# Patient Record
Sex: MALE | Race: WHITE | NOT HISPANIC OR LATINO | Employment: OTHER | URBAN - METROPOLITAN AREA
[De-identification: names, ages, dates, MRNs, and addresses within clinical notes are randomized per-mention and may not be internally consistent; named-entity substitution may affect disease eponyms.]

---

## 2017-02-01 ENCOUNTER — OFFICE VISIT - HEALTHEAST (OUTPATIENT)
Dept: FAMILY MEDICINE | Facility: CLINIC | Age: 62
End: 2017-02-01

## 2017-02-01 DIAGNOSIS — I10 ESSENTIAL HYPERTENSION WITH GOAL BLOOD PRESSURE LESS THAN 130/80: ICD-10-CM

## 2017-02-01 DIAGNOSIS — R05.9 COUGH: ICD-10-CM

## 2017-02-01 DIAGNOSIS — J01.10 ACUTE NON-RECURRENT FRONTAL SINUSITIS: ICD-10-CM

## 2017-03-14 ENCOUNTER — OFFICE VISIT - HEALTHEAST (OUTPATIENT)
Dept: FAMILY MEDICINE | Facility: CLINIC | Age: 62
End: 2017-03-14

## 2017-03-14 DIAGNOSIS — I10 ESSENTIAL HYPERTENSION WITH GOAL BLOOD PRESSURE LESS THAN 130/80: ICD-10-CM

## 2017-03-14 DIAGNOSIS — L60.0 INGROWING NAIL, RIGHT GREAT TOE: ICD-10-CM

## 2017-03-14 DIAGNOSIS — R05.9 COUGH: ICD-10-CM

## 2017-03-14 DIAGNOSIS — B35.1 ONYCHOMYCOSIS: ICD-10-CM

## 2017-05-03 ENCOUNTER — COMMUNICATION - HEALTHEAST (OUTPATIENT)
Dept: FAMILY MEDICINE | Facility: CLINIC | Age: 62
End: 2017-05-03

## 2017-05-03 DIAGNOSIS — I10 ESSENTIAL HYPERTENSION: ICD-10-CM

## 2017-06-06 ENCOUNTER — OFFICE VISIT - HEALTHEAST (OUTPATIENT)
Dept: FAMILY MEDICINE | Facility: CLINIC | Age: 62
End: 2017-06-06

## 2017-06-06 DIAGNOSIS — I10 ESSENTIAL HYPERTENSION: ICD-10-CM

## 2017-06-06 DIAGNOSIS — Z00.00 PREVENTATIVE HEALTH CARE: ICD-10-CM

## 2017-06-06 DIAGNOSIS — K57.30 DIVERTICULOSIS OF LARGE INTESTINE WITHOUT HEMORRHAGE: ICD-10-CM

## 2017-06-06 DIAGNOSIS — E66.09 NON MORBID OBESITY DUE TO EXCESS CALORIES: ICD-10-CM

## 2017-06-06 LAB
CHOLEST SERPL-MCNC: 144 MG/DL
FASTING STATUS PATIENT QL REPORTED: NO
HBA1C MFR BLD: 5.7 % (ref 3.5–6)
HDLC SERPL-MCNC: 42 MG/DL

## 2018-05-23 ENCOUNTER — RECORDS - HEALTHEAST (OUTPATIENT)
Dept: GENERAL RADIOLOGY | Facility: CLINIC | Age: 63
End: 2018-05-23

## 2018-05-23 ENCOUNTER — OFFICE VISIT - HEALTHEAST (OUTPATIENT)
Dept: FAMILY MEDICINE | Facility: CLINIC | Age: 63
End: 2018-05-23

## 2018-05-23 DIAGNOSIS — M25.512 PAIN IN JOINT OF LEFT SHOULDER: ICD-10-CM

## 2018-05-23 DIAGNOSIS — M25.512 PAIN IN LEFT SHOULDER: ICD-10-CM

## 2018-05-23 DIAGNOSIS — I10 ESSENTIAL HYPERTENSION: ICD-10-CM

## 2018-05-23 ASSESSMENT — MIFFLIN-ST. JEOR: SCORE: 1747.41

## 2018-06-20 ENCOUNTER — COMMUNICATION - HEALTHEAST (OUTPATIENT)
Dept: FAMILY MEDICINE | Facility: CLINIC | Age: 63
End: 2018-06-20

## 2018-06-25 ENCOUNTER — OFFICE VISIT - HEALTHEAST (OUTPATIENT)
Dept: FAMILY MEDICINE | Facility: CLINIC | Age: 63
End: 2018-06-25

## 2018-06-25 DIAGNOSIS — J18.9 PNEUMONIA: ICD-10-CM

## 2018-06-25 ASSESSMENT — MIFFLIN-ST. JEOR: SCORE: 1740.15

## 2018-07-05 ENCOUNTER — OFFICE VISIT - HEALTHEAST (OUTPATIENT)
Dept: FAMILY MEDICINE | Facility: CLINIC | Age: 63
End: 2018-07-05

## 2018-07-05 DIAGNOSIS — J32.9 SINUSITIS: ICD-10-CM

## 2018-07-05 ASSESSMENT — MIFFLIN-ST. JEOR: SCORE: 1727.91

## 2019-05-17 ENCOUNTER — COMMUNICATION - HEALTHEAST (OUTPATIENT)
Dept: FAMILY MEDICINE | Facility: CLINIC | Age: 64
End: 2019-05-17

## 2019-05-17 DIAGNOSIS — I10 ESSENTIAL HYPERTENSION: ICD-10-CM

## 2019-07-25 ENCOUNTER — AMBULATORY - HEALTHEAST (OUTPATIENT)
Dept: FAMILY MEDICINE | Facility: CLINIC | Age: 64
End: 2019-07-25

## 2019-07-25 ENCOUNTER — AMBULATORY - HEALTHEAST (OUTPATIENT)
Dept: NURSING | Facility: CLINIC | Age: 64
End: 2019-07-25

## 2019-07-25 DIAGNOSIS — Z23 NEED FOR VACCINATION: ICD-10-CM

## 2019-08-08 ENCOUNTER — RECORDS - HEALTHEAST (OUTPATIENT)
Dept: ADMINISTRATIVE | Facility: OTHER | Age: 64
End: 2019-08-08

## 2019-08-16 ENCOUNTER — COMMUNICATION - HEALTHEAST (OUTPATIENT)
Dept: FAMILY MEDICINE | Facility: CLINIC | Age: 64
End: 2019-08-16

## 2019-08-16 DIAGNOSIS — I10 ESSENTIAL HYPERTENSION: ICD-10-CM

## 2019-08-20 ENCOUNTER — OFFICE VISIT - HEALTHEAST (OUTPATIENT)
Dept: FAMILY MEDICINE | Facility: CLINIC | Age: 64
End: 2019-08-20

## 2019-08-20 DIAGNOSIS — I11.9 HYPERTENSIVE HEART DISEASE WITHOUT HEART FAILURE: ICD-10-CM

## 2019-08-20 LAB
ANION GAP SERPL CALCULATED.3IONS-SCNC: 7 MMOL/L (ref 5–18)
BUN SERPL-MCNC: 21 MG/DL (ref 8–22)
CALCIUM SERPL-MCNC: 10.2 MG/DL (ref 8.5–10.5)
CHLORIDE BLD-SCNC: 107 MMOL/L (ref 98–107)
CO2 SERPL-SCNC: 25 MMOL/L (ref 22–31)
CREAT SERPL-MCNC: 0.97 MG/DL (ref 0.7–1.3)
GFR SERPL CREATININE-BSD FRML MDRD: >60 ML/MIN/1.73M2
GLUCOSE BLD-MCNC: 91 MG/DL (ref 70–125)
POTASSIUM BLD-SCNC: 4 MMOL/L (ref 3.5–5)
SODIUM SERPL-SCNC: 139 MMOL/L (ref 136–145)

## 2019-08-20 ASSESSMENT — MIFFLIN-ST. JEOR: SCORE: 1743.78

## 2019-11-05 ENCOUNTER — COMMUNICATION - HEALTHEAST (OUTPATIENT)
Dept: SCHEDULING | Facility: CLINIC | Age: 64
End: 2019-11-05

## 2019-11-05 DIAGNOSIS — I10 ESSENTIAL HYPERTENSION: ICD-10-CM

## 2020-02-13 ENCOUNTER — OFFICE VISIT - HEALTHEAST (OUTPATIENT)
Dept: FAMILY MEDICINE | Facility: CLINIC | Age: 65
End: 2020-02-13

## 2020-02-13 ENCOUNTER — COMMUNICATION - HEALTHEAST (OUTPATIENT)
Dept: SCHEDULING | Facility: CLINIC | Age: 65
End: 2020-02-13

## 2020-02-13 DIAGNOSIS — Z20.828 EXPOSURE TO INFLUENZA: ICD-10-CM

## 2020-02-13 DIAGNOSIS — R09.81 SINUS CONGESTION: ICD-10-CM

## 2020-02-13 DIAGNOSIS — R05.9 COUGH: ICD-10-CM

## 2020-02-13 LAB
FLUAV AG SPEC QL IA: NORMAL
FLUBV AG SPEC QL IA: NORMAL

## 2020-02-21 ENCOUNTER — COMMUNICATION - HEALTHEAST (OUTPATIENT)
Dept: SCHEDULING | Facility: CLINIC | Age: 65
End: 2020-02-21

## 2020-02-21 DIAGNOSIS — I10 ESSENTIAL HYPERTENSION: ICD-10-CM

## 2020-08-19 ENCOUNTER — COMMUNICATION - HEALTHEAST (OUTPATIENT)
Dept: SCHEDULING | Facility: CLINIC | Age: 65
End: 2020-08-19

## 2020-08-19 DIAGNOSIS — I10 ESSENTIAL HYPERTENSION: ICD-10-CM

## 2020-12-03 ENCOUNTER — COMMUNICATION - HEALTHEAST (OUTPATIENT)
Dept: FAMILY MEDICINE | Facility: CLINIC | Age: 65
End: 2020-12-03

## 2020-12-03 DIAGNOSIS — I10 ESSENTIAL HYPERTENSION: ICD-10-CM

## 2021-01-13 ENCOUNTER — OFFICE VISIT - HEALTHEAST (OUTPATIENT)
Dept: FAMILY MEDICINE | Facility: CLINIC | Age: 66
End: 2021-01-13

## 2021-01-13 DIAGNOSIS — Z00.01 ENCOUNTER FOR GENERAL ADULT MEDICAL EXAMINATION WITH ABNORMAL FINDINGS: ICD-10-CM

## 2021-01-13 DIAGNOSIS — E66.811 CLASS 1 OBESITY DUE TO EXCESS CALORIES WITH SERIOUS COMORBIDITY AND BODY MASS INDEX (BMI) OF 32.0 TO 32.9 IN ADULT: ICD-10-CM

## 2021-01-13 DIAGNOSIS — E66.09 CLASS 1 OBESITY DUE TO EXCESS CALORIES WITH SERIOUS COMORBIDITY AND BODY MASS INDEX (BMI) OF 32.0 TO 32.9 IN ADULT: ICD-10-CM

## 2021-01-13 DIAGNOSIS — M50.30 DEGENERATIVE DISC DISEASE, CERVICAL: ICD-10-CM

## 2021-01-13 DIAGNOSIS — I10 ESSENTIAL HYPERTENSION, BENIGN: ICD-10-CM

## 2021-01-13 DIAGNOSIS — K57.30 DIVERTICULOSIS OF LARGE INTESTINE WITHOUT HEMORRHAGE: ICD-10-CM

## 2021-01-13 DIAGNOSIS — R73.01 IMPAIRED FASTING GLUCOSE: ICD-10-CM

## 2021-01-13 DIAGNOSIS — Z23 ENCOUNTER FOR IMMUNIZATION: ICD-10-CM

## 2021-01-13 DIAGNOSIS — Z12.5 SCREENING FOR PROSTATE CANCER: ICD-10-CM

## 2021-01-13 DIAGNOSIS — D12.6 TUBULAR ADENOMA OF COLON: ICD-10-CM

## 2021-01-13 LAB
ANION GAP SERPL CALCULATED.3IONS-SCNC: 11 MMOL/L (ref 5–18)
BUN SERPL-MCNC: 18 MG/DL (ref 8–22)
CALCIUM SERPL-MCNC: 10.1 MG/DL (ref 8.5–10.5)
CHLORIDE BLD-SCNC: 105 MMOL/L (ref 98–107)
CHOLEST SERPL-MCNC: 159 MG/DL
CO2 SERPL-SCNC: 26 MMOL/L (ref 22–31)
CREAT SERPL-MCNC: 1.07 MG/DL (ref 0.7–1.3)
FASTING STATUS PATIENT QL REPORTED: NO
GFR SERPL CREATININE-BSD FRML MDRD: >60 ML/MIN/1.73M2
GLUCOSE BLD-MCNC: 87 MG/DL (ref 70–125)
HBA1C MFR BLD: 5.4 %
HDLC SERPL-MCNC: 43 MG/DL
LDLC SERPL CALC-MCNC: 98 MG/DL
POTASSIUM BLD-SCNC: 4.2 MMOL/L (ref 3.5–5)
PSA SERPL-MCNC: 0.7 NG/ML (ref 0–4.5)
SODIUM SERPL-SCNC: 142 MMOL/L (ref 136–145)
TRIGL SERPL-MCNC: 88 MG/DL

## 2021-01-13 RX ORDER — LISINOPRIL/HYDROCHLOROTHIAZIDE 10-12.5 MG
1 TABLET ORAL DAILY
Qty: 90 TABLET | Refills: 3 | Status: SHIPPED | OUTPATIENT
Start: 2021-01-13 | End: 2021-08-05

## 2021-01-13 ASSESSMENT — MIFFLIN-ST. JEOR: SCORE: 1762.72

## 2021-01-18 ENCOUNTER — COMMUNICATION - HEALTHEAST (OUTPATIENT)
Dept: PHYSICAL MEDICINE AND REHAB | Facility: CLINIC | Age: 66
End: 2021-01-18

## 2021-01-18 ENCOUNTER — COMMUNICATION - HEALTHEAST (OUTPATIENT)
Dept: LAB | Facility: CLINIC | Age: 66
End: 2021-01-18

## 2021-01-19 ENCOUNTER — HOSPITAL ENCOUNTER (OUTPATIENT)
Dept: PHYSICAL MEDICINE AND REHAB | Facility: CLINIC | Age: 66
Discharge: HOME | End: 2021-01-19
Attending: FAMILY MEDICINE

## 2021-01-19 DIAGNOSIS — R20.2 PARESTHESIA: ICD-10-CM

## 2021-01-19 DIAGNOSIS — M47.812 CERVICAL SPONDYLOSIS WITHOUT MYELOPATHY: ICD-10-CM

## 2021-01-19 DIAGNOSIS — M54.2 CERVICAL SPINE PAIN: ICD-10-CM

## 2021-01-19 ASSESSMENT — MIFFLIN-ST. JEOR: SCORE: 1762.72

## 2021-01-27 ENCOUNTER — OFFICE VISIT - HEALTHEAST (OUTPATIENT)
Dept: PHYSICAL THERAPY | Facility: REHABILITATION | Age: 66
End: 2021-01-27

## 2021-01-27 DIAGNOSIS — M62.81 GENERALIZED MUSCLE WEAKNESS: ICD-10-CM

## 2021-01-27 DIAGNOSIS — R29.3 POOR POSTURE: ICD-10-CM

## 2021-01-27 DIAGNOSIS — M54.2 CERVICALGIA: ICD-10-CM

## 2021-01-29 ENCOUNTER — HOSPITAL ENCOUNTER (OUTPATIENT)
Dept: MRI IMAGING | Facility: CLINIC | Age: 66
Discharge: HOME | End: 2021-01-29
Attending: PHYSICIAN ASSISTANT

## 2021-01-29 ENCOUNTER — COMMUNICATION - HEALTHEAST (OUTPATIENT)
Dept: PHYSICAL MEDICINE AND REHAB | Facility: CLINIC | Age: 66
End: 2021-01-29

## 2021-01-29 DIAGNOSIS — M48.02 SPINAL STENOSIS IN CERVICAL REGION: ICD-10-CM

## 2021-01-29 DIAGNOSIS — M54.2 CERVICAL SPINE PAIN: ICD-10-CM

## 2021-02-05 ENCOUNTER — AMBULATORY - HEALTHEAST (OUTPATIENT)
Dept: NEUROSURGERY | Facility: CLINIC | Age: 66
End: 2021-02-05

## 2021-02-05 ENCOUNTER — OFFICE VISIT - HEALTHEAST (OUTPATIENT)
Dept: PHYSICAL THERAPY | Facility: REHABILITATION | Age: 66
End: 2021-02-05

## 2021-02-05 DIAGNOSIS — M54.2 NECK PAIN: ICD-10-CM

## 2021-02-05 DIAGNOSIS — R29.3 POOR POSTURE: ICD-10-CM

## 2021-02-05 DIAGNOSIS — M54.2 CERVICALGIA: ICD-10-CM

## 2021-02-05 DIAGNOSIS — M62.81 GENERALIZED MUSCLE WEAKNESS: ICD-10-CM

## 2021-02-09 ENCOUNTER — HOSPITAL ENCOUNTER (OUTPATIENT)
Dept: RADIOLOGY | Facility: HOSPITAL | Age: 66
Discharge: HOME | End: 2021-02-09
Attending: NEUROLOGICAL SURGERY

## 2021-02-09 ENCOUNTER — OFFICE VISIT - HEALTHEAST (OUTPATIENT)
Dept: NEUROSURGERY | Facility: CLINIC | Age: 66
End: 2021-02-09

## 2021-02-09 DIAGNOSIS — M54.2 NECK PAIN: ICD-10-CM

## 2021-02-09 DIAGNOSIS — M43.12 SPONDYLOLISTHESIS OF CERVICAL REGION: ICD-10-CM

## 2021-02-09 DIAGNOSIS — M47.22 OSTEOARTHRITIS OF SPINE WITH RADICULOPATHY, CERVICAL REGION: ICD-10-CM

## 2021-02-09 DIAGNOSIS — M48.02 CERVICAL SPINAL STENOSIS: ICD-10-CM

## 2021-02-09 ASSESSMENT — MIFFLIN-ST. JEOR: SCORE: 1786.76

## 2021-02-12 ENCOUNTER — OFFICE VISIT - HEALTHEAST (OUTPATIENT)
Dept: PHYSICAL THERAPY | Facility: REHABILITATION | Age: 66
End: 2021-02-12

## 2021-02-12 DIAGNOSIS — M62.81 GENERALIZED MUSCLE WEAKNESS: ICD-10-CM

## 2021-02-12 DIAGNOSIS — R29.3 POOR POSTURE: ICD-10-CM

## 2021-02-12 DIAGNOSIS — M54.2 CERVICALGIA: ICD-10-CM

## 2021-02-23 ENCOUNTER — OFFICE VISIT - HEALTHEAST (OUTPATIENT)
Dept: PHYSICAL THERAPY | Facility: REHABILITATION | Age: 66
End: 2021-02-23

## 2021-02-23 DIAGNOSIS — M54.2 CERVICALGIA: ICD-10-CM

## 2021-02-23 DIAGNOSIS — M62.81 GENERALIZED MUSCLE WEAKNESS: ICD-10-CM

## 2021-02-23 DIAGNOSIS — R29.3 POOR POSTURE: ICD-10-CM

## 2021-02-26 ENCOUNTER — OFFICE VISIT - HEALTHEAST (OUTPATIENT)
Dept: NEUROSURGERY | Facility: CLINIC | Age: 66
End: 2021-02-26

## 2021-02-26 DIAGNOSIS — M43.12 SPONDYLOLISTHESIS OF CERVICAL REGION: ICD-10-CM

## 2021-02-26 DIAGNOSIS — G95.20 CERVICAL SPINAL CORD COMPRESSION (H): ICD-10-CM

## 2021-02-26 DIAGNOSIS — M47.22 OSTEOARTHRITIS OF SPINE WITH RADICULOPATHY, CERVICAL REGION: ICD-10-CM

## 2021-02-26 ASSESSMENT — MIFFLIN-ST. JEOR: SCORE: 1785.4

## 2021-03-02 ENCOUNTER — OFFICE VISIT - HEALTHEAST (OUTPATIENT)
Dept: PHYSICAL THERAPY | Facility: REHABILITATION | Age: 66
End: 2021-03-02

## 2021-03-02 DIAGNOSIS — M54.2 CERVICALGIA: ICD-10-CM

## 2021-03-02 DIAGNOSIS — R29.3 POOR POSTURE: ICD-10-CM

## 2021-03-02 DIAGNOSIS — M62.81 GENERALIZED MUSCLE WEAKNESS: ICD-10-CM

## 2021-03-09 ENCOUNTER — OFFICE VISIT - HEALTHEAST (OUTPATIENT)
Dept: PHYSICAL THERAPY | Facility: REHABILITATION | Age: 66
End: 2021-03-09

## 2021-03-09 DIAGNOSIS — M54.2 CERVICALGIA: ICD-10-CM

## 2021-03-09 DIAGNOSIS — M62.81 GENERALIZED MUSCLE WEAKNESS: ICD-10-CM

## 2021-03-09 DIAGNOSIS — R29.3 POOR POSTURE: ICD-10-CM

## 2021-03-11 ENCOUNTER — AMBULATORY - HEALTHEAST (OUTPATIENT)
Dept: NURSING | Facility: CLINIC | Age: 66
End: 2021-03-11

## 2021-03-23 ENCOUNTER — OFFICE VISIT - HEALTHEAST (OUTPATIENT)
Dept: PHYSICAL THERAPY | Facility: REHABILITATION | Age: 66
End: 2021-03-23

## 2021-03-23 DIAGNOSIS — R29.3 POOR POSTURE: ICD-10-CM

## 2021-03-23 DIAGNOSIS — M62.81 GENERALIZED MUSCLE WEAKNESS: ICD-10-CM

## 2021-03-23 DIAGNOSIS — M54.2 CERVICALGIA: ICD-10-CM

## 2021-04-01 ENCOUNTER — OFFICE VISIT - HEALTHEAST (OUTPATIENT)
Dept: PHYSICAL THERAPY | Facility: REHABILITATION | Age: 66
End: 2021-04-01

## 2021-04-01 ENCOUNTER — AMBULATORY - HEALTHEAST (OUTPATIENT)
Dept: NURSING | Facility: CLINIC | Age: 66
End: 2021-04-01

## 2021-04-01 DIAGNOSIS — R29.3 POOR POSTURE: ICD-10-CM

## 2021-04-01 DIAGNOSIS — M54.2 CERVICALGIA: ICD-10-CM

## 2021-04-01 DIAGNOSIS — M62.81 GENERALIZED MUSCLE WEAKNESS: ICD-10-CM

## 2021-05-27 ENCOUNTER — OFFICE VISIT - HEALTHEAST (OUTPATIENT)
Dept: FAMILY MEDICINE | Facility: CLINIC | Age: 66
End: 2021-05-27

## 2021-05-27 DIAGNOSIS — B02.9 HERPES ZOSTER WITHOUT COMPLICATION: ICD-10-CM

## 2021-05-27 DIAGNOSIS — I10 ESSENTIAL HYPERTENSION: ICD-10-CM

## 2021-05-28 NOTE — TELEPHONE ENCOUNTER
RN cannot approve Refill Request    RN can NOT refill this medication PCP messaged that patient is overdue for Labs. Last office visit: 6/6/2017 Teryr Posadas MD Last Physical: Visit date not found Last MTM visit: Visit date not found Last visit same specialty: 7/5/2018 Ngoc Millan CNP.  Next visit within 3 mo: Visit date not found  Next physical within 3 mo: Visit date not found      [facility] Triage/Med Refill 5/17/2019    Requested Prescriptions   Pending Prescriptions Disp Refills     lisinopril-hydrochlorothiazide (PRINZIDE,ZESTORETIC) 10-12.5 mg per tablet 90 tablet 3     Sig: TAKE 1 TABLET BY MOUTH ONCE DAILY       Diuretics/Combination Diuretics Refill Protocol  Failed - 5/17/2019  1:03 PM        Failed - Serum Potassium in past 12 months      No results found for: LN-POTASSIUM          Failed - Serum Sodium in past 12 months      No results found for: LN-SODIUM          Failed - Serum Creatinine in past 12 months      Creatinine   Date Value Ref Range Status   06/06/2017 1.00 0.70 - 1.30 mg/dL Final             Passed - Visit with PCP or prescribing provider visit in past 12 months     Last office visit with prescriber/PCP: 6/6/2017 Terry Posadas MD OR same dept: 7/5/2018 Nogc Millan CNP OR same specialty: 7/5/2018 Ngoc Millan CNP  Last physical: Visit date not found Last MTM visit: Visit date not found   Next visit within 3 mo: Visit date not found  Next physical within 3 mo: Visit date not found  Prescriber OR PCP: Terry Posadas MD  Last diagnosis associated with med order: 1. Essential hypertension  - lisinopril-hydrochlorothiazide (PRINZIDE,ZESTORETIC) 10-12.5 mg per tablet; TAKE 1 TABLET BY MOUTH ONCE DAILY  Dispense: 90 tablet; Refill: 3    If protocol passes may refill for 12 months if within 3 months of last provider visit (or a total of 15 months).             Passed - Blood pressure on file in past 12 months     BP Readings from Last 1 Encounters:    07/05/18 122/82

## 2021-05-30 VITALS — WEIGHT: 215 LBS | BODY MASS INDEX: 30.85 KG/M2

## 2021-05-30 VITALS — BODY MASS INDEX: 29.99 KG/M2 | WEIGHT: 209 LBS

## 2021-05-31 VITALS — BODY MASS INDEX: 30.28 KG/M2 | WEIGHT: 211 LBS

## 2021-05-31 NOTE — TELEPHONE ENCOUNTER
RN cannot approve Refill Request    RN can NOT refill this medication Protocol failed and NO refill given.       Prachi Stapleton, [facility] Triage/Med Refill 8/16/2019    Requested Prescriptions   Pending Prescriptions Disp Refills     lisinopril-hydrochlorothiazide (PRINZIDE,ZESTORETIC) 10-12.5 mg per tablet [Pharmacy Med Name: Lisinopril-hydroCHLOROthiazide Oral Tablet 10-12.5 MG] 90 tablet 0     Sig: TAKE 1 TABLET BY MOUTH ONCE DAILY       Diuretics/Combination Diuretics Refill Protocol  Failed - 8/16/2019  7:01 AM        Failed - Visit with PCP or prescribing provider visit in past 12 months     Last office visit with prescriber/PCP: 6/6/2017 Terry Posadas MD OR same dept: Visit date not found OR same specialty: 7/5/2018 Ngoc Millan, SANJANA  Last physical: Visit date not found Last MTM visit: Visit date not found   Next visit within 3 mo: Visit date not found  Next physical within 3 mo: Visit date not found  Prescriber OR PCP: Terry Posadas MD  Last diagnosis associated with med order: 1. Essential hypertension  - lisinopril-hydrochlorothiazide (PRINZIDE,ZESTORETIC) 10-12.5 mg per tablet [Pharmacy Med Name: Lisinopril-hydroCHLOROthiazide Oral Tablet 10-12.5 MG]; TAKE 1 TABLET BY MOUTH ONCE DAILY  Dispense: 90 tablet; Refill: 0    If protocol passes may refill for 12 months if within 3 months of last provider visit (or a total of 15 months).             Failed - Serum Potassium in past 12 months      No results found for: LN-POTASSIUM          Failed - Serum Sodium in past 12 months      No results found for: LN-SODIUM          Failed - Blood pressure on file in past 12 months     BP Readings from Last 1 Encounters:   07/05/18 122/82             Failed - Serum Creatinine in past 12 months      Creatinine   Date Value Ref Range Status   06/06/2017 1.00 0.70 - 1.30 mg/dL Final

## 2021-05-31 NOTE — TELEPHONE ENCOUNTER
Left message to call back for: Patient.  Information to relay to patient:  Patient needs an office visit prior to future refills. Please help him schedule with Dr. Posadas or Ngoc Millan CNP.

## 2021-05-31 NOTE — PROGRESS NOTES
Assessment/Plan:    1. Hypertensive heart disease without heart failure  Blood pressure remains well controlled on current regimen.  Continue with lisinopril-hydrochlorothiazide 10-12.5 mg daily.  Reviewed importance of lifestyle modifications regards to diet and exercise.  Will check kidney function today to ensure stability.  Return to clinic in 3 months for annual exam.   - Basic Metabolic Panel  -Lisinopril-hydrochlorothiazide 10-12.5 mg by mouth once daily.    2.  Unspecified viral upper respiratory tract infection  Symptoms improving over the last week.  Patient prefers to continue monitoring.  If he develops any chest pain, worsening cough or shortness of breath or any systemic symptoms, will return to clinic for further evaluation.    Subjective:    Otis Brumfield is a 64 year old male seen today for medication management.  He has hypertension, currently taking lisinopril-hydrochlorothiazide 10-12.5 mg once daily.  Tolerating medication well.  Denies any side effects.  Does not take blood pressure at home but blood pressure in clinic today is well controlled.  He denies any chest pain, heart palpitations, shortness of breath or lower extremity edema.  Reports feeling well overall.  States that he has his routine in regards to diet and exercise.  Work on activity and day-to-day.  Is contemplating retiring next year.  He feels well today otherwise.  He has been fighting a respiratory tract infection for the last several weeks.  He thought about coming in a couple of weeks ago to get a chest x-ray but then started to feel better.  He continues to have a lingering cough but he feels that this is improving.  He prefers not to have a chest x-ray or further work-up in this regard today.  No additional concerns. Review of systems is as stated in HPI, and the remainder of the 10 system review is otherwise unremarkable.    Past Medical History, Family History, and Social History reviewed.    Past Surgical History:  "  Procedure Laterality Date     HERNIA REPAIR Left      [place] REMOVAL OF SPERM DUCT(S)      Description: Surgery Of Male Genitalia Vasectomy;  Recorded: 05/24/2007;     [place] REMOVAL OF TONSILS,<11 Y/O      Description: Tonsillectomy;  Recorded: 05/24/2007;        Family History   Problem Relation Age of Onset     Colon cancer Father      Diabetes Mother      Diabetes Brother         Past Medical History:   Diagnosis Date     Hypertension         Social History     Tobacco Use     Smoking status: Current Some Day Smoker     Types: Cigars   Substance Use Topics     Alcohol use: Yes     Alcohol/week: 1.8 - 2.4 oz     Types: 3 - 4 Cans of beer per week     Drug use: No        Current Outpatient Medications   Medication Sig Dispense Refill     ibuprofen (ADVIL,MOTRIN) 200 MG tablet Take 200-400 mg by mouth every 8 (eight) hours as needed for pain.       lisinopril-hydrochlorothiazide (PRINZIDE,ZESTORETIC) 10-12.5 mg per tablet TAKE 1 TABLET BY MOUTH ONCE DAILY 30 tablet 3     No current facility-administered medications for this visit.           Objective:    Vitals:    08/20/19 1433   BP: 110/60   Patient Site: Left Arm   Patient Position: Sitting   Cuff Size: Adult Regular   Weight: 212 lb 3.2 oz (96.3 kg)   Height: 5' 10\" (1.778 m)      Body mass index is 30.45 kg/m .      General Appearance:  Alert, cooperative, no distress, appears stated age   HEENT:  Normal.  No acute findings.   Neck: Supple, symmetrical, no adenopathy.   Lungs:   Clear to auscultation bilaterally, respirations unlabored.  No expiratory wheeze or inspiratory crackles noted.   Heart:  Regular rate and rhythm, S1, S2 normal, no murmur, rub or gallop   Extremities: Extremities normal.  No cyanosis or edema   Skin: Warm, dry. No rashes or lesions         This note has been dictated using voice recognition software. Any grammatical or context distortions are unintentional and inherent to the use of this software.     "

## 2021-06-01 ENCOUNTER — COMMUNICATION - HEALTHEAST (OUTPATIENT)
Dept: FAMILY MEDICINE | Facility: CLINIC | Age: 66
End: 2021-06-01

## 2021-06-01 ENCOUNTER — AMBULATORY - HEALTHEAST (OUTPATIENT)
Dept: FAMILY MEDICINE | Facility: CLINIC | Age: 66
End: 2021-06-01

## 2021-06-01 VITALS — HEIGHT: 70 IN | BODY MASS INDEX: 30.26 KG/M2 | WEIGHT: 211.4 LBS

## 2021-06-01 VITALS — BODY MASS INDEX: 30.49 KG/M2 | WEIGHT: 213 LBS | HEIGHT: 70 IN

## 2021-06-01 VITALS — HEIGHT: 70 IN | BODY MASS INDEX: 29.88 KG/M2 | WEIGHT: 208.7 LBS

## 2021-06-01 DIAGNOSIS — B02.9 HERPES ZOSTER WITHOUT COMPLICATION: ICD-10-CM

## 2021-06-01 RX ORDER — TRIAMCINOLONE ACETONIDE 1 MG/G
CREAM TOPICAL
Qty: 45 G | Refills: 1 | Status: SHIPPED | OUTPATIENT
Start: 2021-06-01 | End: 2021-08-05

## 2021-06-01 RX ORDER — TRAMADOL HYDROCHLORIDE 50 MG/1
50 TABLET ORAL EVERY 6 HOURS PRN
Qty: 15 TABLET | Refills: 0 | Status: SHIPPED | OUTPATIENT
Start: 2021-06-01 | End: 2021-08-05

## 2021-06-02 ENCOUNTER — RECORDS - HEALTHEAST (OUTPATIENT)
Dept: ADMINISTRATIVE | Facility: CLINIC | Age: 66
End: 2021-06-02

## 2021-06-03 VITALS — BODY MASS INDEX: 30.38 KG/M2 | WEIGHT: 212.2 LBS | HEIGHT: 70 IN

## 2021-06-03 NOTE — TELEPHONE ENCOUNTER
Medication Request  Medication name:   lisinopril-hydrochlorothiazide (PRINZIDE,ZESTORETIC) 10-12.5 mg per tablet 30 tablet 3 8/16/2019     Sig: TAKE 1 TABLET BY MOUTH ONCE DAILY    Sent to pharmacy as: lisinopril-hydrochlorothiazide (PRINZIDE,ZESTORETIC) 10-12.5 mg per tablet    Notes to Pharmacy: Patient needs office visit before further refills.    E-Prescribing Status: Receipt confirmed by pharmacy (8/16/2019  7:46 AM CDT)        Pharmacy Name and Location: [facility] # [id number]  Reason for request: Patient would like a 90 day supply  When did you use medication last?:  Unknown.  Patient offered appointment:  patient declined and NA  Okay to leave a detailed message: yes

## 2021-06-04 VITALS
BODY MASS INDEX: 31.18 KG/M2 | WEIGHT: 217.3 LBS | SYSTOLIC BLOOD PRESSURE: 130 MMHG | DIASTOLIC BLOOD PRESSURE: 80 MMHG | TEMPERATURE: 98.9 F

## 2021-06-05 VITALS — BODY MASS INDEX: 32.44 KG/M2 | WEIGHT: 219 LBS | HEIGHT: 69 IN

## 2021-06-05 VITALS
BODY MASS INDEX: 33.22 KG/M2 | WEIGHT: 224.3 LBS | OXYGEN SATURATION: 98 % | HEART RATE: 70 BPM | DIASTOLIC BLOOD PRESSURE: 89 MMHG | SYSTOLIC BLOOD PRESSURE: 161 MMHG | HEIGHT: 69 IN

## 2021-06-05 VITALS
SYSTOLIC BLOOD PRESSURE: 130 MMHG | HEIGHT: 69 IN | DIASTOLIC BLOOD PRESSURE: 80 MMHG | WEIGHT: 219 LBS | TEMPERATURE: 98.4 F | BODY MASS INDEX: 32.44 KG/M2 | HEART RATE: 92 BPM | OXYGEN SATURATION: 98 %

## 2021-06-05 VITALS
OXYGEN SATURATION: 98 % | BODY MASS INDEX: 33.18 KG/M2 | SYSTOLIC BLOOD PRESSURE: 148 MMHG | HEART RATE: 62 BPM | RESPIRATION RATE: 16 BRPM | DIASTOLIC BLOOD PRESSURE: 92 MMHG | WEIGHT: 224 LBS | HEIGHT: 69 IN

## 2021-06-06 NOTE — TELEPHONE ENCOUNTER
Refill Approved    Rx renewed per Medication Renewal Policy. Medication was last renewed on 11/5/19.    Candace Collazo, [facility] Triage/Med Refill 2/24/2020     Requested Prescriptions   Pending Prescriptions Disp Refills     lisinopriL-hydrochlorothiazide (PRINZIDE,ZESTORETIC) 10-12.5 mg per tablet [Pharmacy Med Name: Lisinopril-hydroCHLOROthiazide Oral Tablet 10-12.5 MG] 90 tablet 0     Sig: TAKE 1 TABLET BY MOUTH ONCE DAILY       Diuretics/Combination Diuretics Refill Protocol  Passed - 2/21/2020  7:01 AM        Passed - Visit with PCP or prescribing provider visit in past 12 months     Last office visit with prescriber/PCP: 6/6/2017 Terry Posadas MD OR same dept: Visit date not found OR same specialty: Visit date not found  Last physical: Visit date not found Last MTM visit: Visit date not found   Next visit within 3 mo: Visit date not found  Next physical within 3 mo: Visit date not found  Prescriber OR PCP: Terry Posadas MD  Last diagnosis associated with med order: 1. Essential hypertension  - lisinopriL-hydrochlorothiazide (PRINZIDE,ZESTORETIC) 10-12.5 mg per tablet [Pharmacy Med Name: Lisinopril-hydroCHLOROthiazide Oral Tablet 10-12.5 MG]; TAKE 1 TABLET BY MOUTH ONCE DAILY  Dispense: 90 tablet; Refill: 0    If protocol passes may refill for 12 months if within 3 months of last provider visit (or a total of 15 months).             Passed - Serum Potassium in past 12 months      Lab Results   Component Value Date    Potassium 4.0 08/20/2019             Passed - Serum Sodium in past 12 months      Lab Results   Component Value Date    Sodium 139 08/20/2019             Passed - Blood pressure on file in past 12 months     BP Readings from Last 1 Encounters:   02/13/20 130/80             Passed - Serum Creatinine in past 12 months      Creatinine   Date Value Ref Range Status   08/20/2019 0.97 0.70 - 1.30 mg/dL Final

## 2021-06-06 NOTE — PROGRESS NOTES
Assessment and Plan:     1. Cough  Influenza A/B Rapid Test- Nasal Swab   2. Sinus congestion     3. Exposure to influenza  oseltamivir (TAMIFLU) 75 MG capsule     Due to exposure to influenza, will treat with Tamiflu.  Educated on indications and side effects.  Discussed symptomatic treatment with over-the-counter nasal saline sprays to assist with sinus congestion.  Discussed potential complications of influenza including sinusitis and pneumonia.  If symptoms persist or worsen, suggest follow-up with Dr. Posadas.  He is content with the plan.    Subjective:     Otis is a 64 y.o. male presenting to the clinic for concerns for influenza exposure.  Patient's daughter, son-in-law, and granddaughter were recently diagnosed with influenza.  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   He developed a cough yesterday.  He describes the cough as nonproductive.  He has had sinus congestion, facial pressure, headache, postnasal drainage.  He denies sore throat, fever, shortness of breath, chest tightness, wheezing, nausea, vomiting, stomachache.  He has been taking over-the-counter Mucinex and DayQuil.  Patient's wife called into the clinic today and received prophylactic Tamiflu for herself.    Review of Systems: A complete 14 point review of systems was obtained and is negative or as stated in the history of present illness.    Social History     Socioeconomic History     Marital status:      Spouse name: Not on file     Number of children: Not on file     Years of education: Not on file     Highest education level: Not on file   Occupational History     Not on file   Social Needs     Financial resource strain: Not on file     Food insecurity:     Worry: Not on file     Inability: Not on file     Transportation needs:     Medical: Not on file     Non-medical: Not on file   Tobacco Use     Smoking status: Current Some Day Smoker     Types: Cigars      Smokeless tobacco: Never Used   Substance and Sexual Activity     Alcohol use: Yes     Alcohol/week: 3.0 - 4.0 standard drinks     Types: 3 - 4 Cans of beer per week     Drug use: No     Sexual activity: Not on file   Lifestyle     Physical activity:     Days per week: Not on file     Minutes per session: Not on file     Stress: Not on file   Relationships     Social connections:     Talks on phone: Not on file     Gets together: Not on file     Attends Judaism service: Not on file     Active member of club or organization: Not on file     Attends meetings of clubs or organizations: Not on file     Relationship status: Not on file     Intimate partner violence:     Fear of current or ex partner: Not on file     Emotionally abused: Not on file     Physically abused: Not on file     Forced sexual activity: Not on file   Other Topics Concern     Not on file   Social History Narrative     Not on file       Active Ambulatory Problems     Diagnosis Date Noted     Benign Polyps Of The Large Intestine      Diverticulosis      Hypertension      Acrochordon      Acute Meniscal Tear      Joint Pain, Localized In The Right Shoulder      Pneumonia 06/25/2018     Hypertensive heart disease without heart failure 08/20/2019     Resolved Ambulatory Problems     Diagnosis Date Noted     Abdominal Pain In The Left Lower Belly (LLQ)      Normochromic, Normocytic Anemia      Cerumen Impaction      Hematuria      Otitis Externa      Nonsuppurative Otitis Media      Eustachian Tube Dysfunction Of The Right Ear      Plantar Fasciitis      Joint Pain, Localized In The Hip      Acute Suppurative Otitis Media Of The Right Ear      Joint Pain, Localized In The Knee      Past Medical History:   Diagnosis Date     Hypertension        Family History   Problem Relation Age of Onset     Colon cancer Father      Diabetes Mother      Diabetes Brother        Objective:     /80 (Patient Site: Right Arm, Cuff Size: Adult Large)   Temp 98.9  F  (37.2  C) (Tympanic)   Wt 217 lb 4.8 oz (98.6 kg)   BMI 31.18 kg/m      Patient is alert, in no obvious distress.   Skin: Warm, dry.  No lesions or rashes.  Skin turgor rapid return.   HEENT:  Head normocephalic, atraumatic.  Eyes normal.  Ears normal.  Nose patent, mucosa red.  Oropharynx slightly erythematous.  No lesions or tonsillar enlargement.   Neck: Supple, no lymphadenopathy.   Lungs:  Clear to auscultation. Respirations even and unlabored.  No wheezing or rales noted.   Heart:  Regular rate and rhythm.  No murmurs.       LABORATORY: Influenza ordered and is negative.

## 2021-06-06 NOTE — TELEPHONE ENCOUNTER
Pt reports definite exposure to influenza.  Daughter, son-in-law, and their baby all diagnosed with influenza.    Pt himself reports symptoms x 24 hours:  - light cough (tickly type cough)  No other symptoms whatsoever.  Feels generally well.  Still working daily.    Due to family's influenza status, pt would like Tamiflu if appropriate.  Agrees to clinical eval.  Warm transferred to a  for this purpose now.    Yamileth Mejias RN  [facility] Triage     Reason for Disposition    Patient wants to be seen    Protocols used: INFLUENZA - SEASONAL-A-OH

## 2021-06-08 ENCOUNTER — COMMUNICATION - HEALTHEAST (OUTPATIENT)
Dept: SCHEDULING | Facility: CLINIC | Age: 66
End: 2021-06-08

## 2021-06-08 ENCOUNTER — AMBULATORY - HEALTHEAST (OUTPATIENT)
Dept: FAMILY MEDICINE | Facility: CLINIC | Age: 66
End: 2021-06-08

## 2021-06-08 DIAGNOSIS — B02.9 HERPES ZOSTER WITHOUT COMPLICATION: ICD-10-CM

## 2021-06-08 RX ORDER — GABAPENTIN 100 MG/1
100 CAPSULE ORAL 3 TIMES DAILY
Qty: 90 CAPSULE | Refills: 0 | Status: SHIPPED | OUTPATIENT
Start: 2021-06-08 | End: 2021-08-05

## 2021-06-08 NOTE — PROGRESS NOTES
Subjective:    Otis Brumfield is seen today for ongoing illness.  Onset over past week.  Sore throat.  Head congestion.  Postnasal drainage.  Mild headache.  Cough began yesterday with postnasal drainage.  Sore throat worse in the morning.  Using NyQuil OTC.  Will be traveling to [place] in the next week or so and wants to be proactive.  No shortness of breath.  No fevers or chills.  Appetite somewhat decreased.  Did not receive flu shot this season.  Uncertain of exposure to strep, flu, etc.     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   1 older bro - ? DM   Surgeries: inguinal hernia repair ? left side; right rotator cuff surgery 2014 (Dr. Gabriele Abernathy)  Hospitalizations: none    Past Surgical History   Procedure Laterality Date     Pr removal of sperm duct(s)       Description: Surgery Of Male Genitalia Vasectomy;  Recorded: 05/24/2007;     Pr removal of tonsils,<11 y/o       Description: Tonsillectomy;  Recorded: 05/24/2007;     Hernia repair Left         Family History   Problem Relation Age of Onset     Colon cancer Father      Diabetes Mother      Diabetes Brother         Past Medical History   Diagnosis Date     Hypertension         Social History   Substance Use Topics     Smoking status: Current Some Day Smoker     Types: Cigars     Smokeless tobacco: None     Alcohol use 1.8 - 2.4 oz/week     3 - 4 Cans of beer per week        Current Outpatient Prescriptions   Medication Sig Dispense Refill     lisinopril-hydrochlorothiazide (PRINZIDE,ZESTORETIC) 10-12.5 mg per tablet TAKE 1 TABLET BY MOUTH DAILY 90 tablet 3     azithromycin (ZITHROMAX) 250 MG tablet Take 2 tabs on day one, and then 1 tab on days 2-5. 6 tablet 0     ibuprofen (ADVIL,MOTRIN) 200 MG tablet  Take 200-400 mg by mouth every 8 (eight) hours as needed for pain.       No current facility-administered medications for this visit.           Objective:    Vitals:    02/01/17 1456 02/01/17 1526   BP: 130/90 130/84   Pulse: 61    Temp: 97.8  F (36.6  C)    SpO2: 98%    Weight: 209 lb (94.8 kg)       Body mass index is 29.99 kg/(m^2).    Alert.  No apparent distress with blood pressure 130/84 on recheck.  Harsh cough frequently however.  HEENT exam TMs normal.  Nasopharynx with increased congestion with maxillary sinus tenderness.  Oropharynx with scant postnasal drainage with erythema involving uvula and soft palate without tonsillar exudate.  Neck supple without significant cervical lymphadenopathy.  Chest clear to auscultation.  Cardiac exam regular.    Lab Results   Component Value Date    WBC 9.3 02/01/2017    HGB 16.1 02/01/2017    HCT 48.1 02/01/2017    MCV 93 02/01/2017     02/01/2017        Assessment:    1. Cough  HM2(CBC w/o Differential)    Influenza A/B Rapid Test    XR Chest PA and Lateral   2. Acute non-recurrent frontal sinusitis  azithromycin (ZITHROMAX) 250 MG tablet   3. Essential hypertension with goal blood pressure less than 130/80           Plan:    Chest x-ray appears negative.  Rapid influenza negative.  CBC obtained today was normal without leukocytosis.  Discussed early sinusitis concerns with upcoming travel to [place] noted.  Patient likes Z-Speedy as directed and will notify with worsening nonimprovement otherwise OTC cough and cold medication were discussed.  Blood pressure will be monitored and avoid OTC decongestants.  We'll provide tetanus booster at follow-up.

## 2021-06-09 NOTE — PROGRESS NOTES
Subjective:    Otis Brumfield is seen today for concerns with right great toe pain.  Possible trauma however does not recall specific event.  Has noticed nail changes with more curving appearance.  Did have pedicure recently prior to going on vacation to [place].  Certain pair shoes from [facility] seem to help and do not provide much discomfort with ambulation however a second pair that are black in color causes more pressure on the forefoot.  Denies drainage.  Tenderness more lateral aspect of the great toe nail margin.  Underlying hypertension.  Continues lisinopril hydrochlorothiazide 10/12.51 tablet daily.  Tolerating well.  Cough is resolved without concern for ACE inhibitor etiology.  Z-Speedy had been prescribed prior to [place] trip with complete resolution of symptoms within 48 hours including sinus drainage, cough, etc.  Comprehensive review of systems as above otherwise all negative.     - Rhoda   1 daughter - Jo Ann (graduated from 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   1 older bro - ? DM   Surgeries: inguinal hernia repair ? left side; right rotator cuff surgery 2014 (Dr. Gabriele Abernathy)  Hospitalizations: none    Past Surgical History:   Procedure Laterality Date     HERNIA REPAIR Left      [place] REMOVAL OF SPERM DUCT(S)      Description: Surgery Of Male Genitalia Vasectomy;  Recorded: 05/24/2007;     [place] REMOVAL OF TONSILS,<13 Y/O      Description: Tonsillectomy;  Recorded: 05/24/2007;        Family History   Problem Relation Age of Onset     Colon cancer Father      Diabetes Mother      Diabetes Brother         Past Medical History:   Diagnosis Date     Hypertension         Social History   Substance Use Topics     Smoking status: Current Some Day Smoker     Types: Cigars     Smokeless tobacco: None      Alcohol use 1.8 - 2.4 oz/week     3 - 4 Cans of beer per week        Current Outpatient Prescriptions   Medication Sig Dispense Refill     lisinopril-hydrochlorothiazide (PRINZIDE,ZESTORETIC) 10-12.5 mg per tablet TAKE 1 TABLET BY MOUTH DAILY 90 tablet 3     azithromycin (ZITHROMAX) 250 MG tablet Take 2 tabs on day one, and then 1 tab on days 2-5. 6 tablet 0     ibuprofen (ADVIL,MOTRIN) 200 MG tablet Take 200-400 mg by mouth every 8 (eight) hours as needed for pain.       No current facility-administered medications for this visit.           Objective:    Vitals:    03/14/17 1104   BP: 100/60   Pulse: 76   Weight: 215 lb (97.5 kg)      Body mass index is 30.85 kg/(m^2).    Alert.  No apparent distress.  Right foot evaluation showing lateral great toe nail margin erythema and swelling without drainage however.  Tenderness to direct pressure.  Some splaying between second and third toes right greater than left foot however this may be normal anatomy for patient.  Right great toe nail dystrophic changes with curling noted and thickening.  No purulent drainage.      Assessment:    1. Ingrowing nail, right great toe     2. Essential hypertension with goal blood pressure less than 130/80     3. Onychomycosis     4. Cough           Plan:    25 minutes total time with patient, > 50% with counseling and coordination of cares.  Discussed right foot great toe ingrown nail and ways to manage conservatively without need for partial nail avulsion.  Also do not recommend terbinafine treatment at this time due to potential side effects and risk for recurrence.  Patient will continue foot soaks minimum 10 minutes twice daily over next 2 weeks and as needed.  Discussed using protective pad between great toe and second toe to avoid direct pressure of second toe on area of great toe involvement.  Will schedule for partial nail avulsion procedure if persistent or worsening symptoms identified.  Continue current antihypertensive  medication.  Notify if recurrent cough.

## 2021-06-10 NOTE — TELEPHONE ENCOUNTER
Due for labs    Rx renewed per Medication Renewal Policy. Medication was last renewed on 2/24/20.    Prachi Stapleton, [facility] Triage/Med Refill 8/20/2020     Requested Prescriptions   Pending Prescriptions Disp Refills     lisinopriL-hydrochlorothiazide (PRINZIDE,ZESTORETIC) 10-12.5 mg per tablet [Pharmacy Med Name: Lisinopril-hydroCHLOROthiazide Oral Tablet 10-12.5 MG] 90 tablet 0     Sig: TAKE 1 TABLET BY MOUTH ONCE DAILY       Diuretics/Combination Diuretics Refill Protocol  Passed - 8/19/2020  2:00 AM        Passed - Visit with PCP or prescribing provider visit in past 12 months     Last office visit with prescriber/PCP: 6/6/2017 Terry Posadas MD OR same dept: Visit date not found OR same specialty: Visit date not found  Last physical: Visit date not found Last MTM visit: Visit date not found   Next visit within 3 mo: Visit date not found  Next physical within 3 mo: Visit date not found  Prescriber OR PCP: Terry Posadas MD  Last diagnosis associated with med order: 1. Essential hypertension  - lisinopriL-hydrochlorothiazide (PRINZIDE,ZESTORETIC) 10-12.5 mg per tablet [Pharmacy Med Name: Lisinopril-hydroCHLOROthiazide Oral Tablet 10-12.5 MG]; TAKE 1 TABLET BY MOUTH ONCE DAILY  Dispense: 90 tablet; Refill: 0    If protocol passes may refill for 12 months if within 3 months of last provider visit (or a total of 15 months).             Passed - Serum Potassium in past 12 months      No results found for: LN-POTASSIUM          Passed - Serum Sodium in past 12 months      No results found for: LN-SODIUM          Passed - Blood pressure on file in past 12 months     BP Readings from Last 1 Encounters:   02/13/20 130/80             Passed - Serum Creatinine in past 12 months      Creatinine   Date Value Ref Range Status   08/20/2019 0.97 0.70 - 1.30 mg/dL Final

## 2021-06-11 NOTE — PROGRESS NOTES
Assessment:    1. Essential hypertension  lisinopril-hydrochlorothiazide (PRINZIDE,ZESTORETIC) 10-12.5 mg per tablet    Basic Metabolic Panel   2. Diverticulosis of large intestine without hemorrhage     3. Non morbid obesity due to excess calories  Glycosylated Hemoglobin A1c    HDL Cholesterol    Cholesterol, Total   4. Preventative health care  Td, Adult, Adsorbed (blue label)         Plan:    Hypertension currently at goal with blood pressure 112/68 and recheck.  Refill lisinopril hydrochlorothiazide 10/12.51 tablet daily with anticipated fasting physical exam in next 6-12 months.  Will check nonfasting labs today including basic metabolic panel, A1c as well as total and HDL cholesterol levels.  Discussed obesity findings and did suggest weight goal less than 200 pounds initially, less than 190 pounds ideally.  Prior colonoscopy July 25, 2014 with diverticulosis noted told her repeat 5 year interval.  Tetanus booster given today otherwise immunizations up-to-date.  Offered Zostavax at physical exam.        Subjective:    Otis Brumfield is seen today for follow-up evaluation.  Known history of hypertension.  Continues lisinopril hydrochlorothiazide 10/12.5 using 1 tablet daily.  Tolerating well.  No cough.  No dizziness.  BMI just over 30 with obesity discussed.  Attempts at dietary and exercise modifications.  Diverticulosis historically with prior colonoscopy July 25, 2014 told her repeat 5 year interval.  Last tetanus shot over 10 years ago.  No chest pain or shortness of breath.  No palpitations.  No recent illness.  Comprehensive review of systems as above otherwise all negative.     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   1 older bro - ? DM   Surgeries:  inguinal hernia repair ? left side; right rotator cuff surgery 2014 (Dr. Gabriele Abernathy)  Hospitalizations: none    Past Surgical History:   Procedure Laterality Date     HERNIA REPAIR Left      WI REMOVAL OF SPERM DUCT(S)      Description: Surgery Of Male Genitalia Vasectomy;  Recorded: 05/24/2007;     WI REMOVAL OF TONSILS,<13 Y/O      Description: Tonsillectomy;  Recorded: 05/24/2007;        Family History   Problem Relation Age of Onset     Colon cancer Father      Diabetes Mother      Diabetes Brother         Past Medical History:   Diagnosis Date     Hypertension         Social History   Substance Use Topics     Smoking status: Current Some Day Smoker     Types: Cigars     Smokeless tobacco: None     Alcohol use 1.8 - 2.4 oz/week     3 - 4 Cans of beer per week        Current Outpatient Prescriptions   Medication Sig Dispense Refill     lisinopril-hydrochlorothiazide (PRINZIDE,ZESTORETIC) 10-12.5 mg per tablet TAKE 1 TABLET BY MOUTH ONCE DAILY 90 tablet 3     azithromycin (ZITHROMAX) 250 MG tablet Take 2 tabs on day one, and then 1 tab on days 2-5. 6 tablet 0     ibuprofen (ADVIL,MOTRIN) 200 MG tablet Take 200-400 mg by mouth every 8 (eight) hours as needed for pain.       No current facility-administered medications for this visit.           Objective:    Vitals:    06/06/17 1420   BP: 110/70   Pulse: 88   Weight: 211 lb (95.7 kg)      Body mass index is 30.28 kg/(m^2).    Alert.  No apparent distress.  Chest clear.  Cardiac exam regular with blood pressure 112/68.  Extremities warm and dry.

## 2021-06-13 NOTE — TELEPHONE ENCOUNTER
RN cannot approve Refill Request    RN can NOT refill this medication Protocol failed and NO refill given. Last office visit: 6/6/2017 Terry Posadas MD Last Physical: Visit date not found Last MTM visit: Visit date not found Last visit same specialty: 2/13/2020 Annette Everett CNP.  Next visit within 3 mo: Visit date not found  Next physical within 3 mo: Visit date not found      Prcahi Stapleton, [facility] Connection Triage/Med Refill 12/4/2020    Requested Prescriptions   Pending Prescriptions Disp Refills     lisinopriL-hydrochlorothiazide (PRINZIDE,ZESTORETIC) 10-12.5 mg per tablet [Pharmacy Med Name: Lisinopril-hydroCHLOROthiazide Oral Tablet 10-12.5 MG] 90 tablet 0     Sig: TAKE ONE TABLET BY MOUTH ONE TIME DAILY       Diuretics/Combination Diuretics Refill Protocol  Failed - 12/3/2020  6:13 AM        Failed - Serum Potassium in past 12 months      No results found for: LN-POTASSIUM          Failed - Serum Sodium in past 12 months      No results found for: LN-SODIUM          Failed - Serum Creatinine in past 12 months      Creatinine   Date Value Ref Range Status   08/20/2019 0.97 0.70 - 1.30 mg/dL Final             Passed - Visit with PCP or prescribing provider visit in past 12 months     Last office visit with prescriber/PCP: 6/6/2017 Terry Posadas MD OR same dept: 2/13/2020 Annette Everett CNP OR same specialty: 2/13/2020 Annette Everett CNP  Last physical: Visit date not found Last MTM visit: Visit date not found   Next visit within 3 mo: Visit date not found  Next physical within 3 mo: Visit date not found  Prescriber OR PCP: Terry Posadas MD  Last diagnosis associated with med order: 1. Essential hypertension  - lisinopriL-hydrochlorothiazide (PRINZIDE,ZESTORETIC) 10-12.5 mg per tablet [Pharmacy Med Name: Lisinopril-hydroCHLOROthiazide Oral Tablet 10-12.5 MG]; TAKE ONE TABLET BY MOUTH ONE TIME DAILY   Dispense: 90 tablet; Refill: 0    If protocol passes may refill for 12 months if within 3 months of  last provider visit (or a total of 15 months).             Passed - Blood pressure on file in past 12 months     BP Readings from Last 1 Encounters:   02/13/20 130/80

## 2021-06-14 NOTE — PROGRESS NOTES
Assessment and Plan:     Patient has been advised of split billing requirements and indicates understanding: No     1. Encounter for general adult medical examination with abnormal findings  Welcome to Medicare physical completed.  Risks associated with suboptimal diet.  Continue practice of consistent exercise.  Annual wellness visits to continue.    2. Class 1 obesity due to excess calories with serious comorbidity and body mass index (BMI) of 32.0 to 32.9 in adult  Dietary and exercise modification for weight goal less than 210 pounds initially, less than 200 pounds ideally.    3. Essential hypertension, benign  Hypertension at goal continue lisinopril hydrochlorothiazide 10/12.5 daily with refills provided.  Med monitoring completed.  - Lipid Cascade; Future  - Basic Metabolic Panel; Future  - lisinopriL-hydrochlorothiazide (PRINZIDE,ZESTORETIC) 10-12.5 mg per tablet; Take 1 tablet by mouth daily.  Dispense: 90 tablet; Refill: 3    4. Tubular adenoma of colon  Tubular adenoma of colon historically with most recent colonoscopy August 8, 2019 we will repeat at 5-year interval.    5. Diverticulosis of large intestine without hemorrhage  History of diverticulosis without known history of diverticulitis.    6. Encounter for immunization  Pneumovax immunization provided.  Patient declines flu shot.  - Pneumococcal polysaccharide vaccine 23-valent 1 yo or older, subq/IM    7. Degenerative disc disease, cervical  Chronic cervical disc issues with MRI reviewed from April 15, 2011 showing low-grade degenerative retrolisthesis and straightening of normal cervical lordosis.  Small central disc herniation abutting C4 nerve roots bilaterally at C3-C4.  C5-C6 with left C6 nerve root involvement.  C6-C7 with abutment of bilateral C7 nerve roots.  Referred to spine care for further treatment options and consideration for repeat cervical MRI etc.  - Ambulatory referral to [facility]    8. Impaired fasting glucose  Fasting  glucose and A1c obtained.  - Glycosylated Hemoglobin A1c; Future  - Basic Metabolic Panel; Future    10. Screening for prostate cancer  PSA screen completed based on age criteria.  - PSA (Prostatic-Specific Antigen), Annual Screen; Future          The patient's current medical problems were reviewed.    I have had an Advance Directives discussion with the patient.  The following high BMI interventions were performed this visit: encouragement to exercise, weight monitoring, weight loss from baseline weight and lifestyle education regarding diet.  Ensure ongoing efforts to achieve weight goal < 210 pounds initially, < 200 pounds ideally.     The following health maintenance schedule was reviewed with the patient and provided in printed form in the after visit summary:   Health Maintenance   Topic Date Due     HEPATITIS C SCREENING  1955     ZOSTER VACCINES (1 of 2) 04/10/2004     LIPID  04/18/2017     INFLUENZA VACCINE RULE BASED (1) 08/01/2020     MEDICARE ANNUAL WELLNESS VISIT  01/13/2022     FALL RISK ASSESSMENT  01/13/2022     ADVANCE CARE PLANNING  01/13/2026     TD 18+ HE  06/06/2027     COLORECTAL CANCER SCREENING  08/08/2029     Pneumococcal Vaccine: 65+ Years  Completed     Pneumococcal Vaccine: Pediatrics (0 to 5 Years) and At-Risk Patients (6 to 64 Years)  Aged Out     HEPATITIS B VACCINES  Aged Out        Subjective:     Chief Complaint: Otis Brumfield is an 65 y.o. male here for a Welcome to Medicare visit.     HPI: In general doing well.  Recently retired May 2020 and has had some weight gain.  Not as active.  Continues lisinopril hydrochlorothiazide 10/12.51 tablet daily for hypertension.  Diverticulosis.  Prior tubular adenoma on prior colonoscopy August 8, 2019 told to repeat at 5-year interval.  Walks between 3 and 5 miles per day at times however now described plantar fasciitis involving primarily the right first MTP joint plantar aspect.  Neck pain also.  Crackling sensation at times.  Once  "in a while it will \"pop\" providing symptom relief.  No chest pain or shortness of breath.  No nausea.  No blood in stool.     Review of Systems:  Please see above.  The rest of the review of systems are negative for all systems.     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   1 older bro - DM   Surgeries: inguinal hernia repair ? left side; right rotator cuff surgery  (Dr. Gabriele Abernathy)  Hospitalizations: none      Patient Care Team:  Terry Posadas MD as PCP - Annette Melgar CNP as Assigned PCP     Patient Active Problem List   Diagnosis     Benign Polyps Of The Large Intestine     Diverticulosis     Hypertension     Acrochordon     Acute Meniscal Tear     Joint Pain, Localized In The Right Shoulder     Pneumonia     Hypertensive heart disease without heart failure     Past Medical History:   Diagnosis Date     Hypertension       Past Surgical History:   Procedure Laterality Date     HERNIA REPAIR Left      MS REMOVAL OF SPERM DUCT(S)      Description: Surgery Of Male Genitalia Vasectomy;  Recorded: 2007;     MS REMOVAL OF TONSILS,<13 Y/O      Description: Tonsillectomy;  Recorded: 2007;      Family History   Problem Relation Age of Onset     Colon cancer Father      Diabetes Mother      Diabetes Brother       Social History     Socioeconomic History     Marital status:      Spouse name: Not on file     Number of children: Not on file     Years of education: Not on file     Highest education level: Not on file   Occupational History     Not on file   Social Needs     Financial resource strain: Not on file     Food insecurity     Worry: Not on file     Inability: Not " "on file     Transportation needs     Medical: Not on file     Non-medical: Not on file   Tobacco Use     Smoking status: Current Some Day Smoker     Types: Cigars     Smokeless tobacco: Never Used   Substance and Sexual Activity     Alcohol use: Yes     Alcohol/week: 3.0 - 4.0 standard drinks     Types: 3 - 4 Cans of beer per week     Drug use: No     Sexual activity: Not on file   Lifestyle     Physical activity     Days per week: Not on file     Minutes per session: Not on file     Stress: Not on file   Relationships     Social connections     Talks on phone: Not on file     Gets together: Not on file     Attends Latter-day service: Not on file     Active member of club or organization: Not on file     Attends meetings of clubs or organizations: Not on file     Relationship status: Not on file     Intimate partner violence     Fear of current or ex partner: Not on file     Emotionally abused: Not on file     Physically abused: Not on file     Forced sexual activity: Not on file   Other Topics Concern     Not on file   Social History Narrative     Not on file       Current Outpatient Medications   Medication Sig Dispense Refill     lisinopriL-hydrochlorothiazide (PRINZIDE,ZESTORETIC) 10-12.5 mg per tablet Take 1 tablet by mouth daily. 90 tablet 3     No current facility-administered medications for this visit.       Objective:   Vital Signs:   Visit Vitals  /80   Pulse 92   Temp 98.4  F (36.9  C)   Ht 5' 9.25\" (1.759 m)   Wt 219 lb (99.3 kg)   SpO2 98%   BMI 32.11 kg/m         EKG (3/25/16):  Normal sinus rhythm  Normal ECG  When compared with ECG of 24-MAR-2016 20:45,  No significant change was found  Confirmed by VINAYAK RESTREPO MD LOC:[facility] ([id number]) on 3/25/2016 1:09:20 PM    VisionScreening:   Hearing Screening    125Hz 250Hz 500Hz 1000Hz 2000Hz 3000Hz 4000Hz 6000Hz 8000Hz   Right ear:            Left ear:               Visual Acuity Screening    Right eye Left eye Both eyes   Without correction:      With " correction: 20/32 20/40         PHYSICAL EXAM    General Appearance:    Alert, cooperative, no distress, appears stated age.  BMI - 32.11.   Head:    Normocephalic, without obvious abnormality, atraumatic   Eyes:    PERRL, conjunctiva/corneas clear, EOM's intact, fundi     benign, both eyes        Ears:    Normal TM's and external ear canals, both ears   Nose:   Nares normal, septum midline, mucosa normal, no drainage    or sinus tenderness   Throat:   Lips, mucosa, and tongue normal; teeth and gums normal   Neck:   Supple, symmetrical, trachea midline, no adenopathy;        thyroid:  No enlargement/tenderness/nodules; no carotid    bruit or JVD   Back:     Symmetric, no curvature, ROM normal, no CVA tenderness   Lungs:     Clear to auscultation bilaterally, respirations unlabored   Chest wall:    No tenderness or deformity   Heart:    Regular rate and rhythm, S1 and S2 normal, no murmur, rub   or gallop   Abdomen:     Soft, non-tender, bowel sounds active all four quadrants,     no masses, no organomegaly.     Genitalia:    Normal male without lesion, discharge or tenderness.  No inguinal hernia noted.     Rectal:    Normal tone.  Prostate normal/symmetric, no masses or tenderness.   Extremities:   Extremities normal, atraumatic, no cyanosis or edema   Pulses:   2+ and symmetric all extremities   Skin:   Skin color, texture, turgor normal, no rashes or lesions   Lymph nodes:   Cervical, supraclavicular, and axillary nodes normal   Neurologic:   CNII-XII intact. Normal strength, sensation and reflexes       throughout        Assessment Results 1/13/2021   Activities of Daily Living No help needed   Instrumental Activities of Daily Living No help needed   Get Up and Go Score Less than 12 seconds   Mini Cog Total Score 5   Some recent data might be hidden     A Mini Cog score of 0-2 suggests the possibility of dementia, score of 3-5 suggests no dementia    Identified Health Risks:     The patient was counseled and  encouraged to consider modifying their diet and eating habits. He was provided with information on recommended healthy diet options.  Patient's advanced directive was discussed and I am comfortable with the patient's wishes.

## 2021-06-14 NOTE — TELEPHONE ENCOUNTER
Pt returned call. Results and recommendations given. Pt stated understanding. Pt ok with neurosurgery referral. Referral prepped. He is aware he will be called to schedule.     Pt also inquiring if he is able to continue with physical therapy. He has had 1 PT session and has another scheduled for next Friday. Confirmed with Keri that pt ok to proceed with PT. Pt stated understanding.

## 2021-06-14 NOTE — TELEPHONE ENCOUNTER
Dr. Posadas, your patient Otis has a lab appointment for 1/19 for fasting labs. Please place orders in the chart. Thank you.

## 2021-06-14 NOTE — TELEPHONE ENCOUNTER
Reason contacted:  Lab only  Information relayed:  Below message  Additional questions:  No  Further follow-up needed:  No  Okay to leave a detailed message:  No

## 2021-06-14 NOTE — TELEPHONE ENCOUNTER
----- Message from Keri Anne PA-C sent at 1/29/2021  3:16 PM CST -----  I left a message for the patient to call the nurse line back for results.  Please let him know that I reviewed his MRI cervical spine.  Unfortunately, this shows severe narrowing around his spinal cord at C3-4 related to a disc/bone spur causing compression of the spinal cord.  In my opinion he should be evaluated by neurosurgery due to the severity of the stenosis.  Please enter the referral to neurosurgery with diagnosis cervical spinal stenosis.  I am happy to see the patient back to review the imaging as well.

## 2021-06-14 NOTE — PATIENT INSTRUCTIONS - HE
[facility] Scheduling    Please call [phone] to schedule your image(s) (select option #1). There are 3 different locations, see below. You can do walk-in visits for xray only images if you want.     [facility]  [address] [place] [id number]    [facility]   [address] [address]    [place] [id number] [facility] [address]    An order for physical therapy has been provided today.  Someone will call you to schedule physical therapy or you can call [phone] to schedule physical therapy.  It will be very important for you to do your physical therapy exercises on a regular basis to decrease your pain and prevent future flares of pain.

## 2021-06-14 NOTE — PROGRESS NOTES
ASSESSMENT: Otis Brumfield is a 65 y.o. male with past medical history significant for hypertension who presents today for new patient evaluation of chronic midline neck pain with some radiation into the left upper trapezius muscle and intermittent paresthesias left upper extremity..  Patient has not had any recent advanced imaging of the cervical spine.  An MRI cervical spine from 2011 showed multilevel degenerative change.  I suspect degenerative changes have worsened.  Patient's pain is most consistent with cervical facet syndrome and discogenic pain.  I would like to evaluate for possible left-sided neural compromise.  Patient was neurologically intact on exam.    NDI: 22  Who 5:18    PLAN:  A shared decision making model was used.  The patient's values and choices were respected.  The following represents what was discussed and decided upon by the physician assistant and the patient.      1.  DIAGNOSTIC TESTS: I reviewed the MRI cervical spine from 2011.  Also reviewed the x-ray left shoulder.  I ordered an updated MRI cervical spine for further evaluation.    2.  PHYSICAL THERAPY:  Discussed the importance of core strengthening, ROM, stretching exercises with the patient and how each of these entities is important in decreasing pain.  Explained to the patient that the purpose of physical therapy is to teach the patient a home exercise program.  These exercises need to be performed every day in order to decrease pain and prevent future occurrences of pain.   I entered a referral for physical therapy.  I specifically asked that they trial cervical traction.  Patient has a sensation that he would get relief of his neck could be stretched.    3.  MEDICATIONS: No changes are made to the patient's medications.  He can continue using ibuprofen and naproxen as needed.    4.  INTERVENTIONS: No interventions were ordered.  Patient may benefit from interventional pain management if he fails to improve with conservative  treatment, depending on the results of an updated MRI cervical spine.    5.  PATIENT EDUCATION: Patient is in agreement the above plan.  All questions were answered.    6.  FOLLOW-UP:   A nurse will call the patient with the results of his MRI cervical spine.  At that time will either have him trial physical therapy for 4 weeks and then follow-up with me versus have him follow-up to review the imaging studies.  If he has questions or concerns, he should not hesitate to call.        SUBJECTIVE:  Otis Brumfield  Is a 65 y.o. male who presents today in consultation at request of Dr. Posadas for new patient evaluation of chronic neck pain.  Patient reports that he has had neck pain for at least 10 years.  He states that he retired in May 2020.  He states that he is not as active and is sitting a lot more.  He has noticed since May that his neck pain has been worsening.  He discussed this with his primary care provider at his physical and he was referred to our clinic for further evaluation and treatment.    Patient complains of midline neck pain.  Pain is most severe in the upper cervical region.  Patient states that recently he has had a tight muscle type discomfort through the left upper trapezius muscle, but he is not certain that it is related to his chronic neck pain.  He denies any pain radiating further down the arm.  He does note that when he lies on his left side he experiences numbness and tingling through the left arm and he gets a cramp or trigger finger that affects his left fourth finger.  He states that this does not happen when he lies on his right side.  He denies weakness in the arms.  His neck pain tends to get worse as the day goes on.  He states that by late afternoon or early evening he has to lie down rather than sit up to watch TV.  He states it feels like there is too much weight on his neck.  He has a sensation that if someone could just pull his neck up he would get relief.  He used to be  able to crack his neck himself that would give him some relief.  He has not been able to do that recently.  Pain is also aggravated with cervical extension.  He has noticed his range of motion is worsening in his neck, although he has been trying to stretch it.  He notices this most when he is driving.  Pain is alleviated with lying down and applying cold.  Patient denies any difficulty with fine motor activities.  Denies difficulty with balance.  Denies loss of bowel or bladder control.  Denies any recent fevers, chills, or sweats.    Patient has been doing some home exercises for his neck, but he has not had formal physical therapy.  He does not go to a chiropractor.  He is never had any spine surgeries or spine injections.  He uses ibuprofen and naproxen as needed.  He is not sure if this is helpful because he typically takes it before bed.    Current Outpatient Medications on File Prior to Visit   Medication Sig Dispense Refill     lisinopriL-hydrochlorothiazide (PRINZIDE,ZESTORETIC) 10-12.5 mg per tablet Take 1 tablet by mouth daily. 90 tablet 3         No Known Allergies    Past Medical History:   Diagnosis Date     Hypertension         Patient Active Problem List   Diagnosis     Benign Polyps Of The Large Intestine     Diverticulosis     Hypertension     Acrochordon     Acute Meniscal Tear     Joint Pain, Localized In The Right Shoulder     Pneumonia     Hypertensive heart disease without heart failure       Past Surgical History:   Procedure Laterality Date     HERNIA REPAIR Left      [place] REMOVAL OF SPERM DUCT(S)      Description: Surgery Of Male Genitalia Vasectomy;  Recorded: 05/24/2007;     [place] REMOVAL OF TONSILS,<11 Y/O      Description: Tonsillectomy;  Recorded: 05/24/2007;       Family History   Problem Relation Age of Onset     Colon cancer Father      Diabetes Mother      Diabetes Brother        Social history: Patient is .  He is retired.  He had worked for [employer] in [employer].  He drinks  0-1 alcoholic beverages per day.  He denies tobacco use.  Denies illicit drug use.    ROS: Positive for weight gain, muscle pain.  Specifically negative for dysphagia, imbalance, fine motor skill difficulties, bowel/bladder dysfunction, fevers,chills, appetite changes, unexplained weight loss.   Otherwise 13 systems reviewed are negative.  Please see the patient's intake questionnaire from today for details.      OBJECTIVE:  PHYSICAL EXAMINATION:  CONSTITUTIONAL:  Vital signs as above.  No acute distress.  The patient is well nourished and well groomed.  PSYCHIATRIC:  The patient is awake, alert, oriented to person, place, time and answering questions appropriately with clear speech.    HEENT:  Normocephalic, atraumatic.  Sclera clear.    SKIN:  Skin over the face, bilateral upper extremities, and neck is clean, dry, intact without rashes.  LYMPH NODES:  No palpable or tender anterior/posterior cervical, submandibular, or supraclavicular lymph nodes.    MUSCLE STRENGTH:  5/5 strength for the bilateral shoulder abductors, elbow flexors/extensors, wrist extensors, finger flexors/abductors.  NEURO:  CN III-XII are grossly intact.  2/4 symmetric biceps, brachioradialis, triceps, patellar reflexes bilaterally.  Sensation to light touch intact bilateral upper extremities 0.  Negative Tipton's bilaterally.    VASCULAR:  2/4 radial pulses bilaterally.  Warm upper limbs bilaterally.  Capillary refill in the upper extremities is less than 1 second.  MUSCULOSKELETAL: No significant tenderness to palpation cervical spinous processes.  No significant tenderness palpation cervical paraspinous muscles or upper trapezius muscles.  Patient does have hypertonicity left upper trapezius muscle.  Cervical flexion intact.  Cervical extension severely restricted with reproduction of pain.  Lateral rotation right minimally restricted.  Lateral rotation left moderately restricted.    RESULTS:    X-ray left shoulder from [facility]  dated May 23, 2018 showed mild degenerative and hypertrophic bony changes.    I reviewed an MRI cervical spine from April 15, 2011.  At C3-4 there is a small central disc herniation which abuts the ventral aspect of the cord.  At C5-6 there is a left paracentral disc osteophyte complex with left C6 nerve root impingement.  At C6-7 there is a disc osteophyte complex with mild bilateral foraminal stenosis.

## 2021-06-15 NOTE — PROGRESS NOTES
Neurosurgery consultation was requested by: YASMINE Bearden  Pain: Minor neck pain   Radicular Pain is present: left shoulder and left upper arm   Lhermitte sign: no   Motor complaints: Minor weakness in left upper arm on and off  Sensory complaints: Numbness in left shoulder and down left arm to elbow   Gait and balance issues: Denies   Bowel or bladder issues: Denies   Duration of SX is: few months   The symptoms are worse with: end of day   The symptoms are better with: laying down   Injury: down   Severity is:mild - moderate  Patient has tried the following conservative measures: He has done 2 sessions of physical therapy   Neck Disability Index Questionnaire  1. PAIN INTENSITY  1- The pain is mild at the moment  2. PERSONAL CARE  1- I can look after myself normally but it causes extra pain  3. LIFTING  1- I can lift heavy weights, but it gives me extra pain  4. READING  3- I cannot read as much as I want because of moderate pain in my neck.  5. HEADACHES  1- I have slight headaches, which come infrequently.  6. CONCENTRATION  1- I can concentrate fully when I want to with slight difficulty.  7. WORK  2-I can do most of my usual work, but no more  8. DRIVING  1- I can drive my car as long as I want with slight pain in my neck.  9. SLEEPING  2- My sleep is mildly disturbed (1-2 hrs sleepless).  10. RECREATION  1- I am able to engage in all recreational activities with some pain in my neck.  SCORE:__14__ x2=__28%____  YUSUF Leon

## 2021-06-15 NOTE — PROGRESS NOTES
[facility] Rehabilitation Daily Progress Note    Patient Name: Otis Brumfield  Date: 2/5/2021  Visit #: 2  PTA visit #:    Referral Diagnosis: Cervical spine pain  Referring provider: Keri Anne PA-C  Visit Diagnosis:     ICD-10-CM    1. Cervicalgia  M54.2    2. Poor posture  R29.3    3. Generalized muscle weakness  M62.81        Assessment from Initial Evaluation:  Otis Brumfield is a 65 y.o. male who presents to therapy today with chief complaints of chronic neck pain. Onset date of sx was more than 10 years ago and has progressively increased this week.  Pt denies injury to the neck.  Pain symptoms are aching, stiffness with occasional numbness in the L arm.  Functional impairments include decrease cervical rotation and flexion/extnesion for ADLs, reading, home tasks.  Patient will benefit from skilled therapy to increase ROM, increase strength and improved overall functional mobility, ADLs and self cares. .   Impairments in  pain, posture, ROM, joint mobility, strength  The POC is dynamic and will be modified on an ongoing basis.  Barriers to achieving goals as noted in the assessment section may affect outcome.    Precautions / Restrictions : none      Assessment:   Doing well with the stretching, will meet with the neurosurgeons, changes to HEP as needed to manage pain,    HEP/POC compliance is  good .  Patient demonstrates understanding/independence with home program.    Goal Status:  Pt. will be independent with home exercise program in : 6 weeks  Pt. will have improved quality of sleep: with less pain;waking less times/night;in 6 weeks  Pt. will improve posture : in sitting;in standing;in 6 weeks  Patient Turn [place]: for driving;for watching traffic;for computer;with less pain;with less difficulty;in 6 weeks  Patient will look up / down: for drinking;for reading;for computer work;with less pain;with less difficulty;in 6 weeks    No data recorded    Plan / Patient Education:     Continue  "with initial plan of care.  Progress with home program as tolerated.  Plan for next visit:  manual distraction vs trial for cervical traction, progress cervical and scapular strengthening   Subjective:     Pain Ratin-3  The chin tuck are painful, the other exercises are ok. Patient felt good after the stretching last session. Had MRI, demonstrates severe central stenosis around the spinal cord, he has a referral for neurosurgery.     Objective:         Exercise #1: self towel cervical extension x 10 reps  Comment #1: self towel cervical rotation SNAGS x 15 reps  Exercise #2: scap set rows x 10 reps  Comment #2: seated chin tucks - hold due to pain with this  Exercise #3: cervical isometrics 3\" hold x 5 reps flexion, extension and side bending bilaterally - added to HEP today  Comment #3: B shoulder abduction      Appt time: 7:00AM  - 7:30 AM     Treatment Today   2021  TREATMENT MINUTES COMMENTS   Evaluation     Self-care/ Home management     Manual therapy 20 Supine cervical distraction   Supine STM and suboccipital release   Supine PAs to T1-2. C6-7 (C/T junction   Neuromuscular Re-education     Therapeutic Activity     Therapeutic Exercises 10 Exercises as above    Gait training     Modality__________________                Total 30     Blank areas are intentional and mean the treatment did not include these items.       Evelyn Fabian, PT, DPT, CLT-TAMMY  2021      "

## 2021-06-15 NOTE — PATIENT INSTRUCTIONS - HE
Dr. Hylton offered you a Anterior Cervical Diskectomy and fusion at C3-C6 vs Cervical Laminoplasty C3-C6 possibly 7 . Continue with physical therapy. If you would like to proceed with surgery you can call or would like to come back with spouse to further discuss you can do that as well.

## 2021-06-15 NOTE — PROGRESS NOTES
[facility] Rehabilitation Daily Progress Note    Patient Name: Otis Brumfield  Date: 3/2/2021  Visit #: 5  PTA visit #:    Referral Diagnosis: Cervical spine pain  Referring provider: Keri Anne PA-C  Visit Diagnosis:     ICD-10-CM    1. Cervicalgia  M54.2    2. Poor posture  R29.3    3. Generalized muscle weakness  M62.81        Assessment from Initial Evaluation:  Otis Brumfield is a 65 y.o. male who presents to therapy today with chief complaints of chronic neck pain. Onset date of sx was more than 10 years ago and has progressively increased this week.  Pt denies injury to the neck.  Pain symptoms are aching, stiffness with occasional numbness in the L arm.  Functional impairments include decrease cervical rotation and flexion/extnesion for ADLs, reading, home tasks.  Patient will benefit from skilled therapy to increase ROM, increase strength and improved overall functional mobility, ADLs and self cares. .   Impairments in  pain, posture, ROM, joint mobility, strength  The POC is dynamic and will be modified on an ongoing basis.  Barriers to achieving goals as noted in the assessment section may affect outcome.    Precautions / Restrictions : none      Assessment:   Doing well with the stretching, will meet with the neurosurgeons, changes to HEP as needed to manage pain,    HEP/POC compliance is  good .  Patient demonstrates understanding/independence with home program.    Goal Status:  Pt. will be independent with home exercise program in : 6 weeks  Pt. will have improved quality of sleep: with less pain;waking less times/night;in 6 weeks  Pt. will improve posture : in sitting;in standing;in 6 weeks  Patient Turn [place]: for driving;for watching traffic;for computer;with less pain;with less difficulty;in 6 weeks  Patient will look up / down: for drinking;for reading;for computer work;with less pain;with less difficulty;in 6 weeks    Plan / Patient Education:     Continue with initial plan of  "care.  Progress with home program as tolerated.  Plan for next visit: continue with manual distraction vs trial for cervical traction, progress cervical and scapular strengthening if indicated   Subjective:     Pain Ratin-3   Met with Dr Hylton last week. Due to improvements in pain and motion they plan to hold off surgery until fall if able.   Pain is improved. Making adjustements during day to decrease pain, change posture, stretching or exercises help the pain when starts. Headaches are much less.     Objective:     Exercise #1: self towel cervical extension x 10 reps  Comment #1: self towel cervical rotation SNAGS x 15 reps  Exercise #2: scap set rows x 10 reps  Comment #2: seated chin tucks - hold due to pain with this  Exercise #3: cervical isometrics 3\" hold x 5 reps flexion, extension and side bending bilaterally - added to HEP today  Comment #3: B shoulder abduction      Appt time: 7:32 AM  - 8:00AM     Treatment Today   3/2/2021  TREATMENT MINUTES COMMENTS   Evaluation     Self-care/ Home management     Manual therapy 23 Supine cervical distraction   Supine STM and suboccipital release   Supine PAs to T1-2. C6-7 (C/T junction   Neuromuscular Re-education     Therapeutic Activity     Therapeutic Exercises 5 UBE F/B WL 4 x 5 min     Reviewed HEP as above    Gait training     Modality__________________                Total 28    Blank areas are intentional and mean the treatment did not include these items.       Evelyn Fabian, PT, DPT, CLT-TAMMY  3/2/2021  "

## 2021-06-15 NOTE — PROGRESS NOTES
[facility] Rehabilitation Daily Progress Note    Patient Name: Otis Brumfield  Date: 2/12/2021  Visit #: 3  PTA visit #:    Referral Diagnosis: Cervical spine pain  Referring provider: Keri Anne PA-C  Visit Diagnosis:     ICD-10-CM    1. Cervicalgia  M54.2    2. Poor posture  R29.3    3. Generalized muscle weakness  M62.81        Assessment from Initial Evaluation:  Otis Brumfield is a 65 y.o. male who presents to therapy today with chief complaints of chronic neck pain. Onset date of sx was more than 10 years ago and has progressively increased this week.  Pt denies injury to the neck.  Pain symptoms are aching, stiffness with occasional numbness in the L arm.  Functional impairments include decrease cervical rotation and flexion/extnesion for ADLs, reading, home tasks.  Patient will benefit from skilled therapy to increase ROM, increase strength and improved overall functional mobility, ADLs and self cares. .   Impairments in  pain, posture, ROM, joint mobility, strength  The POC is dynamic and will be modified on an ongoing basis.  Barriers to achieving goals as noted in the assessment section may affect outcome.    Precautions / Restrictions : none      Assessment:   Doing well with the stretching, will meet with the neurosurgeons, changes to HEP as needed to manage pain,    HEP/POC compliance is  good .  Patient demonstrates understanding/independence with home program.    Goal Status:  Pt. will be independent with home exercise program in : 6 weeks  Pt. will have improved quality of sleep: with less pain;waking less times/night;in 6 weeks  Pt. will improve posture : in sitting;in standing;in 6 weeks  Patient Turn [place]: for driving;for watching traffic;for computer;with less pain;with less difficulty;in 6 weeks  Patient will look up / down: for drinking;for reading;for computer work;with less pain;with less difficulty;in 6 weeks    Plan / Patient Education:     Continue with initial plan of  "care.  Progress with home program as tolerated.  Plan for next visit:  manual distraction vs trial for cervical traction, progress cervical and scapular strengthening   Subjective:     Pain Ratin-3   Had meeting with Dr Hylton, she recommends a fusion, patient is going to discuss it further with wife and surgeon before making decision. In the mean time will continue therapy. Patient reports he does feel better with being aware of posutre and stretching at home.   Feels like the ROM is improved.     Objective:     Exercise #1: self towel cervical extension x 10 reps  Comment #1: self towel cervical rotation SNAGS x 15 reps  Exercise #2: scap set rows x 10 reps  Comment #2: seated chin tucks - hold due to pain with this  Exercise #3: cervical isometrics 3\" hold x 5 reps flexion, extension and side bending bilaterally - added to HEP today  Comment #3: B shoulder abduction      Appt time: 9:02 AM  - 9:30 AM     Treatment Today   2021  TREATMENT MINUTES COMMENTS   Evaluation     Self-care/ Home management     Manual therapy 23 Supine cervical distraction   Supine STM and suboccipital release   Supine PAs to T1-2. C6-7 (C/T junction   Neuromuscular Re-education     Therapeutic Activity     Therapeutic Exercises 5 UBE F/B x 3 min   Supine chin tucks with extension x 10 reps - more stiff with this.    Gait training     Modality__________________                Total 28     Blank areas are intentional and mean the treatment did not include these items.       Evelyn Fabian, PT, DPT, CLT-TAMMY  2021    "

## 2021-06-15 NOTE — PROGRESS NOTES
[facility] Rehabilitation Daily Progress Note    Patient Name: Otis Brumfield  Date: 2/23/2021  Visit #: 4  PTA visit #:    Referral Diagnosis: Cervical spine pain  Referring provider: Keri Anne PA-C  Visit Diagnosis:     ICD-10-CM    1. Cervicalgia  M54.2    2. Poor posture  R29.3    3. Generalized muscle weakness  M62.81        Assessment from Initial Evaluation:  Otis Brumfield is a 65 y.o. male who presents to therapy today with chief complaints of chronic neck pain. Onset date of sx was more than 10 years ago and has progressively increased this week.  Pt denies injury to the neck.  Pain symptoms are aching, stiffness with occasional numbness in the L arm.  Functional impairments include decrease cervical rotation and flexion/extnesion for ADLs, reading, home tasks.  Patient will benefit from skilled therapy to increase ROM, increase strength and improved overall functional mobility, ADLs and self cares. .   Impairments in  pain, posture, ROM, joint mobility, strength  The POC is dynamic and will be modified on an ongoing basis.  Barriers to achieving goals as noted in the assessment section may affect outcome.    Precautions / Restrictions : none      Assessment:   Doing well with the stretching, will meet with the neurosurgeons, changes to HEP as needed to manage pain,    HEP/POC compliance is  good .  Patient demonstrates understanding/independence with home program.    Goal Status:  Pt. will be independent with home exercise program in : 6 weeks  Pt. will have improved quality of sleep: with less pain;waking less times/night;in 6 weeks  Pt. will improve posture : in sitting;in standing;in 6 weeks  Patient Turn [place]: for driving;for watching traffic;for computer;with less pain;with less difficulty;in 6 weeks  Patient will look up / down: for drinking;for reading;for computer work;with less pain;with less difficulty;in 6 weeks    Plan / Patient Education:     Continue with initial plan of  "care.  Progress with home program as tolerated.  Plan for next visit: patient to follow up with the MD, then continue with manual distraction vs trial for cervical traction, progress cervical and scapular strengthening if indicated   Subjective:     Pain Ratin-3     Will ee Dr Hylton again on Friday to discuss options further.   Feels like his overall pain is improved he is more aware of his posture in general and has increase rotation in cervical spine. Extension is still harder to do     Objective:     Exercise #1: self towel cervical extension x 10 reps  Comment #1: self towel cervical rotation SNAGS x 15 reps  Exercise #2: scap set rows x 10 reps  Comment #2: seated chin tucks - hold due to pain with this  Exercise #3: cervical isometrics 3\" hold x 5 reps flexion, extension and side bending bilaterally - added to HEP today  Comment #3: B shoulder abduction      Appt time: 8:32 AM  - 9:00AM     Treatment Today   2021  TREATMENT MINUTES COMMENTS   Evaluation     Self-care/ Home management     Manual therapy 23 Supine cervical distraction   Supine STM and suboccipital release   Supine PAs to T1-2. C6-7 (C/T junction   Neuromuscular Re-education     Therapeutic Activity     Therapeutic Exercises 5 Reviewed HEP as above    Gait training     Modality__________________                Total 23    Blank areas are intentional and mean the treatment did not include these items.       Evelyn Fabian, PT, DPT, CLT-TAMMY  2021  "

## 2021-06-15 NOTE — PROGRESS NOTES
NEUROSURGERY CONSULTATION NOTE    2/9/2021       CHIEF COMPLAINT: Neck and left arm pain    HPI:    Otis Brumfield is a 65 y.o. male who is sent to us in consultation by Terry Posadas for evaluation of cervical radiculopathy.       Onset: 10+ years of neck pain which has been more appreciable over the last ~6 mo  Character: Left sided neck pain which has been at least more appreciable vs worsening; he feels like he has some pain that goes over to the left arm and will radiate down the medial arm to the level of the bicep- most noticeable in the evenings. Denies any pain radiating past the elbow. When it gets aggravated, the head just feels like it weighs 50 lbs. Neck pain in the morning is less noticeable   Average pain:   Max pain: daily, at the end of the day- 7-8/10    Numbness/tingling: Into the medial arm along the bicep down to the level of the elbow  Weakness: Notes he retired in May 2020 and he has been less active so he perceives some general deconditioning    Aggravating factors: Forward flexion activities, puzzles, reading can aggravate  Relieving factors: Staying busy helps as a distraction, keeps his mind busy. Stretching can be helpful. Sleeping is more helpful (although there are some nights where he is tossing and turning) as he will often awaken in the morning with minimal discomfortable    Pain management: Physical therapy- has had two sessions; ibuprofen PRN without significant benefit    Denies issues with imbalance or incoordination. No issues buttoning buttons, zipping zippers.  strength feels normal.    Past Medical History:   Diagnosis Date     Acrochordon     Created by Conversion  Replacement Utility updated for latest IMO load     Benign Polyps Of The Large Intestine     Created by Conversion      Diverticulosis     Created by Conversion  Replacement Utility updated for latest IMO load     Hypertension      Past Surgical History:   Procedure Laterality Date     HERNIA REPAIR Left       SC REMOVAL OF SPERM DUCT(S)      Description: Surgery Of Male Genitalia Vasectomy;  Recorded: 05/24/2007;     SC REMOVAL OF TONSILS,<11 Y/O      Description: Tonsillectomy;  Recorded: 05/24/2007;       REVIEW OF SYSTEMS:  Pt denies changes in bowel or bladder habits. No incontinence. A full 14 point review of systems was otherwise completed and is negative aside from that mentioned above in the HPI    MEDICATIONS:  Current Outpatient Medications   Medication Sig Dispense Refill     lisinopriL-hydrochlorothiazide (PRINZIDE,ZESTORETIC) 10-12.5 mg per tablet Take 1 tablet by mouth daily. 90 tablet 3     No current facility-administered medications for this visit.          ALLERGIES/SENSITIVITIES:     No Known Allergies    PERTINENT SOCIAL HISTORY:   Social History     Socioeconomic History     Marital status:      Spouse name: None     Number of children: None     Years of education: None     Highest education level: None   Occupational History     None   Social Needs     Financial resource strain: None     Food insecurity     Worry: None     Inability: None     Transportation needs     Medical: None     Non-medical: None   Tobacco Use     Smoking status: Current Some Day Smoker     Types: Cigars     Smokeless tobacco: Never Used   Substance and Sexual Activity     Alcohol use: Yes     Alcohol/week: 3.0 - 4.0 standard drinks     Types: 3 - 4 Cans of beer per week     Drug use: No     Sexual activity: None   Lifestyle     Physical activity     Days per week: None     Minutes per session: None     Stress: None   Relationships     Social connections     Talks on phone: None     Gets together: None     Attends Alevism service: None     Active member of club or organization: None     Attends meetings of clubs or organizations: None     Relationship status: None     Intimate partner violence     Fear of current or ex partner: None     Emotionally abused: None     Physically abused: None     Forced sexual  "activity: None   Other Topics Concern     None   Social History Narrative     None         FAMILY HISTORY:  Family History   Problem Relation Age of Onset     Colon cancer Father      Diabetes Mother      Diabetes Brother         PHYSICAL EXAM:     Constitution: /89   Pulse 70   Ht 5' 9.25\" (1.759 m)   Wt (!) 224 lb 4.8 oz (101.7 kg)   SpO2 98%   BMI 32.88 kg/m  .   Awake, alert and in NAD  Eyes: Conjugate gaze. Conjunctiva benign without icterus or injection  Heart: RRR  Lungs: Non-labored respiration without accessory muscle use  Skin: No obvious rash or lesion  Psych: Appropriate mood and affect, alert and oriented x 3  Mental Status:  Speech is fluent.  Recent and remote memory are intact.  Attention span and concentration are normal.     Cranial Nerves:  CN2: No funduscopic exam performed. CN3,4 & 6: Pupillary light response, lateral and vertical gaze normal.  No nystagmus. CN7: No facial weakness, smile, facial symmetry intact. CN8: Intact to spoken voice.      Motor: Normal bulk and tone all muscle groups of upper and lower extremities.     Right Left  Right Left   Deltoid 5 5 Hip flexion 5 5   Biceps 5 5 Hip extension 5 5   Triceps 5 5 Knee flexion 5 5   Wrist ex 5 5 Knee ex 5 5   Wrist flex 5 5 Dorsiflex 5 5   Finger ex 5 5 Plantar flex 5 5   Hand intrinsic 5 5 EHL 5 5    Full Full         Sensory: Sensation intact bilaterally to light touch and temperature throughout. Sensation is intact to pinprick in the bilateral LE. Vibratory sense is intact in the bl great toe.     Coordination:  Gait is WNL. Pt is able to heel and toe walk without difficulty. Tandem gait is characterized by mild incoordination. MITCHEL in the UE is WNL     Reflexes; 2+ supinator, biceps, triceps. 2+ patellar and achilles. No bañuelos. 1 beat of clonus. Toes are down-going bilaterally    Musculoskeletal: Negative straight leg raise bilaterally.Cervical range of motion may be mildly limited in extension but otherwise appears " WNL    IMAGING: I personally reviewed all radiographic images    MRI cervical spine 1/29/2021: Patient has evidence of multilevel cervical spondylosis with multilevel cervical degenerative disc disease.  At C3-4, there is a broad-based disc osteophyte complex which abuts the ventral cord and causes deformation with severe central canal stenosis.  The spinal canal diameter measures approximately 7.5 mm in greatest dimension.  At C4-5, there is mild uncovertebral joint hypertrophy, left facet arthropathy with some moderate foraminal stenosis.  At C5-6, there is uncovertebral joint hypertrophy bilaterally, flattening of the ventral spinal cord there is moderate left, mild to moderate right foraminal stenosis.  At C6-7, there is a broad-based disc osteophyte complex, uncovertebral joint hypertrophy bilaterally with moderate bilateral foraminal stenosis    Cervical spine flexion-extension x-rays 2/9/2021: Patient has advanced disc degeneration throughout the cervical spine with severe disc height loss at C3-4, C5-6, C6-7.  At C4-5 there is an anterior listhesis which measures approximately 3.5 mm in forward flexion and reduces to approximately 2 mm in extension    CONSULTATION ASSESSMENT AND PLAN:    Otis Brumfield is a 65 y.o. male who has evidence of multilevel cervical spondylosis, cervical degenerative disc disease, cervical spondylolisthesis, severe central canal stenosis with cord compression who has signs and symptoms of left cervical radiculopathy (most consistent with C5)    I reviewed Mr. Brumfield's imaging with him today in clinic. He has no clinical signs or symptoms of myelopathy but he does have severe spinal stenosis on MRI at C3-4 and we did discuss the natural history of cervical myelopathy as well as the risk of acute spinal cord injury. For his radiculopathy, this appears most consistent with a C5 radiculopathy and on imaging he has evidence of spondylolithesis at C4-5 which demonstrates borderline  movement (2-3mm). I noted that to treat this he would likely be best served with fusion. Given his cord compression above and below this, I would recommend a C3-6 ACDF. We discussed the risks, benefits and alternatives to surgery. He asked about motion preserving surgery and I noted that he could be a candidate for laminoplasty, but given his anterolisthesis at C4-5 and that he would require foraminotomy at that level for his radiculopathy, I think there is a moderate risk he would require an additional procedure with C4-5 ACDF post-operatively as his symptoms may not resolve or they may return. He would like to consider his options and discuss things further with his wife. He will be in contact if he wishes to pursue surgery. He will continue to do physical therapy for now.    I spent more than 60 minutes in this apt, examining the pt, reviewing the scans, reviewing notes from chart, discussing treatment options with risks and benefits and coordinating care. >50 % clinic time was spent in face to face counseling and coordinating care    Roestte Hylton MD      Cc:   Terry Posadas MD

## 2021-06-15 NOTE — PROGRESS NOTES
Neurosurgery Progress Note  02/26/21    A/P: Otis Brumfield is a 65 y.o. male who has evidence of multilevel cervical spondylosis, cervical degenerative disc disease, cervical spondylolisthesis, severe central canal stenosis with cord compression who has signs and symptoms of left cervical radiculopathy (most consistent with C5)     Dov returned to clinic today to again review his imaging and discuss surgical options. We had a long conversation where I noted that he appears most symptomatic from his spondylolisthesis and foraminal stenosis at C4-5. However, he also has severe spinal stenosis at C3-4 as well as cord flattening at C5-6. We discussed options for surgical intervention and I recommended a C3-6 ACDF. He is going to continue to think about his options and he will be in contact with us when he feels ready to proceed with surgery.    S: Continues with chronic neck pain, left side continues to be worse than the right. Pain is radiating down the medial arm through the bicep to the elbow.   Average pain: 1-2/10, max pain can still get high: 7+/10    O:  Vitals:    02/26/21 1016   BP: (!) 148/92   Pulse: 62   Resp: 16   SpO2: 98%     Body mass index is 32.84 kg/m .    General: Awake, alert, NAD  HEENT: AT/NC, EOMI, face symmetric, oral mucosa moist and pink  Neuro: Awake, alert, speech is clear and content is appropriate. MAEW x 4. Sensation is intact to temperature and LT.   Musculoskeletal: Negative straight leg raise bl  Psych: Appropriate mood and affect, pleasant, cooperative, alert and oriented x 3    I personally reviewed and visualized the following radiographic images:    MRI cervical spine 1/29/2021: Patient has evidence of multilevel cervical spondylosis with degenerative disc disease.  At C3-4, there is a broad-based disc osteophyte complex which is causing severe central canal stenosis with cord compression.  Central canal is reduced to a diameter of 7 mm.  No evidence of cord signal abnormality.   At C4-5, there is a left eccentric disc osteophyte complex with uncovertebral joint hypertrophy which is resulting in moderate foraminal stenosis and some subtle flattening of the ventral cord on the left.  C5-6, there is a broad-based disc osteophyte complex, bilateral uncovertebral joint hypertrophy which is causing ventral cord flattening with some central canal stenosis and there is moderate to severe left foraminal stenosis.  At C6-7, there is a broad-based disc osteophyte complex which narrows the ventral thecal sac and is causing bilateral moderate to severe foraminal stenosis without obvious cord compression.    Cervical spine flexion-extension x-rays 2/9/2021: At C3-4 there is mild retrolisthesis which appears unchanged between flexion and extension imaging.  There is anterior listhesis of C4 and C5 which measures about 2 mm in neutral, 3.5 mm in flexion and 2 mm in extension.  Advanced disc degeneration noted at C5-6 and C6-7.    I spent greater than 45 minutes in this encounter for which >50% of the time was spent in face to face discussion obtaining the relevant history, obtaining the exam, reviewing relevant images and discussing options for treatment/management.    Rosette Hylton MD

## 2021-06-15 NOTE — PROGRESS NOTES
[facility] Rehabilitation Daily Progress Note    Patient Name: Otis Brumfield  Date: 3/9/2021  Visit #: 6  PTA visit #:    Referral Diagnosis: Cervical spine pain  Referring provider: Terry Posadas MD  Visit Diagnosis:     ICD-10-CM    1. Cervicalgia  M54.2    2. Poor posture  R29.3    3. Generalized muscle weakness  M62.81        Assessment from Initial Evaluation:  Otis Brumfield is a 65 y.o. male who presents to therapy today with chief complaints of chronic neck pain. Onset date of sx was more than 10 years ago and has progressively increased this week.  Pt denies injury to the neck.  Pain symptoms are aching, stiffness with occasional numbness in the L arm.  Functional impairments include decrease cervical rotation and flexion/extnesion for ADLs, reading, home tasks.  Patient will benefit from skilled therapy to increase ROM, increase strength and improved overall functional mobility, ADLs and self cares. .   Impairments in  pain, posture, ROM, joint mobility, strength  The POC is dynamic and will be modified on an ongoing basis.  Barriers to achieving goals as noted in the assessment section may affect outcome.    Precautions / Restrictions : none      Assessment:   Doing well with the stretching, will meet with the neurosurgeons, changes to HEP as needed to manage pain,    HEP/POC compliance is  good .  Patient demonstrates understanding/independence with home program.    Goal Status:  Pt. will be independent with home exercise program in : 6 weeks  Pt. will have improved quality of sleep: with less pain;waking less times/night;in 6 weeks  Pt. will improve posture : in sitting;in standing;in 6 weeks  Patient Turn Head: for driving;for watching traffic;for computer;with less pain;with less difficulty;in 6 weeks  Patient will look up / down: for drinking;for reading;for computer work;with less pain;with less difficulty;in 6 weeks    Plan / Patient Education:     Continue with initial plan of  "care.  Progress with home program as tolerated.  Plan for next visit: continue with manual distraction vs trial for cervical traction, progress cervical and scapular strengthening if indicated   Subjective:     Pain Ratin-3   Each week feels he is getting better, less pain more movement. No headaches    Objective:     Exercise #1: self towel cervical extension x 10 reps  Comment #1: self towel cervical rotation SNAGS x 15 reps  Exercise #2: scap set rows x 10 reps  Comment #2: seated chin tucks - hold due to pain with this  Exercise #3: cervical isometrics 3\" hold x 5 reps flexion, extension and side bending bilaterally - added to HEP today  Comment #3: B shoulder abduction    Appt time: 12:02 PM  - 12:25 PM     Treatment Today   3/9/2021  TREATMENT MINUTES COMMENTS   Evaluation     Self-care/ Home management     Manual therapy 23 Supine cervical distraction   Supine STM and suboccipital release   Supine PAs to T1-2. C6-7 (C/T junction   Neuromuscular Re-education     Therapeutic Activity     Therapeutic Exercises  UBE F/B WL 4 x 5 min     Reviewed HEP as above    Gait training     Modality__________________                Total 23    Blank areas are intentional and mean the treatment did not include these items.       Evelyn Fabian, PT, DPT, CLT-TAMMY  3/9/2021  "

## 2021-06-16 PROBLEM — J18.9 PNEUMONIA: Status: ACTIVE | Noted: 2018-06-25

## 2021-06-16 PROBLEM — I11.9 HYPERTENSIVE HEART DISEASE WITHOUT HEART FAILURE: Status: ACTIVE | Noted: 2019-08-20

## 2021-06-16 NOTE — PROGRESS NOTES
[facility] Rehabilitation Daily Progress Note    Patient Name: Otis Brumfield  Date: 4/1/2021  Visit #: 7  PTA visit #:    Referral Diagnosis: Cervical spine pain  Referring provider: Terry Posadas MD  Visit Diagnosis:     ICD-10-CM    1. Cervicalgia  M54.2    2. Poor posture  R29.3    3. Generalized muscle weakness  M62.81        Assessment from Initial Evaluation:  Otis Brumfield is a 65 y.o. male who presents to therapy today with chief complaints of chronic neck pain. Onset date of sx was more than 10 years ago and has progressively increased this week.  Pt denies injury to the neck.  Pain symptoms are aching, stiffness with occasional numbness in the L arm.  Functional impairments include decrease cervical rotation and flexion/extnesion for ADLs, reading, home tasks.  Patient will benefit from skilled therapy to increase ROM, increase strength and improved overall functional mobility, ADLs and self cares. .   Impairments in  pain, posture, ROM, joint mobility, strength  The POC is dynamic and will be modified on an ongoing basis.  Barriers to achieving goals as noted in the assessment section may affect outcome.    Precautions / Restrictions : none      Assessment:    Doing well with the stretching, walking and HEP. Pain is better managed right now and benefits from manual therapy to decrease tension and pain and improve joint mobility. Will plan to hold chart x 30 days for trial at home, If he does not return to therapy after that time then will be discharged from therapy.    HEP/POC compliance is  good .  Patient demonstrates understanding/independence with home program.    Goal Status:  Pt. will be independent with home exercise program in : 6 weeks MET  Pt. will have improved quality of sleep: with less pain;waking less times/night;in 6 weeks MET   Pt. will improve posture : in sitting;in standing;in 6 weeks MET   Patient Turn Head: for driving;for watching traffic;for computer;with less  "pain;with less difficulty;in 6 weeks MET   Patient will look up / down: for drinking;for reading;for computer work;with less pain;with less difficulty;in 6 weeks  MET     Plan / Patient Education:     Continue with initial plan of care.  Progress with home program as tolerated.  Plan for next visit:  Hold chart x 30 days, plan to discharge if he does not return to therapy     Subjective:     Pain Ratin-3   Doing well overall, feels ready to try a longer time between sessions. Has been walking 2-4 miles per day, doing the silver sneaDpivisions at home and feels good. When his pain starts the stretching and therapy exercises seem to help.      Objective:     Exercise #1: self towel cervical extension x 10 reps  Comment #1: self towel cervical rotation SNAGS x 15 reps  Exercise #2: scap set rows x 10 reps  Comment #2: seated chin tucks - hold due to pain with this  Exercise #3: cervical isometrics 3\" hold x 5 reps flexion, extension and side bending bilaterally - added to HEP today  Comment #3: B shoulder abduction    Appt time: 7:02 AM  - 7:30 AM     Treatment Today   2021  TREATMENT MINUTES COMMENTS   Evaluation     Self-care/ Home management     Manual therapy 23 Supine cervical distraction   Supine STM and suboccipital release   Supine PAs to T1-2. C6-7 (C/T junction   Neuromuscular Re-education     Therapeutic Activity     Therapeutic Exercises 5 UBE F/B WL 4 x 5 min     Reviewed HEP as above    Gait training     Modality__________________                Total 28     Blank areas are intentional and mean the treatment did not include these items.       Evelyn Fabian, PT, DPT, CLT-TAMMY  2021  "

## 2021-06-16 NOTE — PROGRESS NOTES
[facility] Rehabilitation Daily Progress Note    Patient Name: Otis Brumfield  Date: 3/23/2021  Visit #: 6  PTA visit #:    Referral Diagnosis: Cervical spine pain  Referring provider: Terry Posadas MD  Visit Diagnosis:     ICD-10-CM    1. Cervicalgia  M54.2    2. Poor posture  R29.3    3. Generalized muscle weakness  M62.81        Assessment from Initial Evaluation:  Otis Brumfield is a 65 y.o. male who presents to therapy today with chief complaints of chronic neck pain. Onset date of sx was more than 10 years ago and has progressively increased this week.  Pt denies injury to the neck.  Pain symptoms are aching, stiffness with occasional numbness in the L arm.  Functional impairments include decrease cervical rotation and flexion/extnesion for ADLs, reading, home tasks.  Patient will benefit from skilled therapy to increase ROM, increase strength and improved overall functional mobility, ADLs and self cares. .   Impairments in  pain, posture, ROM, joint mobility, strength  The POC is dynamic and will be modified on an ongoing basis.  Barriers to achieving goals as noted in the assessment section may affect outcome.    Precautions / Restrictions : none      Assessment:   Doing well with the stretching, walking and HEP. Pain is better managed right now and benefits from manual therapy to decrease tension and pain and improve joint mobility. Will plan to continue 1 time per week to every other week for progression of strengthening as well as continuing with manual therapy to maximize recovery and function.      HEP/POC compliance is  good .  Patient demonstrates understanding/independence with home program.    Goal Status:  Pt. will be independent with home exercise program in : 6 weeks  Pt. will have improved quality of sleep: with less pain;waking less times/night;in 6 weeks  Pt. will improve posture : in sitting;in standing;in 6 weeks  Patient Turn Head: for driving;for watching traffic;for  "computer;with less pain;with less difficulty;in 6 weeks  Patient will look up / down: for drinking;for reading;for computer work;with less pain;with less difficulty;in 6 weeks    Plan / Patient Education:     Continue with initial plan of care.  Progress with home program as tolerated.  Plan for next visit:  continue with manual distraction vs trial for cervical traction, progress cervical and scapular strengthening if indicated   Subjective:     Pain Ratin-3   Yesterday the pain was a constant aching pain, had started the day with sitting and doing 6-8 cross word puzzles.   The shoulder pain he has last session improved and has not come back.   No headaches.   Has been walking 2-3 miles per day and doing online silver snickers 1-2 times per week.    Objective:     Exercise #1: self towel cervical extension x 10 reps  Comment #1: self towel cervical rotation SNAGS x 15 reps  Exercise #2: scap set rows x 10 reps  Comment #2: seated chin tucks - hold due to pain with this  Exercise #3: cervical isometrics 3\" hold x 5 reps flexion, extension and side bending bilaterally - added to HEP today  Comment #3: B shoulder abduction    Appt time: 7:02 AM  - 7:32 AM     Treatment Today   3/23/2021  TREATMENT MINUTES COMMENTS   Evaluation     Self-care/ Home management     Manual therapy 25 Supine cervical distraction   Supine STM and suboccipital release   Supine PAs to T1-2. C6-7 (C/T junction   Neuromuscular Re-education     Therapeutic Activity     Therapeutic Exercises 5 UBE F/B WL 4 x 5 min     Reviewed HEP as above    Gait training     Modality__________________                Total 30     Blank areas are intentional and mean the treatment did not include these items.       Evelyn Fabian, PT, DPT, CLT-TAMMY  3/23/2021  "

## 2021-06-18 NOTE — PROGRESS NOTES
Assessment/Plan:    1. Pain in joint of left shoulder  Left shoulder x-ray is negative for any dislocation or fractures. Evidence of mild degenerative and hypertrophic bony changes.  Discussed with patient that pain is likely related to osteoarthritis of the left shoulder joint.  I recommended physical therapy initially.  Referral for physical therapy placed today.  Patient may also take Tylenol for management of pain.  He is advised to notify clinic with any new or worsening symptoms.  He understands and is comfortable with this plan of care.  - XR Shoulder Left 2 or More VWS; Future  - Ambulatory referral to PT/OT    2. Essential hypertension  Hypertension remains stable on current regimen.  Will provide refill for lisinopril-hydrochlorothiazide 10-12 0.5 mg.  Patient should continue to take this medication as directed.  Notify clinic with any blood pressure concerns.  Will continue to monitor at future visits.  He is advised to schedule annual physical exam for lab work.  - lisinopril-hydrochlorothiazide (PRINZIDE,ZESTORETIC) 10-12.5 mg per tablet; TAKE 1 TABLET BY MOUTH ONCE DAILY  Dispense: 90 tablet; Refill: 3      Subjective:    Otis Brumfield is a 63 year old male seen today for evaluation of left shoulder pain.  Patient reports that he has been having more pain in his left shoulder on and off over the last 6 months or so.  No recent injury.  No history of previous injuries to the left shoulder.  Pain comes and goes. It is manageable, just more of a bother at this point in time. Usually finds that it is worse when raising his arm up or reaching for something but also has some discomfort at rest.  Other times has no pain at all.  Most of  the time the pain feels like a deep ache.  He denies any paresthesias.  Has full sensation, movement and strength in his left shoulder and arm.  No issues with his right shoulder since having rotator cuff surgery on it 2 years ago.  Right shoulder has been great ever  since.      Patient would like to have his lisinopril-hydrochlorothiazide refilled today.  Blood pressures have been stable on this regimen. He does not routinely check pressures outside of the office. No symptoms of hyper/hypotension. He does not have any additional concerns today. Review of systems is as stated in HPI, and the remainder of the 10 system review is otherwise unremarkable.    Past Medical History, Family History, and Social History reviewed.   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   1 older bro - ? DM   Surgeries: inguinal hernia repair ? left side; right rotator cuff surgery 2014 (Dr. Gabriele Abernathy)  Hospitalizations: none    Past Surgical History:   Procedure Laterality Date     HERNIA REPAIR Left      [place] REMOVAL OF SPERM DUCT(S)      Description: Surgery Of Male Genitalia Vasectomy;  Recorded: 05/24/2007;     [place] REMOVAL OF TONSILS,<11 Y/O      Description: Tonsillectomy;  Recorded: 05/24/2007;        Family History   Problem Relation Age of Onset     Colon cancer Father      Diabetes Mother      Diabetes Brother         Past Medical History:   Diagnosis Date     Hypertension         Social History   Substance Use Topics     Smoking status: Current Some Day Smoker     Types: Cigars     Smokeless tobacco: Not on file     Alcohol use 1.8 - 2.4 oz/week     3 - 4 Cans of beer per week        Current Outpatient Prescriptions   Medication Sig Dispense Refill     lisinopril-hydrochlorothiazide (PRINZIDE,ZESTORETIC) 10-12.5 mg per tablet TAKE 1 TABLET BY MOUTH ONCE DAILY 90 tablet 3     azithromycin (ZITHROMAX) 250 MG tablet Take 2 tabs on day one, and then 1 tab on days 2-5. 6 tablet 0     ibuprofen (ADVIL,MOTRIN) 200 MG tablet Take 200-400 mg by mouth  "every 8 (eight) hours as needed for pain.       No current facility-administered medications for this visit.           Objective:    Vitals:    05/23/18 1349   BP: 100/60   Patient Site: Right Arm   Patient Position: Sitting   Cuff Size: Adult Large   Pulse: 76   SpO2: 97%   Weight: 213 lb (96.6 kg)   Height: 5' 10\" (1.778 m)      Body mass index is 30.56 kg/(m^2).      General Appearance:  Alert, cooperative, no distress, appears stated age   Lungs:   Clear to auscultation bilaterally, respirations unlabored.    Heart:  Regular rate and rhythm, S1, S2 normal.   Extremities:  Shoulder with mild crepitus.  Range of motion, strength intact.  No pain with abduction, abduction or external rotation.  All other extremities normal.  No cyanosis or edema   Skin: Warm, dry.   Neurologic: Normal strength, sensation, reflexes throughout.         This note has been dictated using voice recognition software. Any grammatical or context distortions are unintentional and inherent to the use of this software.     "

## 2021-06-18 NOTE — PATIENT INSTRUCTIONS - HE
Patient Instructions by Evelyn Fabian PT at 2/5/2021  7:00 AM     Author: Evelyn Fabian PT Service: -- Author Type: Physical Therapist    Filed: 2/5/2021  7:30 AM Encounter Date: 2/5/2021 Status: Signed    : Evelyn Fabian PT (Physical Therapist)        ISOMETRIC FLEXION    Place your fingers on your forehead and gently push your head into your fingers.    ISOMETRIC EXTENSION    Place your fingers on the back of your head and gently draw your head back into your fingers.    ISOMETRIC SIDE BEND    Place your fingers on the side of your head and gently tilt your head to the side and into your fingers.

## 2021-06-18 NOTE — PATIENT INSTRUCTIONS - HE
Patient Instructions by Terry Posadas MD at 1/13/2021 12:40 PM     Author: Terry Posadas MD Service: -- Author Type: Physician    Filed: 1/13/2021  1:06 PM Encounter Date: 1/13/2021 Status: Signed    : Terry Posadas MD (Physician)         Patient Education   Understanding USDA MyPlate  The USDA (US Department of Agriculture) has guidelines to help you make healthy food choices. These are called MyPlate. MyPlate shows the food groups that make up healthy meals using the image of a place setting. Before you eat, think about the healthiest choices for what to put onto your plate or into your cup or bowl. To learn more about building a healthy plate, visit www.choosemyplate.gov.       The Food Groups    Fruits: Any fruit or 100% fruit juice counts as part of the Fruit Group. Fruits may be fresh, canned, frozen, or dried, and may be whole, cut-up, or pureed. Make half your plate fruits and vegetables.    Vegetables: Any vegetable or 100% vegetable juice counts as a member of the Vegetable Group. Vegetables may be fresh, frozen, canned, or dried. They can be served raw or cooked and may be whole, cut-up, or mashed. Make half your plate fruits and vegetables.     Grains: All foods made from grains are part of the Grains Group. These include wheat, rice, oats, cornmeal, and barley such as bread, pasta, oatmeal, cereal, tortillas, and grits. Grains should be no more than a quarter of your plate. At least half of your grains should be whole grains.    Protein: This group includes meat, poultry, seafood, beans and peas, eggs, processed soy products (like tofu), nuts (including nut butters), and seeds. Make protein choices no more than a quarter of your plate. Meat and poultry choices should be lean or low fat.    Dairy: All fluid milk products and foods made from milk that contain calcium, like yogurt and cheese are part of the Dairy Group. (Foods that have little calcium, such as cream, butter, and cream  cheese, are not part of the group.) Most dairy choices should be low-fat or fat-free.    Oils: These are fats that are liquid at room temperature. They include canola, corn, olive, soybean, and sunflower oil. Foods that are mainly oil include mayonnaise, certain salad dressings, and soft margarines. You should have only 5 to 7 teaspoons of oils a day. You probably already get this much from the food you eat.  Use [employer] to Help Build Your Meals  The [employer] can help you plan and track your meals and activity. You can look up individual foods to see or compare their nutritional value. You can get guidelines for what and how much you should eat. You can compare your food choices. And you can assess personal physical activities and see ways you can improve. Go to www.[employer] So[employer].gov/"[employer]cker/.    5648-1688 [employer]. [address] [place] [address]. All rights reserved. This information is not intended as a substitute for professional medical care. Always follow your healthcare professional's instructions.             Advance Directive  Patients advance directive was discussed and I am comfortable with the patients wishes.  Patient Education   Personalized Prevention Plan  You are due for the preventive services outlined below.  Your care team is available to assist you in scheduling these services.  If you have already completed any of these items, please share that information with your care team to update in your medical record.  Health Maintenance   Topic Date Due   ? HEPATITIS C SCREENING  1955   ? ZOSTER VACCINES (1 of 2) 04/10/2004   ? LIPID  04/18/2017   ? Pneumococcal Vaccine: 65+ Years (2 of 2 - PPSV23) 07/25/2020   ? INFLUENZA VACCINE RULE BASED (1) 08/01/2020   ? MEDICARE ANNUAL WELLNESS VISIT  01/13/2022   ? FALL RISK ASSESSMENT  01/13/2022   ? ADVANCE CARE PLANNING  01/13/2026   ? TD 18+ HE  06/06/2027   ? COLORECTAL CANCER SCREENING  08/08/2029   ?  Pneumococcal Vaccine: Pediatrics (0 to 5 Years) and At-Risk Patients (6 to 64 Years)  Aged Out   ? HEPATITIS B VACCINES  Aged Out

## 2021-06-18 NOTE — PROGRESS NOTES
Assessment/Plan:    1. Pneumonia  Chest x-ray obtained and personally reviewed in clinic.  This is not obvious for any infiltrate or consolidation.  I do suspect patient could have the beginning symptoms of pneumonia.  Based on severity and length of symptoms, will treat with azithromycin.  Patient instructed to take 2 tablets on day 1 followed by 1 tablet days 2 through 5.  Discussed symptomatic cares in addition to antibiotic.  Encouraged adequate rest and hydration.  Discussed signs and symptoms of worsening illness that would trigger prompt return to clinic.  He understands and is comfortable with this plan of care.  - XR Chest 2 Views  - azithromycin (ZITHROMAX Z-GRACE) 250 MG tablet; Take 2 tablets (500 mg) on  Day 1,  followed by 1 tablet (250 mg) once daily on Days 2 through 5.  Dispense: 6 tablet; Refill: 0      Subjective:    Otis Brumfield is a 63 year old male seen today for evaluation of a cough that he has had for approximately 2 weeks.  Patient went on a [place] cruise with his wife and a few friends.  He came down with symptoms of nasal congestion and cough.  Nasal congestion has since cleared for the most part.  He is not having any sinus pain or headaches.  Cough has remained very persistent.  He does not feel that it is necessarily getting worse every day but symptoms have not let up at all.  He feels as though he has had fevers off and on although he has not taken his temperature.  He gets very chilled and clammy at times.  Cough is nonproductive.  He feels as though he cannot cough up whatever it is that he needs to.  He is having a lot of chest congestion.  No significant shortness of breath.  If is also sick with similar symptoms.  Has more sinus congestion and drainage than he does.  Notes that his friend who is on the cruise was seen in urgent care and treated for pneumonia.  He does not have any additional concerns today.  Review of systems is as stated in HPI, and the remainder of the 10  "system review is otherwise unremarkable.    Past Medical History, Family History, and Social History reviewed.   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   1 older bro - ? DM   Surgeries: inguinal hernia repair ? left side; right rotator cuff surgery 2014 (Dr. Gabriele Abernathy)  Hospitalizations: none    Past Surgical History:   Procedure Laterality Date     HERNIA REPAIR Left      [place] REMOVAL OF SPERM DUCT(S)      Description: Surgery Of Male Genitalia Vasectomy;  Recorded: 05/24/2007;     [place] REMOVAL OF TONSILS,<13 Y/O      Description: Tonsillectomy;  Recorded: 05/24/2007;        Family History   Problem Relation Age of Onset     Colon cancer Father      Diabetes Mother      Diabetes Brother         Past Medical History:   Diagnosis Date     Hypertension         Social History   Substance Use Topics     Smoking status: Current Some Day Smoker     Types: Cigars     Smokeless tobacco: Not on file     Alcohol use 1.8 - 2.4 oz/week     3 - 4 Cans of beer per week        Current Outpatient Prescriptions   Medication Sig Dispense Refill     lisinopril-hydrochlorothiazide (PRINZIDE,ZESTORETIC) 10-12.5 mg per tablet TAKE 1 TABLET BY MOUTH ONCE DAILY 90 tablet 3     azithromycin (ZITHROMAX) 250 MG tablet Take 2 tabs on day one, and then 1 tab on days 2-5. 6 tablet 0     ibuprofen (ADVIL,MOTRIN) 200 MG tablet Take 200-400 mg by mouth every 8 (eight) hours as needed for pain.       No current facility-administered medications for this visit.           Objective:    Vitals:    06/25/18 1340   BP: 138/62   Patient Site: Right Arm   Patient Position: Sitting   Cuff Size: Adult Regular   Pulse: 77   Temp: 98.5  F (36.9  C)   SpO2: 96%   Weight: 211 lb 6.4 oz (95.9 kg)   Height: 5' 10\" " (1.778 m)      Body mass index is 30.33 kg/(m^2).      General Appearance:  Alert, cooperative, no distress, appears stated age   HEENT:   Oropharynx and nasopharynx clear.  No sinus tenderness on palpation.  TMs normal bilaterally.  Cough present    Neck: Supple, symmetrical, no adenopathy.   Lungs:    Somewhat diminished breath sounds in the right upper lobe, respirations unlabored.  No expiratory wheeze or inspiratory crackles noted.   Heart:  Regular rate and rhythm, S1, S2 normal, no murmur, rub or gallop   Skin: Warm, dry.  Skin color, texture, turgor normal.       This note has been dictated using voice recognition software. Any grammatical or context distortions are unintentional and inherent to the use of this software.

## 2021-06-18 NOTE — PATIENT INSTRUCTIONS - HE
Patient Instructions by Evelyn Fabian PT at 1/27/2021 10:00 AM     Author: Evelyn Fabian PT Service: -- Author Type: Physical Therapist    Filed: 1/27/2021 10:57 AM Encounter Date: 1/27/2021 Status: Signed    : Evelyn Fabian PT (Physical Therapist)        CERVICAL EXTENSION WITH TOWEL - CURVE OF NECK    Start with a small hand towel wrapped around the curve of your neck and holding the ends of the towel forward as shown. Next, extend your neck back over the towel as to look up at the ceiling. Then, return to starting position.     Your hands should remain still and holding the ends of the towel the entire time.       Upper Cervical Rotation Self Mobilization     With your arms crossed hold the towel firmly to your chest and the other hand has the towel pressed against your check bone. Pull the towel across your cheekbone with the towel doing the work and your neck feeling the stretch          RETRACTION / CHIN TUCK    Slowly draw your head back so that your ears line up with your shoulders.        ELASTIC BAND ROWS     Holding elastic band with both hands, draw back the band as you bend your elbows. Keep your elbows near the side of your body.      ELASTIC BAND BILATERAL EXTERNAL ROTATION    While holding an elastic band with your elbows bent, pull your hands away from your stomach area. Keep  your elbows near the side of your body.

## 2021-06-19 NOTE — PROGRESS NOTES
Assessment/Plan:    1. Sinusitis  In light of patient's ongoing symptoms and worsening nasal congestion/ sinus pain, will treat with doxycycline.  Patient is instructed to take 1 tablet by mouth 2 times a day for 10 days.  Encouraged adequate rest and hydration.  Discussed signs and symptoms of worsening illness to monitor for.  Patient should notify clinic if he experiences any of these.  He understands and is comfortable with this plan of care.  - doxycycline (VIBRA-TABS) 100 MG tablet; Take 1 tablet (100 mg total) by mouth 2 (two) times a day for 10 days.  Dispense: 20 tablet; Refill: 0      Subjective:    Otis Brumfield is a 63 year old male seen today for evaluation of ongoing cold symptoms.  Patient was seen in clinic roughly 2 weeks ago with concerns of productive cough.  Chest x-ray was negative at that time.  He was treated empirically with a Z-Speedy.  Today, patient reports that symptoms have persisted.  He was initially feeling a little bit better after finishing course of azithromycin.  However, over the last week symptoms have returned.  He still has a productive cough however frequency of cough has decreased.  He now feels that his symptoms have moved into his head causing nasal congestion.  He denies any significant headaches but does feel full in his sinuses.  Tried taking Mucinex without relief.  Felt that this actually made his symptoms worse.  He denies any shortness of breath or chest pain.  Has not had any fevers that he is aware of but does feel hot nad chilled most mornings.  Symptoms have lasted close to a month.  First developed cough on a  cruise.  Wife had similar symptoms but hers improved with Z-Speedy.  He is still eating and drinking normally.  Sleeping relatively well.  He does not smoke. No additional concerns today. Review of systems is as stated in HPI, and the remainder of the 10 system review is otherwise unremarkable.    Past Medical History, Family History, and Social  History reviewed.   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   1 older bro - ? DM   Surgeries: inguinal hernia repair ? left side; right rotator cuff surgery 2014 (Dr. Gabriele Abernathy)  Hospitalizations: none    Past Surgical History:   Procedure Laterality Date     HERNIA REPAIR Left      [place] REMOVAL OF SPERM DUCT(S)      Description: Surgery Of Male Genitalia Vasectomy;  Recorded: 05/24/2007;     [place] REMOVAL OF TONSILS,<11 Y/O      Description: Tonsillectomy;  Recorded: 05/24/2007;        Family History   Problem Relation Age of Onset     Colon cancer Father      Diabetes Mother      Diabetes Brother         Past Medical History:   Diagnosis Date     Hypertension         Social History   Substance Use Topics     Smoking status: Current Some Day Smoker     Types: Cigars     Smokeless tobacco: Not on file     Alcohol use 1.8 - 2.4 oz/week     3 - 4 Cans of beer per week        Current Outpatient Prescriptions   Medication Sig Dispense Refill     ibuprofen (ADVIL,MOTRIN) 200 MG tablet Take 200-400 mg by mouth every 8 (eight) hours as needed for pain.       lisinopril-hydrochlorothiazide (PRINZIDE,ZESTORETIC) 10-12.5 mg per tablet TAKE 1 TABLET BY MOUTH ONCE DAILY 90 tablet 3     azithromycin (ZITHROMAX) 250 MG tablet Take 2 tabs on day one, and then 1 tab on days 2-5. 6 tablet 0     doxycycline (VIBRA-TABS) 100 MG tablet Take 1 tablet (100 mg total) by mouth 2 (two) times a day for 10 days. 20 tablet 0     No current facility-administered medications for this visit.           Objective:    Vitals:    07/05/18 0730   BP: 122/82   Patient Site: Right Arm   Patient Position: Sitting   Cuff Size: Adult Regular   Pulse: 67   SpO2: 97%   Weight: 208 lb 11.2 oz (94.7 kg)   Height:  "5' 10\" (1.778 m)      Body mass index is 29.95 kg/(m^2).      General Appearance:  Alert, cooperative, no distress, appears stated age   HEENT:   Cough present. Oropharynx clear, nasopharynx with small amount of yellow-clear drainage.  TMs normal bilaterally.    Neck: Supple, symmetrical, no adenopathy.   Lungs:   Clear to auscultation bilaterally, respirations unlabored.  No expiratory wheeze or inspiratory crackles noted.   Heart:  Regular rate and rhythm, S1, S2 normal, no murmur, rub or gallop   Skin: Warm, dry.  Skin color, texture, turgor normal, no rashes or lesions       This note has been dictated using voice recognition software. Any grammatical or context distortions are unintentional and inherent to the use of this software.     "

## 2021-06-25 NOTE — TELEPHONE ENCOUNTER
Another round of an anti-viral will not provide assistance.  I encourage him to avoid using the steroid cream past two weeks as it can thin the skin.  Unfortunately, shingles affects a nerve which can cause pain for weeks/months.  We can certainly try a low dose gabapentin to see if this assists with the nerve discomfort.  Thanks.

## 2021-06-25 NOTE — TELEPHONE ENCOUNTER
Triage call. Patient calling.     Calling in regard to a visit he had 3 weeks ago. Diagnosed with shingles. Area is on his right side, sternum to shoulder blade. By the end of last week, the rash had been clearing up. He was out in the sun and heat a lot over the weekend. Now having a flare-up (burning sensation and muscle pain).    The pharmacist told him not to continue the cream past 2 weeks. Has enough cream, just wondering if he can continue it past the 2 weeks. Also wondering if he needs another round of the medication. He took the pain medication once but it caused a terrible headache so he hasn't taken it again.    Message to provider:    Having a flare-up of shingles and has the following questions:     Pharmacist told him not to use the triamcinolone cream past 2 weeks.  Is it ok to continue to use?    Does he need another round of valacyclovir? If re-ordered, please send to [facility] in [place]    Can the heat and sun cause a flare-up?    Patient can be reached at [phone] (ok to leave a message).     Savana Cobian RN 06/08/21 9:54 AM  [facility] Nurse Advisor    Reason for Disposition    Shingles rash already diagnosed and taking antiviral medication    Additional Information    Negative: Difficult to awaken or acting confused (e.g., disoriented, slurred speech)    Negative: Sounds like a life-threatening emergency to the triager    Negative: Shingles rash of face and eye pain or blurred vision    Negative: Shingles rash on the eyelid or tip of the nose    Negative: Shingles rash and spots start appearing other places on body    Negative: Patient sounds very sick or weak to the triager    Negative: Shingles rash (matches SYMPTOMS) and weak immune system (e.g., HIV positive,  cancer chemotherapy, chronic steroid treatment, splenectomy) and NOT taking antiviral medication    Negative: Shingles rash of face and facial weakness    Negative: Shingles rash of face or ear and earache or ringing  "in the ear    Negative: Fever > 100.4 F (38.0 C)    Negative: SEVERE pain (e.g., excruciating)    Negative: Shingles rash (matches SYMPTOMS) and onset within past 72 hours    Negative: Looks infected (spreading redness, pus) and no fever    Negative: Patient wants to be seen    Negative: Shingles rash and onset > 72 hours ago    Negative: Shingle rash already diagnosed and weak immune system (e.g., HIV positive,  cancer chemotherapy, chronic steroid treatment, splenectomy) and  taking antiviral medication    Negative: Pain lasting > 1 month after rash disappears    Protocols used: SHINGLES-A-OH    COVID 19 Nurse Triage Plan/Patient Instructions    Please be aware that novel coronavirus (COVID-19) may be circulating in the community. If you develop symptoms such as fever, cough, or SOB or if you have concerns about the presence of another infection including coronavirus (COVID-19), please contact your health care provider or visit  [email or web address].    Disposition/Instructions    Additional COVID19 information to add for patients.   How can I protect others?  If you have symptoms (fever, cough, body aches or trouble breathing): Stay home and away from others (self-isolate) until:    At least 10 days have passed since your symptoms started, And     You ve had no fever--and no medicine that reduces fever--for 1 full day (24 hours), And      Your other symptoms have resolved (gotten better).     If you don t have symptoms, but a test showed that you have COVID-19 (you tested positive):    Stay home and away from others (self-isolate). Follow the tips under \"How do I self-isolate?\" below for 10 days (20 days if you have a weak immune system).    You don't need to be retested for COVID-19 before going back to school or work. As long as you're fever-free and feeling better, you can go back to school, work and other activities after waiting the 10 or 20 days.     How do I self-isolate?    Stay in your own " room, even for meals. Use your own bathroom if you can.     Stay away from others in your home. No hugging, kissing or shaking hands. No visitors.    Don t go to work, school or anywhere else.     Clean  high touch  surfaces often (doorknobs, counters, handles, etc.). Use a household cleaning spray or wipes. You ll find a full list on the EPA website:  www.epa.gov/pesticide-registration/list-n-disinfectants-use-against-sars-cov-2.    Cover your mouth and nose with a mask, tissue or washcloth to avoid spreading germs.    Wash your hands and face often. Use soap and water.    Caregivers in these groups are at risk for severe illness due to COVID-19:  o People 65 years and older  o People who live in a nursing home or long-term care facility  o People with chronic disease (lung, heart, cancer, diabetes, kidney, liver, immunologic)  o People who have a weakened immune system, including those who:  - Are in cancer treatment  - Take medicine that weakens the immune system, such as corticosteroids  - Had a bone marrow or organ transplant  - Have an immune deficiency  - Have poorly controlled HIV or AIDS  - Are obese (body mass index of 40 or higher)  - Smoke regularly    Caregivers should wear gloves while washing dishes, handling laundry and cleaning bedrooms and bathrooms.    Use caution when washing and drying laundry: Don t shake dirty laundry, and use the warmest water setting that you can.    For more tips, go to www.cdc.gov/coronavirus/2019-ncov/downloads/10Things.pdf.    How can I take care of myself?  1. Get lots of rest. Drink extra fluids (unless a doctor has told you not to).     2. Take Tylenol (acetaminophen) for fever or pain. If you have liver or kidney problems, ask your family doctor if it s okay to take Tylenol.     Adults can take either:     650 mg (two 325 mg pills) every 4 to 6 hours, or     1,000 mg (two 500 mg pills) every 8 hours as needed.     Note: Don t take more than 3,000 mg in one day.    Acetaminophen is found in many medicines (both prescribed and over-the-counter medicines). Read all labels to be sure you don t take too much.     For children, check the Tylenol bottle for the right dose. The dose is based on the child s age or weight.    3. If you have other health problems (like cancer, heart failure, an organ transplant or severe kidney disease): Call your specialty clinic if you don t feel better in the next 2 days.    4. Know when to call 911: Emergency warning signs include:    Trouble breathing or shortness of breath    Pain or pressure in the chest that doesn t go away    Feeling confused like you haven t felt before, or not being able to wake up    Bluish-colored lips or face    What are the symptoms of COVID-19?     The most common symptoms are cough, fever and trouble breathing.     Less common symptoms include body aches, chills, diarrhea (loose, watery poops), fatigue (feeling very tired), headache, runny nose, sore throat and loss of smell.    COVID-19 can cause severe coughing (bronchitis) and lung infection (pneumonia).    How does it spread?     The virus may spread when a person coughs or sneezes into the air. The virus can travel about 6 feet this way, and it can live on surfaces.      Common  (household disinfectants) will kill the virus.    Who is at risk?  Anyone can catch COVID-19 if they re around someone who has the virus.    How can others protect themselves?     Stay away from people who have COVID-19 (or symptoms of COVID-19).    Wash hands often with soap and water. Or, use hand  with at least 60% alcohol.    Avoid touching the eyes, nose or mouth.     Wear a face mask when you go out in public, when sick or when caring for a sick person.    Where can I get more information?     [facility]: About COVID-19: [email or web address]    CDC: What to Do If You re Sick: www.cdc.gov/coronavirus/2019-ncov/about/steps-when-sick.html    CDC:  Ending Home Isolation: www.cdc.gov/coronavirus/2019-ncov/hcp/disposition-in-home-patients.html     CDC: Caring for Someone: www.cdc.gov/coronavirus/2019-ncov/if-you-are-sick/care-for-someone.html     [facility]: Interim Guidance for Hospital Discharge to Home: www.[facility]/diseases/coronavirus/hcp/hospdischarge.pdf    [facility] clinical trials (COVID-19 research studies): [email or web address]-clinical-trials     Below are the COVID-19 hotlines at the [place] [facility] ([facility]). Interpreters are available.   o For health questions: Call 664-392-8464 or 1-138.458.7814 (7 a.m. to 7 p.m.)  o For questions about schools and childcare: Call 683-306-3527 or 1-115.619.6803 (7 a.m. to 7 p.m.)              Thank you for taking steps to prevent the spread of this virus.  o Limit your contact with others.  o Wear a simple mask to cover your cough.  o Wash your hands well and often.    Resources    [facility]: About COVID-19: [email or web address]    CDC: What to Do If You're Sick: www.cdc.gov/coronavirus/2019-ncov/about/steps-when-sick.html    CDC: Ending Home Isolation: www.cdc.gov/coronavirus/2019-ncov/hcp/disposition-in-home-patients.html     CDC: Caring for Someone: www.cdc.gov/coronavirus/2019-ncov/if-you-are-sick/care-for-someone.html     [facility]: Interim Guidance for Hospital Discharge to Home: www.[facility]./diseases/coronavirus/hcp/hospdischarge.pdf    [facility] clinical trials (COVID-19 research studies): [email or web address]-clinical-trials     Below are the COVID-19 hotlines at the [place] [facility] ([facility]). Interpreters are available.   o For health questions: Call 686-253-8578 or 1-578.812.1103 (7 a.m. to 7 p.m.)  o For questions about schools and childcare: Call 782-355-0801 or 1-983.371.2569 (7 a.m. to 7 p.m.)

## 2021-06-25 NOTE — PROGRESS NOTES
Assessment and Plan:     1. Herpes zoster without complication  Discussed treatment options.  Will start valacyclovir for 7 days.  Educated on its indications and side effects.  I prescribed triamcinolone cream to use as needed for skin irritation.  He denies need for narcotics.  He would continue taking over-the-counter acetaminophen and ibuprofen as needed for pain.  - valACYclovir (VALTREX) 1000 MG tablet; Take 1 tablet (1,000 mg total) by mouth 3 (three) times a day for 7 days.  Dispense: 21 tablet; Refill: 0  - triamcinolone (KENALOG) 0.1 % cream; Apply to the affected area twice daily as needed  Dispense: 45 g; Refill: 1    2. Hypertension  This is suboptimal.  He continues lisinopril-hydrochlorothiazide as prescribed.    He is to follow-up with Dr. Posadas if symptoms persist or worsen.  He is content with the plan.      Subjective:     Otis is a 66 y.o. male presenting to the clinic for concerns for possible shingles.  Patient states Sunday, he developed soreness within his right upper back while lifting an object.  He then developed a burning sensation which wrapped around his chest.  He now has a rash within his right axillary region.  He noticed the rash develop on Tuesday.  No fever has been present.  He woke up this morning with a deep bruised sensation within his right scapular region.  He has been taking over-the-counter ibuprofen.  He does have a history of chickenpox in childhood.  Patient has hypertension and takes lisinopril-hydrochlorothiazide.    Review of Systems: A complete 14 point review of systems was obtained and is negative or as stated in the history of present illness.    Social History     Socioeconomic History     Marital status:      Spouse name: Not on file     Number of children: Not on file     Years of education: Not on file     Highest education level: Not on file   Occupational History     Not on file   Social Needs     Financial resource strain: Not on file     Food  insecurity     Worry: Not on file     Inability: Not on file     Transportation needs     Medical: Not on file     Non-medical: Not on file   Tobacco Use     Smoking status: Light Tobacco Smoker     Types: Cigars     Smokeless tobacco: Never Used     Tobacco comment: cigar occassionally    Substance and Sexual Activity     Alcohol use: Yes     Alcohol/week: 3.0 - 4.0 standard drinks     Types: 3 - 4 Cans of beer per week     Drug use: No     Sexual activity: Not on file   Lifestyle     Physical activity     Days per week: Not on file     Minutes per session: Not on file     Stress: Not on file   Relationships     Social connections     Talks on phone: Not on file     Gets together: Not on file     Attends Orthodox service: Not on file     Active member of club or organization: Not on file     Attends meetings of clubs or organizations: Not on file     Relationship status: Not on file     Intimate partner violence     Fear of current or ex partner: Not on file     Emotionally abused: Not on file     Physically abused: Not on file     Forced sexual activity: Not on file   Other Topics Concern     Not on file   Social History Narrative     Not on file       Active Ambulatory Problems     Diagnosis Date Noted     Benign Polyps Of The Large Intestine      Diverticulosis      Hypertension      Acrochordon      Acute Meniscal Tear      Joint Pain, Localized In The Right Shoulder      Pneumonia 06/25/2018     Hypertensive heart disease without heart failure 08/20/2019     Resolved Ambulatory Problems     Diagnosis Date Noted     Abdominal Pain In The Left Lower Belly (LLQ)      Normochromic, Normocytic Anemia      Cerumen Impaction      Hematuria      Otitis Externa      Nonsuppurative Otitis Media      Eustachian Tube Dysfunction Of The Right Ear      Plantar Fasciitis      Joint Pain, Localized In The Hip      Acute Suppurative Otitis Media Of The Right Ear      Joint Pain, Localized In The Knee      Past Medical  History:   Diagnosis Date     Hypertension        Family History   Problem Relation Age of Onset     Colon cancer Father      Diabetes Mother      Diabetes Brother        Objective:     /70 (Patient Site: Right Arm, Patient Position: Sitting, Cuff Size: Adult Large)   Pulse 80   Wt 217 lb 1.6 oz (98.5 kg)   BMI 31.83 kg/m      Patient is alert, in no obvious distress.   Skin: Warm, dry.  He has a cluster of small erythematous vesicular lesions within his right axilla.  He also has a small cluster within his right scapular region.  Lungs:  Clear to auscultation. Respirations even and unlabored.  No wheezing or rales noted.   Heart:  Regular rate and rhythm.  No murmurs, S3, S4, gallops, or rubs.

## 2021-06-25 NOTE — TELEPHONE ENCOUNTER
I sent Triamcinolone and Tramadol to the pharmacy.  He should take the Tramadol sparingly as this is addictive and habit-forming.  He should also avoid taking other sedatives with the Tramadol.  Thanks.

## 2021-06-25 NOTE — TELEPHONE ENCOUNTER
Reason contacted:  Medication question  Information relayed:    Spoke with patient who has used quite a bit of the triamcinolone cream due to a large area of shingles rash on his chest, axilla, and back.    Can you please resend the triamcinolone with instructions for him to use 7 grams two times a day (this is based on fingertip units for steroid creams)?     He will be leaving town this afternoon and is also requesting pain medication.   He states he declined this at the time of his visit on 5/27/2021 but now feels like he needs this.    Please send pain medication and updated prescription for triamcinolone cream to [facility] in [facility] if appropriate.     Per 5/27/2021 OV note:  1. Herpes zoster without complication  Discussed treatment options.  Will start valacyclovir for 7 days.  Educated on its indications and side effects.  I prescribed triamcinolone cream to use as needed for skin irritation.  He denies need for narcotics.  He would continue taking over-the-counter acetaminophen and ibuprofen as needed for pain.  - valACYclovir (VALTREX) 1000 MG tablet; Take 1 tablet (1,000 mg total) by mouth 3 (three) times a day for 7 days.  Dispense: 21 tablet; Refill: 0  - triamcinolone (KENALOG) 0.1 % cream; Apply to the affected area twice daily as needed  Dispense: 45 g; Refill: 1        Additional questions:  No  Further follow-up needed:  Yes  Okay to leave a detailed message:  Yes [phone]

## 2021-06-25 NOTE — TELEPHONE ENCOUNTER
I sent a prescription for Gabapentin to the pharmacy.  He should start with one tablet at bedtime for 3 days. Then, he can increase to one tablet twice daily for 3 days and then increase to one tablet three times daily.  This can cause drowsiness, so I recommend he take the lowest dose that helps (but he can take it up to three times daily, especially if it does not cause drowsiness).  Thanks.

## 2021-06-26 ENCOUNTER — HEALTH MAINTENANCE LETTER (OUTPATIENT)
Age: 66
End: 2021-06-26

## 2021-06-30 NOTE — PROGRESS NOTES
Progress Notes by Evelyn Fabian PT at 1/27/2021 10:00 AM     Author: Evelyn Fabian PT Service: -- Author Type: Physical Therapist    Filed: 1/27/2021  4:46 PM Encounter Date: 1/27/2021 Status: Attested    : Evelyn Fabian PT (Physical Therapist) Cosigner: Chantelle Mcmanus CNP at 1/28/2021 10:31 AM    Attestation signed by Chantelle Mcmanus CNP at 1/28/2021 10:31 AM    Follow therapist recommendations please                    [facility] Certification Request    January 27, 2021      Patient: Otis Brumfield  MR Number: [id number]  YOB: 1955  Date of Visit: 1/27/2021      Dear Dr. Anne, Keri Molina PA-C:    Thank you for this referral.   We are seeing Otis Brumfield in Physical Therapyfor neck pain .    Medicare and/or Medicaid requires physician review and approval of the treatment plan. Please review the plan of care and verify that you agree with the therapy plan of care by co-signing this note.      Plan of Care  Authorization / Certification Start Date: 01/27/21  Authorization / Certification End Date: 04/27/21  Communication with: Referral Source  Patient Related Instruction: Nature of Condition  Times per Week: 1-2  Number of Weeks: 10-12  Number of Visits: 10-12 sessions  Discharge Planning: when goals have been met or a plateau in progress has been achieved  Precautions / Restrictions : none  Therapeutic Exercise: ROM;Stretching;Strengthening  Neuromuscular Reeducation: kinesio tape;posture;postural restoration;core  Manual Therapy: soft tissue mobilization;myofascial release;joint mobilization  Modalities: traction;electrical stimulation;TENS;ultrasound;cold pack;hot pack  Equipment: theraband;home traction unit      Goals:  Pt. will be independent with home exercise program in : 6 weeks  Pt. will have improved quality of sleep: with less pain;waking less times/night;in 6 weeks  Pt. will improve posture : in sitting;in standing;in 6 weeks  Patient Turn  Head: for driving;for watching traffic;for computer;with less pain;with less difficulty;in 6 weeks  Patient will look up / down: for drinking;for reading;for computer work;with less pain;with less difficulty;in 6 weeks    No data recorded      If you have any questions or concerns, please don't hesitate to call.    Sincerely,      Evelyn Fabian, PT, DPT        Physician recommendation:     ___ Follow therapist's recommendation        ___ Modify therapy      *Physician co-signature indicates they certify the need for these services furnished within this plan and while under their care.    [facility]   Cervical Thoracic Initial Evaluation    Patient Name: Otis Brumfield  Date of evaluation: 1/27/2021  Referral Diagnosis: Cervical spine pain  Referring provider: Keri Anne PA-C  Visit Diagnosis:     ICD-10-CM    1. Cervicalgia  M54.2    2. Poor posture  R29.3    3. Generalized muscle weakness  M62.81        Assessment:      Otis Brumfield is a 65 y.o. male who presents to therapy today with chief complaints of chronic neck pain. Onset date of sx was more than 10 years ago and has progressively increased this week.  Pt denies injury to the neck.  Pain symptoms are aching, stiffness with occasional numbness in the L arm.  Functional impairments include decrease cervical rotation and flexion/extnesion for ADLs, reading, home tasks.  Patient will benefit from skilled therapy to increase ROM, increase strength and improved overall functional mobility, ADLs and self cares. .   Impairments in  pain, posture, ROM, joint mobility, strength  The POC is dynamic and will be modified on an ongoing basis.  Barriers to achieving goals as noted in the assessment section may affect outcome.  Prognosis to achieve goals is  good   Pt. is appropriate for skilled PT intervention as outlined in the Plan of Care (POC).  Pt. is a good candidate for skilled PT services to improve pain levels and  function.    Goals:  Pt. will be independent with home exercise program in : 6 weeks  Pt. will have improved quality of sleep: with less pain;waking less times/night;in 6 weeks  Pt. will improve posture : in sitting;in standing;in 6 weeks  Patient Turn Head: for driving;for watching traffic;for computer;with less pain;with less difficulty;in 6 weeks  Patient will look up / down: for drinking;for reading;for computer work;with less pain;with less difficulty;in 6 weeks    No data recorded    Patient's expectations/goals are realistic.    Barriers to Learning or Achieving Goals:  Chronicity of the problem.       Plan / Patient Instructions:        Plan of Care:   Authorization / Certification Start Date: 01/27/21  Authorization / Certification End Date: 04/27/21  Communication with: Referral Source  Patient Related Instruction: Nature of Condition  Times per Week: 1-2  Number of Weeks: 10-12  Number of Visits: 10-12 sessions  Discharge Planning: when goals have been met or a plateau in progress has been achieved  Precautions / Restrictions : none  Therapeutic Exercise: ROM;Stretching;Strengthening  Neuromuscular Reeducation: kinesio tape;posture;postural restoration;core  Manual Therapy: soft tissue mobilization;myofascial release;joint mobilization  Modalities: traction;electrical stimulation;TENS;ultrasound;cold pack;hot pack  Equipment: theraband;home traction unit      POC and pathology of condition were reviewed with patient.  Pt. is in agreement with the Plan of Care  A Home Exercise Program (HEP) was initiated today.  Pt. was instructed in exercises by PT and patient was given a handout with detailed instructions.  Treatment techniques, plan of care, and goals were discussed with the patient.  The patient agrees to the plan as outlined.  The plan of care is dynamic and will be modified on an ongoing basis.    Plan for next visit: manual distraction vs trial for cervical traction, progress cervical and scapular  strengthening      Subjective:          Patient reports the pain has been ongoing more than 10 years. Had an MRI in  and show degenerative changes, has another one scheduled for Friday.   Pain is more increased when sitting still and reaching. When he is more active there is less pain.   There is a slight headache or zinger than comes on intermittently more so when sitting still longer and does not last long and goes away on its own.   Occasionally there is some numbness in the left arm when he is laying on the left side. Never happens on the right arm.     Social information:   Living Situation:single family home       Snow shoveling, yard work    Occupation:retired   Work Status:NA   Equipment Available: None    Pain Ratin  Pain rating at best: 1  Pain rating at worst: 8  Pain description: aching, sharp, soreness and numbness in L arm       Functional limitations are described as occurring with:   bending  looking up, looking down and turning head  sitting .  sleeping    Patient reports benefit from:  rest  , movement or exercise          Objective:      Note: Items left blank indicates the item was not performed or not indicated at the time of the evaluation.    Patient Outcome Measures :    Neck Disability Score in %: 26     Scores range from 0-100%, where a score of 0% represents minimal pain and maximal function. The minmal clinically important difference is a score reduction of 10%.    Cervical Thoracic Examination  1. Cervicalgia     2. Poor posture     3. Generalized muscle weakness       Involved side: Bilateral  Posture Observation:      General sitting posture is  fair.  Cervical:  Mild forward head    Cervical ROM:  2021  Date: 21     *Indicate scale AROM AROM AROM   Cervical Flexion To chest - tight      Cervical Extension Min dec pain Pain R > L       Right Left Right Left Right Left   Cervical Sidebending Min dec pain R  Min dec tight R        Cervical Rotation Min dec tight L  Mod  dec pain R       Cervical Protraction WNL     Cervical Retraction Min dec tight      Thoracic Flexion      Thoracic Extension      Thoracic Sidebending         Thoracic Rotation           Strength   MMT in B UEs I WNL for all motions and pain free     Sensation   WNL to light touch in B UEs       UE Screen: WNL ROM and strengthen noted     Palpation:   Tenderness: T1-2. C6-7 hypomobile and tender to PAs in supine    Tightness: levator scap R > L, upper traps B     Passive Mobility-Joint Integrity: Hypomobile.    Cervical Special Tests     Cervical Special Tests Right Left UE Nerve Mobility Right Left   Cervical compression   Median nerve - -   Cervical distraction Feels good Feels good  Ulnar nerve - -   Spurlings test   Radial nerve - -   Shoulder abduction sign   Thoracic outlet     Deep neck flexor endurance test   Laisha     Upper cervical rotation   Adsons     Sharper-Grace   Cervical rotation lateral flexion     Alar ligament test   Other:     Other:   Other:         Treatment Today   1/27/2021  TREATMENT MINUTES COMMENTS   Evaluation 30  -Patient educated on pathology  -Discussed POC   Self-care/ Home management     Manual therapy 15 Supine cervical distraction   Supine STM and suboccipital release   Supine PAs to T1-2. C6-7 (C/T junction)    Neuromuscular Re-education     Therapeutic Activity     Therapeutic Exercises 10  -Demo/performance of HEP  Exercise #1: self towel cervical extension x 15 reps  Comment #1: self towel cervical rotation SNAGs x 15 reps  Exercise #2: seated chin tucks x 10 reps  Comment #2: scap set rows  Exercise #3: B shoulder ER with band  Comment #3: B shoulder abduction     Gait training     Modality__________________                Total 55    Blank areas are intentional and mean the treatment did not include these items.     PT Evaluation Code: (Please list factors)  Patient History/Comorbidities: as above   Examination: as above   Clinical Presentation: stable   Clinical Decision  Making: low     Patient History/  Comorbidities Examination  (body structures and functions, activity limitations, and/or participation restrictions) Clinical Presentation Clinical Decision Making (Complexity)   No documented Comorbidities or personal factors 1-2 Elements Stable and/or uncomplicated Low   1-2 documented comorbidities or personal factor 3 Elements Evolving clinical presentation with changing characteristics Moderate   3-4 documented comorbidities or personal factors 4 or more Unstable and unpredictable High     Evelyn Fabian PT, DPT, CLT- TAMMY   1/27/2021  10:04 AM

## 2021-07-06 VITALS
BODY MASS INDEX: 31.83 KG/M2 | SYSTOLIC BLOOD PRESSURE: 138 MMHG | WEIGHT: 217.1 LBS | HEART RATE: 80 BPM | DIASTOLIC BLOOD PRESSURE: 70 MMHG

## 2021-07-15 ENCOUNTER — TELEPHONE (OUTPATIENT)
Dept: FAMILY MEDICINE | Facility: CLINIC | Age: 66
End: 2021-07-15

## 2021-07-15 NOTE — TELEPHONE ENCOUNTER
Had apt with Annette BECKFORD 5-27-21 dx'd with shingles. rx'd valacyclovir and triamcinolone cream. Continues to have pain and burning sensation wonders if this is normal to be having continued symptoms after 8 weeks for if something else could be go on?

## 2021-07-18 NOTE — TELEPHONE ENCOUNTER
While not unheard of, I would suggest follow-up evaluation with a provider in office in order to reexamine and ensure not a result of a different etiology and determine further treatment options.

## 2021-07-19 NOTE — TELEPHONE ENCOUNTER
Spoke with patient, he is going out of town tomorrow. He states if it is still bothering him he will make an appointment for next week.

## 2021-08-05 ENCOUNTER — OFFICE VISIT (OUTPATIENT)
Dept: FAMILY MEDICINE | Facility: CLINIC | Age: 66
End: 2021-08-05
Payer: COMMERCIAL

## 2021-08-05 VITALS
OXYGEN SATURATION: 98 % | BODY MASS INDEX: 31.84 KG/M2 | SYSTOLIC BLOOD PRESSURE: 120 MMHG | WEIGHT: 217.2 LBS | HEART RATE: 65 BPM | DIASTOLIC BLOOD PRESSURE: 62 MMHG

## 2021-08-05 DIAGNOSIS — B02.29 NEURALGIA, POST-HERPETIC: Primary | ICD-10-CM

## 2021-08-05 PROCEDURE — 99214 OFFICE O/P EST MOD 30 MIN: CPT | Performed by: NURSE PRACTITIONER

## 2021-08-05 NOTE — PROGRESS NOTES
Assessment and Plan:     Neuralgia, post-herpetic  Differentials include neuralgia, myofascial pain, acute coronary syndrome, pneumonia, PE.  Symptoms developed while he had an episode of shingles and the discomfort has persisted.  He has skin sensitivity which is atypical for acute coronary syndrome or PE.  We did discuss that symptoms of postherpetic neuralgia can persist for months or a year after the rash resolves.  We discussed treatment options including gabapentin, Lyrica, antidepressants.  He declines any treatment options at this time as his symptoms are tolerable.  I encouraged close follow-up with Dr. Posadas if symptoms persist or worsen.  He is content with the plan.    30 minutes spent on the date of the encounter doing chart review, history and exam, documentation and further activities per the note        Subjective:     Otis is a 66 year old male presenting to the clinic for concerns for ongoing right scapular and chest pain.  Patient has a history of hypertensive heart disease without heart failure.  Patient was seen on 5/27/2021 where he was diagnosed with herpes zoster.  Rash was within his right scapular region and wrapped around to his right upper chest.  Patient was treated with valacyclovir and triamcinolone cream.  Pain worsened, so he is prescribed tramadol and gabapentin.  Patient states the tramadol and gabapentin caused him to experience headaches, so he did not take these medications for very long.  The rash has completely resolved, but he continues to experience an itching and burning sensation where the rash was present.  He states it is becoming more mild, but is constant.  Patient states his skin is sensitive to touch.  He denies shortness of breath, chest tightness, wheezing, cough.  He is walking 3 to 4 miles per day without difficulty.    Reviewof Systems: A complete 14 point review of systems was obtained and is negative or as stated in the history of present  illness.    Social History     Socioeconomic History     Marital status:      Spouse name: Not on file     Number of children: Not on file     Years of education: Not on file     Highest education level: Not on file   Occupational History     Not on file   Tobacco Use     Smoking status: Light Tobacco Smoker     Types: Cigars     Smokeless tobacco: Never Used     Tobacco comment: cigar occassionally   Substance and Sexual Activity     Alcohol use: Yes     Alcohol/week: 3.0 - 4.0 standard drinks     Drug use: No     Sexual activity: Not on file   Other Topics Concern     Not on file   Social History Narrative     Not on file     Social Determinants of Health     Financial Resource Strain:      Difficulty of Paying Living Expenses:    Food Insecurity:      Worried About Running Out of Food in the Last Year:      Ran Out of Food in the Last Year:    Transportation Needs:      Lack of Transportation (Medical):      Lack of Transportation (Non-Medical):    Physical Activity:      Days of Exercise per Week:      Minutes of Exercise per Session:    Stress:      Feeling of Stress :    Social Connections:      Frequency of Communication with Friends and Family:      Frequency of Social Gatherings with Friends and Family:      Attends Buddhism Services:      Active Member of Clubs or Organizations:      Attends Club or Organization Meetings:      Marital Status:    Intimate Partner Violence:      Fear of Current or Ex-Partner:      Emotionally Abused:      Physically Abused:      Sexually Abused:        Active Ambulatory Problems     Diagnosis Date Noted     Benign Polyps Of The Large Intestine      Diverticulosis      Hypertension      Acrochordon      Acute Meniscal Tear      Joint Pain, Localized In The Right Shoulder      Pneumonia 06/25/2018     Hypertensive heart disease without heart failure 08/20/2019     Resolved Ambulatory Problems     Diagnosis Date Noted     No Resolved Ambulatory Problems     Past Medical  History:   Diagnosis Date     Hypertension        Family History   Problem Relation Age of Onset     Colon Cancer Father      Diabetes Mother      Diabetes Brother        Objective:     /62 (BP Location: Right arm, Patient Position: Sitting, Cuff Size: Adult Large)   Pulse 65   Wt 98.5 kg (217 lb 3.2 oz)   SpO2 98%   BMI 31.84 kg/m      Patient is alert, in no obvious distress.   Skin: Warm, dry.    Lungs:  Clear to auscultation. Respirations even and unlabored.  No wheezing or rales noted.   Heart:  Regular rate and rhythm.  No murmurs, S3, S4, gallops, or rubs.    Musculoskeletal:  Full ROM of extremities.  No edema is present in bilateral lower extremities.  He is nontender to palpation of his right scapular, right axilla, and right upper chest.

## 2021-10-11 ENCOUNTER — HEALTH MAINTENANCE LETTER (OUTPATIENT)
Age: 66
End: 2021-10-11

## 2021-10-19 ENCOUNTER — IMMUNIZATION (OUTPATIENT)
Dept: NURSING | Facility: CLINIC | Age: 66
End: 2021-10-19
Payer: COMMERCIAL

## 2021-10-19 PROCEDURE — 91300 PR COVID VAC PFIZER DIL RECON 30 MCG/0.3 ML IM: CPT

## 2021-10-19 PROCEDURE — 0004A PR COVID VAC PFIZER DIL RECON 30 MCG/0.3 ML IM: CPT

## 2021-11-19 ENCOUNTER — TRANSFERRED RECORDS (OUTPATIENT)
Dept: HEALTH INFORMATION MANAGEMENT | Facility: CLINIC | Age: 66
End: 2021-11-19
Payer: COMMERCIAL

## 2022-03-27 ENCOUNTER — HEALTH MAINTENANCE LETTER (OUTPATIENT)
Age: 67
End: 2022-03-27

## 2022-05-03 ENCOUNTER — OFFICE VISIT (OUTPATIENT)
Dept: FAMILY MEDICINE | Facility: CLINIC | Age: 67
End: 2022-05-03
Payer: COMMERCIAL

## 2022-05-03 VITALS
HEIGHT: 69 IN | HEART RATE: 68 BPM | WEIGHT: 216.7 LBS | TEMPERATURE: 98 F | RESPIRATION RATE: 18 BRPM | OXYGEN SATURATION: 98 % | SYSTOLIC BLOOD PRESSURE: 122 MMHG | DIASTOLIC BLOOD PRESSURE: 80 MMHG | BODY MASS INDEX: 32.09 KG/M2

## 2022-05-03 DIAGNOSIS — B07.0 PLANTAR WARTS: Primary | ICD-10-CM

## 2022-05-03 DIAGNOSIS — Z23 HIGH PRIORITY FOR 2019-NCOV VACCINE: ICD-10-CM

## 2022-05-03 PROCEDURE — 90715 TDAP VACCINE 7 YRS/> IM: CPT | Performed by: NURSE PRACTITIONER

## 2022-05-03 PROCEDURE — 90471 IMMUNIZATION ADMIN: CPT | Performed by: NURSE PRACTITIONER

## 2022-05-03 PROCEDURE — 0054A COVID-19,PF,PFIZER (12+ YRS): CPT | Performed by: NURSE PRACTITIONER

## 2022-05-03 PROCEDURE — 91305 COVID-19,PF,PFIZER (12+ YRS): CPT | Performed by: NURSE PRACTITIONER

## 2022-05-03 PROCEDURE — 17110 DESTRUCTION B9 LES UP TO 14: CPT | Performed by: NURSE PRACTITIONER

## 2022-05-03 NOTE — PROGRESS NOTES
"  Assessment & Plan     Plantar warts  To plantar warts noted on ball of right foot.  Cryotherapy performed today.  Patient tolerated well.  He is advised to monitor the area and ensure no increased pain, redness, swelling.  Follow-up in roughly 3 weeks for second round of cryotherapy.    High priority for 2019-nCoV vaccine  - COVID-19,PF,PFIZER (12+ Yrs GRAY LABEL)         Tobacco Cessation:   reports that he has been smoking cigars. He has never used smokeless tobacco.    BMI:   Estimated body mass index is 31.77 kg/m  as calculated from the following:    Height as of this encounter: 1.759 m (5' 9.25\").    Weight as of this encounter: 98.3 kg (216 lb 11.2 oz).       No follow-ups on file.    NGHIA Rg CNP  [facility] is a 67 year old who presents for the following health issues: wart    HPI   Patient is here today with concerns of a plantar wart on the bottom of his right foot.  He states that he has had the wart off and on for several years.  About a year ago he started using over-the-counter treatments without any benefit.  He does not have pain per se but does notice some discomfort with weightbearing.  He has no additional concerns today.  He is requesting a second booster Pfizer COVID immunization.  He is also due for tetanus booster.        Answers for HPI/ROS submitted by the patient on 4/29/2022  How many servings of fruits and vegetables do you eat daily?: 2-3  On average, how many sweetened beverages do you drink each day (Examples: soda, juice, sweet tea, etc.  Do NOT count diet or artificially sweetened beverages)?: 0  How many minutes a day do you exercise enough to make your heart beat faster?: 20 to 29  How many days a week do you exercise enough to make your heart beat faster?: 4  How many days per week do you miss taking your medication?: 0  What is the reason for your visit today?: Plantars wart on right foot  When did your symptoms begin?: " "More than a month  What are your symptoms?: Discomfort  How would you describe these symptoms?: Mild  Are your symptoms:: Worsening  Have you had these symptoms before?: No  Is there anything that makes you feel worse?: No  Is there anything that makes you feel better?: No        Review of Systems   Review of Systems - pertinent positives noted in HPI, otherwise ROS is negative.        Objective    /80   Pulse 68   Temp 98  F (36.7  C) (Oral)   Resp 18   Ht 1.759 m (5' 9.25\")   Wt 98.3 kg (216 lb 11.2 oz)   SpO2 98%   BMI 31.77 kg/m    Body mass index is 31.77 kg/m .  Physical Exam     General Appearance:  Alert, cooperative, no distress, appears stated age   Extremities:  2 plantar warts noted on ball of right foot.  Some callusing noted.  Extremities are normal.  No cyanosis or edema   Skin: Warm, dry.  Skin color, texture, turgor normal, no rashes or lesions               "

## 2022-05-24 ENCOUNTER — OFFICE VISIT (OUTPATIENT)
Dept: FAMILY MEDICINE | Facility: CLINIC | Age: 67
End: 2022-05-24
Payer: COMMERCIAL

## 2022-05-24 VITALS
HEART RATE: 89 BPM | WEIGHT: 219 LBS | OXYGEN SATURATION: 98 % | DIASTOLIC BLOOD PRESSURE: 82 MMHG | SYSTOLIC BLOOD PRESSURE: 134 MMHG | BODY MASS INDEX: 32.11 KG/M2

## 2022-05-24 DIAGNOSIS — B07.0 PLANTAR WARTS: Primary | ICD-10-CM

## 2022-05-24 PROCEDURE — 99213 OFFICE O/P EST LOW 20 MIN: CPT | Performed by: NURSE PRACTITIONER

## 2022-05-24 NOTE — PROGRESS NOTES
"  Assessment & Plan     Plantar warts  Second round of cryotherapy completed today on plantar wart of right foot.  Patient tolerated this well.  He will return to clinic in 3 to 4 weeks to have it reassessed.  He is advised to notify us of any concerns in the meantime.         Tobacco Cessation:   reports that he has been smoking cigars. He has never used smokeless tobacco.      BMI:   Estimated body mass index is 32.11 kg/m  as calculated from the following:    Height as of 5/3/22: 1.759 m (5' 9.25\").    Weight as of this encounter: 99.3 kg (219 lb).       No follow-ups on file.    NGHIA Rg CNP  M [facility] CHRIS Monae is a 67 year old who presents for the following health issues: Follow-up wart treatment    HPI   Patient was here couple of weeks ago with initial concerns of plantar wart on the bottom of his right foot.  He had cryotherapy completed during his last appointment.  He notes some callusing but otherwise no issues or concerns since his last visit.  He is here today to have second round of cryotherapy completed.  He has no additional concerns at this time.      Review of Systems   Review of Systems - pertinent positives noted in HPI, otherwise ROS is negative.        Objective    /82   Pulse 89   Wt 99.3 kg (219 lb)   SpO2 98%   BMI 32.11 kg/m    Body mass index is 32.11 kg/m .  Physical Exam     General Appearance:  Alert, cooperative, no distress, appears stated age   Skin: Warm, dry.  Skin color, texture, turgor normal.  Plantar wart noted on ball of right foot with small plantar wart noted adjacent to it.  Some callusing noted around it.                 Answers for HPI/ROS submitted by the patient on 5/22/2022  What is the reason for your visit today? : Follow up  How many servings of fruits and vegetables do you eat daily?: 2-3  On average, how many sweetened beverages do you drink each day (Examples: soda, juice, sweet tea, etc.  Do NOT count diet " or artificially sweetened beverages)?: 0  How many minutes a day do you exercise enough to make your heart beat faster?: 10 to 19  How many days a week do you exercise enough to make your heart beat faster?: 4  How many days per week do you miss taking your medication?: 0

## 2022-06-06 DIAGNOSIS — I10 ESSENTIAL HYPERTENSION: ICD-10-CM

## 2022-06-07 RX ORDER — LISINOPRIL/HYDROCHLOROTHIAZIDE 10-12.5 MG
TABLET ORAL
Qty: 90 TABLET | Refills: 0 | Status: SHIPPED | OUTPATIENT
Start: 2022-06-07 | End: 2022-09-01

## 2022-06-07 NOTE — TELEPHONE ENCOUNTER
"Routing refill request to provider for review/approval because:  Labs not current:      Last Written Prescription Date:  3/14/22  Last Fill Quantity: 90,  # refills: 0   Last office visit provider:  5/24/22     Requested Prescriptions   Pending Prescriptions Disp Refills     lisinopril-hydrochlorothiazide (ZESTORETIC) 10-12.5 MG tablet [Pharmacy Med Name: Lisinopril-hydroCHLOROthiazide Oral Tablet 10-12.5 MG] 90 tablet 0     Sig: TAKE ONE TABLET BY MOUTH ONE TIME DAILY       Diuretics (Including Combos) Protocol Failed - 6/6/2022  7:15 AM        Failed - Normal serum creatinine on file in past 12 months     Recent Labs   Lab Test 01/13/21  1352   CR 1.07              Failed - Normal serum potassium on file in past 12 months     Recent Labs   Lab Test 01/13/21  1352   POTASSIUM 4.2                    Failed - Normal serum sodium on file in past 12 months     Recent Labs   Lab Test 01/13/21  1352                 Passed - Blood pressure under 140/90 in past 12 months     BP Readings from Last 3 Encounters:   05/24/22 134/82   05/03/22 122/80   08/05/21 120/62                 Passed - Recent (12 mo) or future (30 days) visit within the authorizing provider's specialty     Patient has had an office visit with the authorizing provider or a provider within the authorizing providers department within the previous 12 mos or has a future within next 30 days. See \"Patient Info\" tab in inbasket, or \"Choose Columns\" in Meds & Orders section of the refill encounter.              Passed - Medication is active on med list        Passed - Patient is age 18 or older       ACE Inhibitors (Including Combos) Protocol Failed - 6/6/2022  7:15 AM        Failed - Normal serum creatinine on file in past 12 months     Recent Labs   Lab Test 01/13/21  1352   CR 1.07       Ok to refill medication if creatinine is low          Failed - Normal serum potassium on file in past 12 months     Recent Labs   Lab Test 01/13/21  1352   POTASSIUM " "4.2             Passed - Blood pressure under 140/90 in past 12 months     BP Readings from Last 3 Encounters:   05/24/22 134/82   05/03/22 122/80   08/05/21 120/62                 Passed - Recent (12 mo) or future (30 days) visit within the authorizing provider's specialty     Patient has had an office visit with the authorizing provider or a provider within the authorizing providers department within the previous 12 mos or has a future within next 30 days. See \"Patient Info\" tab in inbasket, or \"Choose Columns\" in Meds & Orders section of the refill encounter.              Passed - Medication is active on med list        Passed - Patient is age 18 or older             Prachi Stapleton RN 06/06/22 9:53 PM  "

## 2022-06-14 ENCOUNTER — OFFICE VISIT (OUTPATIENT)
Dept: FAMILY MEDICINE | Facility: CLINIC | Age: 67
End: 2022-06-14
Payer: COMMERCIAL

## 2022-06-14 VITALS
BODY MASS INDEX: 32.78 KG/M2 | OXYGEN SATURATION: 98 % | RESPIRATION RATE: 18 BRPM | SYSTOLIC BLOOD PRESSURE: 130 MMHG | DIASTOLIC BLOOD PRESSURE: 80 MMHG | WEIGHT: 221.3 LBS | HEART RATE: 65 BPM | TEMPERATURE: 98 F | HEIGHT: 69 IN

## 2022-06-14 DIAGNOSIS — B07.0 PLANTAR WARTS: Primary | ICD-10-CM

## 2022-06-14 PROCEDURE — 17110 DESTRUCTION B9 LES UP TO 14: CPT | Performed by: NURSE PRACTITIONER

## 2022-06-14 NOTE — PATIENT INSTRUCTIONS
Plantar Warts  Warts are common skin growths that can appear anywhere on the body. Warts on the soles of the feet are called plantar warts. These warts are not a serious health problem. They usually go away without treatment. But plantar warts can be painful when you stand or walk. If this is the case, special cushions can help relieve pressure and pain. Drugstores carry these cushions and you can buy them without a prescription. If cushions don't work and the pain interferes with walking, the wart can be removed.  General care  Your healthcare provider may remove the plantar wart:  With prescription medicines. These may be placed directly on the wart at each office visit. Or you may be sent home with the medicine.  With a blade, or by freezing (cryotherapy), burning (electrocautery), or laser treatment.  You may be instructed to treat the wart yourself at home using an over-the-counter wart-removal medicine (such as 40% salicylic acid). Apply the medicine to the wart every day as directed. Don't apply the medicine to the healthy skin around the wart. In between applications, remove the dead wart tissue using the type of file suggested by your healthcare provider. You will likely need to repeat this process for several weeks to remove the entire wart.  Warts can spread from your foot to other parts of your body and to other people. Don't scratch or pick at the wart. Wash your hands well before and after touching your warts. If your child has warts, be certain to teach him or her proper hand washing techniques as well.  While many home remedies are suggested for warts, it's best to check with your healthcare provider before trying them.  Warts often come back, even after successful treatment. Return promptly for treatment of any new warts.  Follow-up care  Follow up with your healthcare provider, or as advised.  When to seek medical advice  Signs of infection (red streaks, pus, smelly or colored discharge, or  fever) appear  You have heavy bleeding or bleeding that won t stop with light pressure  The wart doesn t go away after several weeks of self-care  New warts appear on feet, hands, or face  [employer] last reviewed this educational content on 5/1/2018 2000-2021 [employer]. All rights reserved. This information is not intended as a substitute for professional medical care. Always follow your healthcare professional's instructions.

## 2022-06-14 NOTE — PROGRESS NOTES
"  Assessment & Plan     Plantar warts  Third round of cryotherapy completed on plantar warts today.  Patient tolerated well.  He is advised to monitor for any new pain or other symptoms otherwise follow-up in roughly 3 weeks to determine if further treatment is indicated.    Tobacco Cessation:   reports that he has been smoking cigars. He has never used smokeless tobacco.      BMI:   Estimated body mass index is 32.44 kg/m  as calculated from the following:    Height as of this encounter: 1.759 m (5' 9.25\").    Weight as of this encounter: 100.4 kg (221 lb 4.8 oz).       No follow-ups on file.    NGHIA Rg CNP  [facility] CHRIS Monae is a 67 year old who presents for the following health issues: Follow-up plantar wart    HPI   Patient is here today for wart treatment.  He has 2 plantar warts noted on the ball of his right foot.  He has had 2 prior cryotherapy sessions here within the last several weeks.  Has tolerated these sessions well.  Continues to have some discomfort from the wart but denies any worsening of pain or new issues since his last visit.    Review of Systems   Review of Systems - pertinent positives noted in HPI, otherwise ROS is negative.        Objective    /80   Pulse 65   Temp 98  F (36.7  C)   Resp 18   Ht 1.759 m (5' 9.25\")   Wt 100.4 kg (221 lb 4.8 oz)   SpO2 98%   BMI 32.44 kg/m    Body mass index is 32.44 kg/m .  Physical Exam     General Appearance:  Alert, cooperative, no distress, appears stated age   Skin: Warm, dry.  Skin color, texture, turgor normal.  2 plantar warts noted on ball of right foot.  Some callusing noted around the warts.                   Answers for HPI/ROS submitted by the patient on 6/14/2022  What is the reason for your visit today? : Follow up      "

## 2022-07-07 ENCOUNTER — OFFICE VISIT (OUTPATIENT)
Dept: FAMILY MEDICINE | Facility: CLINIC | Age: 67
End: 2022-07-07
Payer: COMMERCIAL

## 2022-07-07 VITALS
OXYGEN SATURATION: 96 % | WEIGHT: 219 LBS | HEART RATE: 70 BPM | SYSTOLIC BLOOD PRESSURE: 116 MMHG | DIASTOLIC BLOOD PRESSURE: 82 MMHG | BODY MASS INDEX: 32.11 KG/M2

## 2022-07-07 DIAGNOSIS — B07.0 PLANTAR WARTS: Primary | ICD-10-CM

## 2022-07-07 PROCEDURE — 17110 DESTRUCTION B9 LES UP TO 14: CPT | Performed by: FAMILY MEDICINE

## 2022-07-07 NOTE — PROGRESS NOTES
"  Assessment & Plan     Plantar warts  I did treat the large plantar wart on his left foot with liquid nitrogen freeze dry cycle x3 in the event it does not go away we will have him see podiatry  - Orthopedic  Referral               Tobacco Cessation:   reports that he has been smoking cigars. He has never used smokeless tobacco.      BMI:   Estimated body mass index is 32.11 kg/m  as calculated from the following:    Height as of 6/14/22: 1.759 m (5' 9.25\").    Weight as of this encounter: 99.3 kg (219 lb).           No follow-ups on file.    Lorne العراقي MD  [facility] CHRIS Monae is a 67 year old, presenting for the following health issues:  Plantar Wart (Right foot)      HPI   Patient has a recurrent plantar wart on his left foot which has been treated x3.  He presents today with persistence of a 1 cm plantar wart.  I treated it with liquid Freon applications x3 with freeze dry cycles of 10 to 15 seconds each.  If this fails to resolve I have already placed a podiatry referral for perhaps dissection and laser therapy of this recalcitrant wart we will wait 1 month        Review of Systems   Constitutional, HEENT, cardiovascular, pulmonary, gi and gu systems are negative, except as otherwise noted.      Objective    /82   Pulse 70   Wt 99.3 kg (219 lb)   SpO2 96%   BMI 32.11 kg/m    Body mass index is 32.11 kg/m .  Physical Exam   GENERAL: healthy, alert and no distress  NECK: no adenopathy, no asymmetry, masses, or scars and thyroid normal to palpation  RESP: lungs clear to auscultation - no rales, rhonchi or wheezes  CV: regular rate and rhythm, normal S1 S2, no S3 or S4, no murmur, click or rub, no peripheral edema and peripheral pulses strong  ABDOMEN: soft, nontender, no hepatosplenomegaly, no masses and bowel sounds normal  MS: no gross musculoskeletal defects noted, no edema    Extremity-1 cm plantar wart left foot base            .  ..  "

## 2022-08-09 ENCOUNTER — OFFICE VISIT (OUTPATIENT)
Dept: PODIATRY | Facility: CLINIC | Age: 67
End: 2022-08-09
Attending: FAMILY MEDICINE
Payer: COMMERCIAL

## 2022-08-09 VITALS — BODY MASS INDEX: 30.78 KG/M2 | WEIGHT: 215 LBS | OXYGEN SATURATION: 99 % | HEIGHT: 70 IN | HEART RATE: 92 BPM

## 2022-08-09 DIAGNOSIS — M21.6X1 PLANTAR FLEXED METATARSAL BONE OF RIGHT FOOT: ICD-10-CM

## 2022-08-09 DIAGNOSIS — M72.2 PLANTAR FASCIITIS: Primary | ICD-10-CM

## 2022-08-09 DIAGNOSIS — B07.0 PLANTAR WARTS: ICD-10-CM

## 2022-08-09 PROCEDURE — 99203 OFFICE O/P NEW LOW 30 MIN: CPT | Mod: 25 | Performed by: PODIATRIST

## 2022-08-09 PROCEDURE — 20550 NJX 1 TENDON SHEATH/LIGAMENT: CPT | Performed by: PODIATRIST

## 2022-08-09 RX ORDER — DEXAMETHASONE SODIUM PHOSPHATE 4 MG/ML
4 INJECTION, SOLUTION INTRA-ARTICULAR; INTRALESIONAL; INTRAMUSCULAR; INTRAVENOUS; SOFT TISSUE ONCE
Status: COMPLETED | OUTPATIENT
Start: 2022-08-09 | End: 2022-08-09

## 2022-08-09 RX ORDER — LIDOCAINE HYDROCHLORIDE 20 MG/ML
1 INJECTION, SOLUTION INFILTRATION; PERINEURAL ONCE
Status: COMPLETED | OUTPATIENT
Start: 2022-08-09 | End: 2022-08-09

## 2022-08-09 RX ADMIN — LIDOCAINE HYDROCHLORIDE 1 ML: 20 INJECTION, SOLUTION INFILTRATION; PERINEURAL at 08:24

## 2022-08-09 RX ADMIN — DEXAMETHASONE SODIUM PHOSPHATE 4 MG: 4 INJECTION, SOLUTION INTRA-ARTICULAR; INTRALESIONAL; INTRAMUSCULAR; INTRAVENOUS; SOFT TISSUE at 08:25

## 2022-08-09 ASSESSMENT — PAIN SCALES - GENERAL: PAINLEVEL: MILD PAIN (2)

## 2022-08-09 NOTE — PATIENT INSTRUCTIONS
What are Prescription Custom Orthotics?  Custom orthotics are specially-made devices designed to support and comfort your feet. Prescription orthotics are crafted for you and no one else. They match the contours of your feet precisely and are designed for the way you move. Orthotics are only manufactured after a podiatrist has conducted a complete evaluation of your feet, ankles, and legs, so the orthotic can accommodate your unique foot structure and pathology.  Prescription orthotics are divided into two categories:  Functional orthotics are designed to control abnormal motion. They may be used to treat foot pain caused by abnormal motion; they can also be used to treat injuries such as shin splints or tendinitis. Functional orthotics are usually crafted of a semi-rigid material such as plastic or graphite.  Accommodative orthotics are softer and meant to provide additional cushioning and support. They can be used to treat diabetic foot ulcers, painful calluses on the bottom of the foot, and other uncomfortable conditions.  Podiatrists use orthotics to treat foot problems such as plantar fasciitis, bursitis, tendinitis, diabetic foot ulcers, and foot, ankle, and heel pain. Clinical research studies have shown that podiatrist-prescribed foot orthotics decrease foot pain and improve function.  Orthotics typically cost more than shoe inserts purchased in a retail store, but the additional cost is usually well worth it. Unlike shoe inserts, orthotics are molded to fit each individual foot, so you can be sure that your orthotics fit and do what they're supposed to do. Prescription orthotics are also made of top-notch materials and last many years when cared for properly. Insurance often helps pay for prescription orthotics.  What are Shoe Inserts?   You've seen them at the grocery store and at the mall. You've probably even seen them on TV and online. Shoe inserts are any kind of non-prescription foot support designed  to be worn inside a shoe. Pre-packaged, mass produced, arch supports are shoe inserts. So are the  custom-made  insoles and foot supports that you can order online or at retail stores. Unless the device has been prescribed by a doctor and crafted for your specific foot, it's a shoe insert, not a custom orthotic device--despite what the ads might say.  Shoe inserts can be very helpful for a variety of foot ailments, including flat arches and foot and leg pain. They can cushion your feet, provide comfort, and support your arches, but they can't correct biomechanical foot problems or cure long-standing foot issues.  The most common types of shoe inserts are:  Arch supports: Some people have high arches. Others have low arches or flat feet. Arch supports generally have a  bumped-up  appearance and are designed to support the foot's natural arch.   Insoles: Insoles slip into your shoe to provide extra cushioning and support. Insoles are often made of gel, foam, or plastic.   Heel liners: Heel liners, sometimes called heel pads or heel cups, provide extra cushioning in the heel region. They may be especially useful for patients who have foot pain caused by age-related thinning of the heels' natural fat pads.   Foot cushions: Do your shoes rub against your heel or your toes? Foot cushions come in many different shapes and sizes and can be used as a barrier between you and your shoe.  Choosing an Over-the-Counter Shoe Insert  Selecting a shoe insert from the wide variety of devices on the market can be overwhelming. Here are some podiatrist-tested tips to help you find the insert that best meets your needs:  Consider your health. Do you have diabetes? Problems with circulation? An over-the-counter insert may not be your best bet. Diabetes and poor circulation increase your risk of foot ulcers and infections, so schedule an appointment with a podiatrist. He or she can help you select a solution that won't cause additional  health problems.   Think about the purpose. Are you planning to run a marathon, or do you just need a little arch support in your work shoes? Look for a product that fits your planned level of activity.   Bring your shoes. For the insert to be effective, it has to fit into your shoes. So bring your sneakers, dress shoes, or work boots--whatever you plan to wear with your insert. Look for an insert that will fit the contours of your shoe.   Try them on. If all possible, slip the insert into your shoe and try it out. Walk around a little. How does it feel? Don't assume that feelings of pressure will go away with continued wear. (If you can't try the inserts at the store, ask about the store's return policy and hold on to your receipt.)    Please call one of the [facility] locations below to schedule an appointment. If you received a prescription please bring it with you to your appointment. Some locations are limited to what they carry.    Office Locations    [facility]  [address] [place] [address]  Home Medical Equipment, [address]   Phone: [phone]   Orthotics and Prosthetics, [address]   Phone: [phone]    [facility]  [address]  [address]   [place] [address]   Phone: [phone]    [facility]  [address]  [place] [address]  Phone: [phone]    [facility]   [address]  [place] [address]  Phone: [phone]    [facility]  [address]  [place] [address]  Phone: [phone]    [facility]  [address]  [place] [address]  Phone: [phone]    [place]   [address] [place] [address]   Phone: [phone]    WEARING YOUR CUSTOM FOOT ORTHOTICS   Most  insurance plans cover one pair of orthotics per year. You must check with your   insurance plan to see what your payment responsibility will be. Please call your   insurance company by calling the number on the back of your insurance card.   Orthotic's are non-refundable and non-returnable.   Orthotics are made of various designs. Some orthotics are covered with material that extends beyond your toes. If your orthotic is of this design, you will likely need to trim the toe end to get a proper fit. The insole from your shoe can be used as a template. Simply overlay the shoe insert on top of the custom orthotic. Align the heel end while tracing the length of the insert onto the custom orthotic. Use a large scissor to trim the toe end until you get a proper fit in the shoe.   The orthotic needs to be pushed as far back in the shoe as possible. The heel portion should not ride forward so as not to irritate your heel.   Orthotics are designed to work with socks. Excessive perspiration will shorten the life span of the orthotics. Remove the orthotic from the shoe frequently for proper drying.   The break-in period lasts for weeks. People new to orthotics will likely experience new aches and pains. The orthotic is forcing your foot into a new position. Arch, foot and leg muscle aches and fatigue are common during these weeks. Minor discomfort can be considered normal break in phenomenon. Start wearing your orthotic around your home your first day. Limited activity for one to two hours is recommended. You can increase one or two additional hours each day provided the aches and pains are subsiding. The degree of discomfort, fatigue and problems will dictate the speed of break in. You may require multiple weeks to work up to full time use.   Do not continue wearing your orthotics if they are creating problems such as blisters or sores. Do not hesitate to call the clinic to speak with a nurse regarding orthotic   break in,  fit, trimming, etc. You may also need to see the doctor if the orthotics are   simply not working out. Adjustments are sometimes made to improve orthotic   function.     Orthotics will only work in certain styles and types of shoes. Orthotics rarely work in dress shoes. Slip-ons, clogs, sandals and heels are particularly troublesome. Specially designed orthotics may be necessary for these types of shoes. Your custom orthotic was designed for activities that require appropriate walking or running shoes. Lace up athletic shoes, walking shoes or work boots should work appropriately. You may need a wider or longer shoe. Shoes with a removable  or insert work best. In general, you want to remove an insert from the shoe before placing the orthotic into the shoe. Shoes without a removable liner may not work as well.     When purchasing new shoes, bring your orthotics along to get a proper fit. Shop at stores that are familiar with orthotics.   Frequent washing of the orthotic may shorten the life span of the top cover. The top cover can be replaced but will generally last one to five years depending on use and foot perspiration.         [place],      Your surgery with [facility] has been scheduled. Please read thoroughly and follow instructions.     Procedure:    CO2 laser ablation of the wart on the plantar lateral aspect of the right foot.    Procedure Date :   TBD  *A surgery nurse will call you 1-2 days before surgery to inform you of the time of arrival for surgery.    Surgeon:   MD Rafita Chaves    Admission Type:   Outpatient    Procedure Location:   [facility]:  [address] [place] [address] [address] [place] [address] (Fax: [phone])    Covid Test: TBD      Preparation Instructions to complete:    []  You will need a Pre-op Physical within 30 days before surgery with your primary care provider. This exam is required by the anesthesiologist to ensure a safe surgical  "experience.    Failure  to obtain your pre-op physical will lead to cancellation of your surgery  Call them right away to schedule this. Please ensure your Preoperative Physical is faxed to the Hospital (numbers listed above)    [] Preoperative Medication Instructions  We would like you to stop your anticoagulation medications 3-5 days before surgery HOWEVER contact your prescribing provider for instructions before discontinuing any medications. (Examples: Coumadin, Plavix, Xarelto, Eliquis, Pradaxa, Effient, Brilinta) If you are on Coumadin, we would like the goal INR ? 1.2.  IT IS OK TO STAY ON ASPIRIN PRIOR TO SURGERY.   Hold Ibuprofen, Herbal Supplements and Vitamin E 7 days before  Stop Cialis, Levitra and Viagra 2-3 days before surgery    [] Fasting Requirements  Nothing to eat for 8 hours before surgery unless instructed differently by the surgery nurse.  You may have clear liquids up to two hours before your arrival time (coffee, tea, water, or Gatorade. No cream or milk)  If your primary care provider has instructed you to continue oral medications, you may take them on the morning of surgery with a small sip of water.    No alcohol or smoking after 12:00am the day of surgery    [] PCR COVID-19 test is required within 4 days of surgery  If your test is positive or you fail to get tested, your surgery will be postponed.     [] Contact your insurance regarding coverage  If you would like a Good [place] Estimate for your upcoming procedure at [facility], contact Cost of Care Estimates   Advocates are available Monday through Friday 8am - 5pm   [phone]  You may also submit a request online at [email or web address]  - Complete the secure online form found under \"Services and Procedure Pricing\"     For all self-pay, estimate, or anesthesia billing questions at [facility], the contact information is below:    Who to contact: Kisha KC  [facility] " number: [phone]  Prepay number: [phone]    [] DO NOT BRING FMLA WITH TO SURGERY.  These should be sent to the provider's office by fax to [phone].    [] Day of Surgery  Medications - Take as indicated with sips of water.   Wear comfortable loose fitting clothes. Wear your glasses-Not contacts. Do not wear jewelry and remove body piercing's. Surgery may be cancelled if they are not removed.   You may have 1 family member wait in the lobby during your surgery. Visitor restrictions are subject to change. Please verify with the surgery nurse when they call.   If same day surgery-Have a someone come with you to surgery that can help you understand the surgeon's instructions, drive you home and stay with you overnight the first night.    [] If the community sees a new COVID-19 surge, your procedure may need to be postponed. We will contact you if this happens.    [] You will receive a call from a surgery nurse 1-3 days prior to surgery. They will go over more details with you.       Before Your Procedure or Hospital Admission Testing for COVID-19  Thank you for choosing [facility] for your health care needs. The COVID-19 pandemic is a very challenging time for everyone.   Our goal is to keep you and our team here at [facility] safe and healthy. We ve taken many steps to make this happen.   For example:   We test and screen our staff, care teams and patients for COVID-19.   Everyone at [facility] must wear a mask and stay 6 feet apart.   We are limiting hospital and clinic visitors.  Before you come in  You must get tested for COVID-19, even if you ve been vaccinated.   If you re going home on the same day as your procedure (surgery):  1 to 2 days before your procedure, take an at-home, rapid antigen test. You can buy these at many pharmacy stores. Or you can order free, at-home tests at covid.gov/tests. If you   can t find an at-home, rapid antigen test, please schedule a PCR test with  [facility] by calling [phone], or visiting [email or web address]. We   can t accept tests that are more than 4 days old.   If your test is negative, please take a photo of the test. Show the photo to the nurse when you come in for your procedure.  If your test is positive, please see the  If your test shows you have COVID-19  section on this page.    If you re staying overnight in the hospital:  4 days before your procedure, please get a PCR test from a lab.   To schedule a PCR test with [facility], call [phone]. Or, visit [email or web address].    If your test is negative, please ask the testing location to fax your results to us at [phone].  If your test is positive, please see the  If your test shows you have COVID-19  section below.    After your test and before your procedure, please follow these safety guidelines:   Limit trips outside your home.   Limit the number of people you see.   Always wear a face covering outside your home.   Use social distancing. Stay 6 feet away from others whenever you can.   Wash your hands often.    If your test shows you have COVID-19  If your test is positive, please tell your surgeon s office right away. A positive test means that you have COVID-19.  We ll probably have to postpone your admission, surgery or procedure. Your care team will discuss this with you. After that, we ll let you know what to   do and when you can re-schedule.      If your test shows you DON T have COVID-19.   Even if your test is negative, you can still get COVID-19. It s rare, but sometimes the test result is wrong. You could also catch the virus after taking   the test. There s a very small chance that you could catch COVID-19 in the hospital or surgery center. [facility] has taken many steps to prevent this from happening.   Possible surgery delay like you, we want your surgery to happen when it s scheduled. But sometimes the  hospital is so full that it s not safe for you to have your surgery. This is   especially true during the pandemic. Your surgery may need to be re-scheduled at a later date. If this happens, we will call and tell you.   Day of your surgery or procedure   Please come wearing a face covering that covers both your nose and mouth.   When you arrive, we ll ask you some questions to find out if you ve had any exposures to COVID-19, or have any signs of COVID-19.   No matter where you took a test, we ll need to have the results. You can ask your testing location to fax the results to us at [phone]. If you   took a rapid antigen at-home test, you can bring a photo of the results.    Ask your care team if you can have visitors. All visitors must wear face coverings and will be screened for exposure to, or signs of, COVID-19.    The rules for visitors change often, depending on how much the virus is spreading. To learn more, see Visiting a Loved One in the Hospital during   the COVID-19 Outbreak.Please call your care team, hospital or surgery center if you have any questions. We thank you for your understanding and for choosing [facility]   for your care.   Questions and answers  Does it matter how I get tested for COVID-19?   Yes. If the plan is for you to go home on the same day as your procedure, you can take a rapid antigen, at-home test 1 to 2 days before you come in. If your test is negative, please take a photo of your test. Show it to the nurse when you come to the hospital. If you can t find a rapid antigen, at-home test, you can take a PCR test at a lab instead. If the plan is for you to stay in the hospital after your surgery, you ll need to get a PCR test from a lab 4 days before your procedure. Ask the testing location to fax your results to [phone]. If we don t get your results, we may have to delay or cancel your treatment.  Do I need to get tested at [facility]?  No. However, if you  need a PCR test from a lab, we recommend using an [facility] lab. We ll get the results more quickly and easily that way. To schedule a test, please call [phone]. Or, visit [email or web address]      For informational purposes only. Not to replace the advice of your health care provider. Copyright   2020 [facility]. All rights reserved. Clinically reviewed by Infection Prevention and the [facility] COVID-19 Clinical Team.   Cole Martin 980211 - Rev 05/26/22.          Frequently Asked Questions    WHAT DO I DO WHEN I COME HOME FROM SURGERY?    After surgery and/ or your hospital stay you may be tired and drowsy. Rest, but make sure to get up and walk around every couple of hours to circulate your blood. If you are non-weight bearing do not put any weight on your foot.  Be sure to take your medications as directed. Try to get a restful sleep and begin more normal activities as tolerated.    HOW MUCH PAIN SHOULD I EXPECT AND WILL I HAVE PAIN MEDICATION?    Everyone tolerates pain differently. Still, each type of surgery involves a certain level and type of pain. Generally most people feel better within a few days but keep in mind that everyone has a different tolerance to pain. All medications should be taken as directed. All medications can have side effects such as bleeding, upset stomach, headaches, dizziness, constipation, etc. If you have any major problems or allergic reaction, stop the medication and call the office. If you only have a little pain, you may simply take Tylenol or Ibuprofen as directed on the label.     WHAT ABOUT MY DRESSING AND WHEN DO I COME BACK TO THE OFFICE?    The outer dressing stays in place for 7 days and when you come in for your first post-op we will remove it.  Some patients may have staples, zipper or sutures; these stay in for 10-14 days and will be removed at your 2nd post-op visit. After 24 hours you may shower using shower  precautions.    WHAT ABOUT SWELLING, REDNESS, BRUISING OR DRAINAGE?    It is normal to have some swelling, and bruising after surgery. It gradually subsides but may be present for several weeks. Elevation and icing will help to reduce the swelling more rapidly.  If your bandage is really tight after 24 hours you may cut the bandage an inch by the toes or under your foot and by your ankle.  Ice therapy often works better than oral pain medication. Using cold therapy is recommended every hour for 20 minutes.    WHEN CAN I TAKE A BATH?    You can take a bath as long as the wound has no drainage and is fully healed without a scab. This generally takes about 2 weeks.  Unless you get the bandaged protector from above then you can take a shower when you feel up to it.    WHEN CAN I RETURN TO WORK?    You may return to work to an office-type job whenever you feel comfortable enough. The only restriction would be your own motivation and pain tolerance. If your job requires vigorous activities you may be off 1-4 weeks which is determined by what surgery you have and your operating physician.     WHAT ABOUT DRIVING OR FLYING?    As a general rule, you may resume driving as soon as you are comfortable and fully alert and off narcotic pain medications. If you are traveling by plane within 4 weeks of surgery, perform range of motion exercises of the legs and feet during the flight. Get up and walk around frequently to prevent blood clots.      WHEN CAN I EXERSICE?    You may return to normal activities such as exercising when your surgeon tells you usually 2 weeks. Walking, sitting, standing and going up the stairs are all fine after the 2-week annabel. You may have restrictions for 1-4 weeks of no lifting, pushing, pulling in excess of 20 lbs. This is determined by what surgery you have and your surgeon.    WILL I BECOME ADDICTED TO MY PAIN MEDICATION?    Pain medications are safe and effective when used as directed. However,  misuse of these products can be extremely harmful and even deadly. Pain medications must be taken only as directed by your surgeon. Do not change the dose of your pain medication without talking to your operating physician first.    POSTOPERATIVE INFORMATION: MANAGING PAIN  Measuring Your Pain    A pain scale helps you rate pain intensity. In the scale, 0 means no pain, and 10 is the worst pain possible.  You should rate your pain every 4-6 hours. You may feel some pain even with medications. It is important to tell your health care provider if medications don't reduce the pain.        Managing Post-Op Pain at Home: Medications    Pain after an operation (post-op pain) is common and expected. These guidelines can help you stay as comfortable as possible.    Taking Pain Medications    Take any prescribed pain medications as directed by your doctor.  Take pain medications with some food to avoid an upset stomach.  Be aware that narcotic pain medications cause constipation. We recommend taking Milk of Magnesia   Eating high-fiber foods and drink plenty of water.  Don t drink alcohol while using pain medications.  Possible side effects include stomach upset, nausea, and itching.    Alternating Ibuprofen with your pain medication    Ibuprofen has three actions: it reduces fever, relieves pain and fights inflammation. Alternate between your prescribed pain medication and Ibuprofen every three hours (i.e., take a dose of Ibuprofen then three hours later take your prescribed pain medication then three hours later Ibuprofen, ect.) These two medications are safe to use together like this.    POSTOPERATIVE INFORMATION: CONSTIPATION    Constipation after surgery is a common problem and is often related to taking post-operative narcotic pain medication. Signs that you may be constipated include bloating, abdominal distention and/or pain.    Constipation Prevention & Treatment    Drink plenty of fluids! This is the most important  thing you can do to help prevent constipation.  Increase physical activity as recommended by your surgeon.  Consume a high fiber diet. We recommend introducing high fiber foods pre-operatively. Some foods high in fiber include:    Oatmeal Bran  Flaxseed Dried Fruit    Carrots   Bananas Strawberries Oranges Whole Grain Bread     Bananas Prunes  Pears  Raspberries     Over the counter medication/supplements - in order of recommended treatment:   (Be sure to follow instructions on product packaging)    Fiber supplement   Bulk forming laxative - Can be used to prevent constipation  Great food sources of fiber include but are not limited to:  Colace (docusate sodium)  Osmotic stool softener - Can be used 2-3 times per day to prevent constipation  Milk of Magnesia  MIRALAX  Enema/Suppository    Patient can also  some probiotics such as Culturelle to help prevent Antibiotic associated diarrhea. They can take this 1 hour before or 2 hours after taking their antibiotic should not be taken at the same time as they can cancel out the effects.                          ** AFTER SURGERY INSTRUCTIONS **                          WEIGHT BEARING       [] During surgery   will apply a gauze dressing to your foot. This will remain intact until you are seen in clinic for follow up    [] It is NOT OKAY to get your surgical site wet while bathing, you will need to purchase a cast cover to protect your foot from getting wet. You can purchase this at [facility] or your local pharmacy.    [] It is important that you elevate your affected foot after surgery. Proper elevation is raising your foot above your waist. The fluid in your lower extremities needs to get back to your heart so it can get pumped to your kidneys and expelled through urination. So if you notice you have to go to the bathroom more frequently when you are elevating your leg this is a good sign that it is working.     [] It is important that you  increase your protein intake after surgery. Protein is essential for wound healing. We recommend you take a protein supplement twice per day. This is in addition to your normal diet. Examples of protein supplements are Ensure, Boost, Glucerna (if you are diabetic), or protein powder. You can purchase these at your local retailer or grocery store.       Notify our office right away, if you have any changes in your health status, or if you develop a cold, flu, diarrhea, infection, fever or sore throat before your scheduled surgery date. We can be reached at [phone] Monday-Friday 8 am-4:30 pm if you have any questions.     Thank you for trusting us with your care!      For informational purposes only. Not to replace the advice of your health care provider. Copyright   2020 [employer]. All rights reserved. Clinically reviewed by [facility] and the [facility] COVID-19 Clinical Team.  [employer] [id number] - Rev 09/09/21.

## 2022-08-09 NOTE — LETTER
8/9/2022         RE: Otis Brumfield  [address] [facility] [address]        Dear Colleague,    Thank you for referring your patient, Otis Brumfield, to the [facility]. Please see a copy of my visit note below.    FOOT AND ANKLE SURGERY/PODIATRY CONSULT NOTE         ASSESSMENT:   Verruca plantaris right foot  Planter fasciitis right foot  Plantarflexed second tarsal right foot      TREATMENT:  The patient was given a cortisone injection in the right heel today consisting of 1 cc of dexamethasone sodium phosphate and 1 cc of 2% lidocaine plain.  I have also recommended prescription orthotics.  The patient was asked to take OTC ibuprofen 3 times daily for the next 2 weeks.  I have asked the patient to return to clinic in 1 week if his heel pain persist at which time I would recommend a second cortisone injection.  The patient has been scheduled for CO2 laser ablation of the wart on the plantar lateral aspect of the right foot.  The patient was told the procedure is performed on an outpatient basis under local anesthesia with IV sedation.  He was asked to go n.p.o. at midnight prior to the procedure and he was asked to see his primary care physician for preoperative consultation.  All pertinent questions were invited and answered.        HPI: I was asked to see Otis Brumfield today to evaluate and treat right foot pain.  The patient indicated that he has had warts for several years.  He has been treated on 4 different occasions with cryotherapy.  He stated that the recalcitrant wart is somewhat painful with weightbearing and ambulation.  The pain is only relieved with nonweightbearing.  He denies any redness, swelling, drainage or bleeding.  The patient also complained of pain involving the right heel.  He indicated he has had heel pain for at least 2 years.  He tried modifying shoe gear and over-the-counter inserts with very little relief.  The pain is aggravated with weightbearing  and ambulation.  He stated that the pain is more pronounced when he first gets out of bed in the morning or after long periods of rest.  He denies any trauma to the right heel.  He has not had any associated redness or swelling.  The pain is only relieved with nonweightbearing.  He also complains of pain in the ball of his right foot near the second toe.  It is a sharp pain with weightbearing and ambulation.  This pain is also relieved with nonweightbearing.  He denies any other previous treatment.  The patient was seen in consultation at the request of Lorne العراقي MD for evaluation and treatment of right foot pain.     Past Medical History:   Diagnosis Date     Benign neoplasm of colon     Created by Conversion      Diverticulosis of large intestine     Created by Conversion  Replacement Utility updated for latest IMO load     Hypertension      Hypertrophic and atrophic condition of skin     Created by Conversion  Replacement Utility updated for latest IMO load       Social History     Socioeconomic History     Marital status:      Spouse name: Not on file     Number of children: Not on file     Years of education: Not on file     Highest education level: Not on file   Occupational History     Not on file   Tobacco Use     Smoking status: Light Tobacco Smoker     Types: Cigars     Smokeless tobacco: Never Used     Tobacco comment: cigar occassionally   Substance and Sexual Activity     Alcohol use: Yes     Alcohol/week: 3.0 - 4.0 standard drinks     Drug use: No     Sexual activity: Not on file   Other Topics Concern     Not on file   Social History Narrative     Not on file     Social Determinants of Health     Financial Resource Strain: Not on file   Food Insecurity: Not on file   Transportation Needs: Not on file   Physical Activity: Not on file   Stress: Not on file   Social Connections: Not on file   Intimate Partner Violence: Not on file   Housing Stability: Not on file        No Known Allergies  "      Current Outpatient Medications:      lisinopril-hydrochlorothiazide (ZESTORETIC) 10-12.5 MG tablet, TAKE ONE TABLET BY MOUTH ONE TIME DAILY, Disp: 90 tablet, Rfl: 0     Family History   Problem Relation Age of Onset     Colon Cancer Father      Diabetes Mother      Diabetes Brother         Social History     Socioeconomic History     Marital status:      Spouse name: Not on file     Number of children: Not on file     Years of education: Not on file     Highest education level: Not on file   Occupational History     Not on file   Tobacco Use     Smoking status: Light Tobacco Smoker     Types: Cigars     Smokeless tobacco: Never Used     Tobacco comment: cigar occassionally   Substance and Sexual Activity     Alcohol use: Yes     Alcohol/week: 3.0 - 4.0 standard drinks     Drug use: No     Sexual activity: Not on file   Other Topics Concern     Not on file   Social History Narrative     Not on file     Social Determinants of Health     Financial Resource Strain: Not on file   Food Insecurity: Not on file   Transportation Needs: Not on file   Physical Activity: Not on file   Stress: Not on file   Social Connections: Not on file   Intimate Partner Violence: Not on file   Housing Stability: Not on file        Review of Systems - Patient denies fever, chills, rash, wound, stiffness, limping, numbness, weakness, heart burn, blood in stool, chest pain with activity, calf pain when walking, shortness of breath with activity, chronic cough, easy bleeding/bruising, swelling of ankles, excessive thirst, fatigue, depression, anxiety.  Patient admits to right foot pain.      OBJECTIVE:  Appearance: alert, well appearing, and in no distress.    Pulse 92   Ht 1.778 m (5' 10\")   Wt 97.5 kg (215 lb)   SpO2 99%   BMI 30.85 kg/m       Body mass index is 30.85 kg/m .     General appearance: Patient is alert and fully cooperative with history & exam.  No sign of distress is noted during the visit.  Psychiatric: Affect " is pleasant & appropriate.  Patient appears motivated to improve health.  Respiratory: Breathing is regular & unlabored while sitting.  HEENT: Hearing is intact to spoken word.  Speech is clear.  No gross evidence of visual impairment that would impact ambulation.    Vascular: Dorsalis pedis and posterior tibial pulses are palpable. There is pedal hair growth bilaterally.  CFT < 3 sec from anterior tibial surface to distal digits bilaterally. There is no appreciable edema noted.  Dermatologic: 2 small round hyperkeratotic nucleated lesions on the plantar lateral aspect of the right heel.  Pinpoint bleeding noted at the base of these lesions.  Turgor and texture are within normal limits. No coloration or temperature changes. No primary or secondary lesions noted.  Neurologic: All epicritic and proprioceptive sensations are grossly intact bilaterally.  Musculoskeletal: All active and passive ankle, subtalar, midtarsal, and 1st MPJ range of motion are grossly intact without pain or crepitus, with the exception of none. Manual muscle strength is within normal limits bilaterally. All dorsiflexors, plantarflexors, invertors, evertors are intact bilaterally. Tenderness present to the plantar medial aspect of the right heel and the subsecond metatarsal head right foot on palpation.  No tenderness to the right foot or ankle with range of motion. Calf is soft/non-tender without warmth/induration    Imaging:       No images are attached to the encounter or orders placed in the encounter.     No results found.   No results found.       Rafita Osborn DPM  [facility] Foot & Ankle Surgery/Podiatry         Again, thank you for allowing me to participate in the care of your patient.        Sincerely,        Rafita Chaves DPM

## 2022-08-09 NOTE — PROGRESS NOTES
FOOT AND ANKLE SURGERY/PODIATRY CONSULT NOTE         ASSESSMENT:   Verruca plantaris right foot  Planter fasciitis right foot  Plantarflexed second tarsal right foot      TREATMENT:  The patient was given a cortisone injection in the right heel today consisting of 1 cc of dexamethasone sodium phosphate and 1 cc of 2% lidocaine plain.  I have also recommended prescription orthotics.  The patient was asked to take OTC ibuprofen 3 times daily for the next 2 weeks.  I have asked the patient to return to clinic in 1 week if his heel pain persist at which time I would recommend a second cortisone injection.  The patient has been scheduled for CO2 laser ablation of the wart on the plantar lateral aspect of the right foot.  The patient was told the procedure is performed on an outpatient basis under local anesthesia with IV sedation.  He was asked to go n.p.o. at midnight prior to the procedure and he was asked to see his primary care physician for preoperative consultation.  All pertinent questions were invited and answered.        HPI: I was asked to see Otis Brumfield today to evaluate and treat right foot pain.  The patient indicated that he has had warts for several years.  He has been treated on 4 different occasions with cryotherapy.  He stated that the recalcitrant wart is somewhat painful with weightbearing and ambulation.  The pain is only relieved with nonweightbearing.  He denies any redness, swelling, drainage or bleeding.  The patient also complained of pain involving the right heel.  He indicated he has had heel pain for at least 2 years.  He tried modifying shoe gear and over-the-counter inserts with very little relief.  The pain is aggravated with weightbearing and ambulation.  He stated that the pain is more pronounced when he first gets out of bed in the morning or after long periods of rest.  He denies any trauma to the right heel.  He has not had any associated redness or swelling.  The pain is only  relieved with nonweightbearing.  He also complains of pain in the ball of his right foot near the second toe.  It is a sharp pain with weightbearing and ambulation.  This pain is also relieved with nonweightbearing.  He denies any other previous treatment.  The patient was seen in consultation at the request of Lorne العراقي MD for evaluation and treatment of right foot pain.     Past Medical History:   Diagnosis Date     Benign neoplasm of colon     Created by Conversion      Diverticulosis of large intestine     Created by Conversion  Replacement Utility updated for latest IMO load     Hypertension      Hypertrophic and atrophic condition of skin     Created by Conversion  Replacement Utility updated for latest IMO load       Social History     Socioeconomic History     Marital status:      Spouse name: Not on file     Number of children: Not on file     Years of education: Not on file     Highest education level: Not on file   Occupational History     Not on file   Tobacco Use     Smoking status: Light Tobacco Smoker     Types: Cigars     Smokeless tobacco: Never Used     Tobacco comment: cigar occassionally   Substance and Sexual Activity     Alcohol use: Yes     Alcohol/week: 3.0 - 4.0 standard drinks     Drug use: No     Sexual activity: Not on file   Other Topics Concern     Not on file   Social History Narrative     Not on file     Social Determinants of Health     Financial Resource Strain: Not on file   Food Insecurity: Not on file   Transportation Needs: Not on file   Physical Activity: Not on file   Stress: Not on file   Social Connections: Not on file   Intimate Partner Violence: Not on file   Housing Stability: Not on file        No Known Allergies       Current Outpatient Medications:      lisinopril-hydrochlorothiazide (ZESTORETIC) 10-12.5 MG tablet, TAKE ONE TABLET BY MOUTH ONE TIME DAILY, Disp: 90 tablet, Rfl: 0     Family History   Problem Relation Age of Onset     Colon Cancer Father       "Diabetes Mother      Diabetes Brother         Social History     Socioeconomic History     Marital status:      Spouse name: Not on file     Number of children: Not on file     Years of education: Not on file     Highest education level: Not on file   Occupational History     Not on file   Tobacco Use     Smoking status: Light Tobacco Smoker     Types: Cigars     Smokeless tobacco: Never Used     Tobacco comment: cigar occassionally   Substance and Sexual Activity     Alcohol use: Yes     Alcohol/week: 3.0 - 4.0 standard drinks     Drug use: No     Sexual activity: Not on file   Other Topics Concern     Not on file   Social History Narrative     Not on file     Social Determinants of Health     Financial Resource Strain: Not on file   Food Insecurity: Not on file   Transportation Needs: Not on file   Physical Activity: Not on file   Stress: Not on file   Social Connections: Not on file   Intimate Partner Violence: Not on file   Housing Stability: Not on file        Review of Systems - Patient denies fever, chills, rash, wound, stiffness, limping, numbness, weakness, heart burn, blood in stool, chest pain with activity, calf pain when walking, shortness of breath with activity, chronic cough, easy bleeding/bruising, swelling of ankles, excessive thirst, fatigue, depression, anxiety.  Patient admits to right foot pain.      OBJECTIVE:  Appearance: alert, well appearing, and in no distress.    Pulse 92   Ht 1.778 m (5' 10\")   Wt 97.5 kg (215 lb)   SpO2 99%   BMI 30.85 kg/m       Body mass index is 30.85 kg/m .     General appearance: Patient is alert and fully cooperative with history & exam.  No sign of distress is noted during the visit.  Psychiatric: Affect is pleasant & appropriate.  Patient appears motivated to improve health.  Respiratory: Breathing is regular & unlabored while sitting.  HEENT: Hearing is intact to spoken word.  Speech is clear.  No gross evidence of visual impairment that would impact " ambulation.    Vascular: Dorsalis pedis and posterior tibial pulses are palpable. There is pedal hair growth bilaterally.  CFT < 3 sec from anterior tibial surface to distal digits bilaterally. There is no appreciable edema noted.  Dermatologic: 2 small round hyperkeratotic nucleated lesions on the plantar lateral aspect of the right heel.  Pinpoint bleeding noted at the base of these lesions.  Turgor and texture are within normal limits. No coloration or temperature changes. No primary or secondary lesions noted.  Neurologic: All epicritic and proprioceptive sensations are grossly intact bilaterally.  Musculoskeletal: All active and passive ankle, subtalar, midtarsal, and 1st MPJ range of motion are grossly intact without pain or crepitus, with the exception of none. Manual muscle strength is within normal limits bilaterally. All dorsiflexors, plantarflexors, invertors, evertors are intact bilaterally. Tenderness present to the plantar medial aspect of the right heel and the subsecond metatarsal head right foot on palpation.  No tenderness to the right foot or ankle with range of motion. Calf is soft/non-tender without warmth/induration    Imaging:       No images are attached to the encounter or orders placed in the encounter.     No results found.   No results found.       Rafita Chaves; JANE  [facility] Foot & Ankle Surgery/Podiatry

## 2022-08-10 ENCOUNTER — TELEPHONE (OUTPATIENT)
Dept: PODIATRY | Facility: CLINIC | Age: 67
End: 2022-08-10

## 2022-08-10 PROBLEM — B07.0 PLANTAR WARTS: Status: ACTIVE | Noted: 2022-08-10

## 2022-08-10 NOTE — TELEPHONE ENCOUNTER
Surgery scheduled with Dr. Chaves at [facility] on 09/19/22    Email sent to [facility].    Added to [place].    Patient was directed to schedule a pre-op physical and instructed on home COVID testing.    Call back to schedule post ops and send [employer] message when complete.

## 2022-09-01 ENCOUNTER — OFFICE VISIT (OUTPATIENT)
Dept: FAMILY MEDICINE | Facility: CLINIC | Age: 67
End: 2022-09-01
Payer: COMMERCIAL

## 2022-09-01 VITALS
DIASTOLIC BLOOD PRESSURE: 86 MMHG | WEIGHT: 219.2 LBS | BODY MASS INDEX: 31.45 KG/M2 | HEART RATE: 90 BPM | TEMPERATURE: 98.2 F | OXYGEN SATURATION: 98 % | SYSTOLIC BLOOD PRESSURE: 146 MMHG

## 2022-09-01 DIAGNOSIS — G95.20 CERVICAL SPINAL CORD COMPRESSION (H): ICD-10-CM

## 2022-09-01 DIAGNOSIS — D12.6 TUBULAR ADENOMA OF COLON: ICD-10-CM

## 2022-09-01 DIAGNOSIS — R73.01 IMPAIRED FASTING GLUCOSE: ICD-10-CM

## 2022-09-01 DIAGNOSIS — Z01.818 PREOP GENERAL PHYSICAL EXAM: Primary | ICD-10-CM

## 2022-09-01 DIAGNOSIS — I10 ESSENTIAL HYPERTENSION: ICD-10-CM

## 2022-09-01 DIAGNOSIS — B07.0 PLANTAR WARTS: ICD-10-CM

## 2022-09-01 LAB
ANION GAP SERPL CALCULATED.3IONS-SCNC: 10 MMOL/L (ref 7–15)
BUN SERPL-MCNC: 18.8 MG/DL (ref 8–23)
CALCIUM SERPL-MCNC: 10.1 MG/DL (ref 8.8–10.2)
CHLORIDE SERPL-SCNC: 104 MMOL/L (ref 98–107)
CREAT SERPL-MCNC: 1.15 MG/DL (ref 0.67–1.17)
DEPRECATED HCO3 PLAS-SCNC: 26 MMOL/L (ref 22–29)
GFR SERPL CREATININE-BSD FRML MDRD: 69 ML/MIN/1.73M2
GLUCOSE SERPL-MCNC: 109 MG/DL (ref 70–99)
HBA1C MFR BLD: 5.4 % (ref 0–5.6)
POTASSIUM SERPL-SCNC: 4.2 MMOL/L (ref 3.4–5.3)
SODIUM SERPL-SCNC: 140 MMOL/L (ref 136–145)

## 2022-09-01 PROCEDURE — 99214 OFFICE O/P EST MOD 30 MIN: CPT | Performed by: FAMILY MEDICINE

## 2022-09-01 PROCEDURE — 83036 HEMOGLOBIN GLYCOSYLATED A1C: CPT | Performed by: FAMILY MEDICINE

## 2022-09-01 PROCEDURE — 36415 COLL VENOUS BLD VENIPUNCTURE: CPT | Performed by: FAMILY MEDICINE

## 2022-09-01 PROCEDURE — 80048 BASIC METABOLIC PNL TOTAL CA: CPT | Performed by: FAMILY MEDICINE

## 2022-09-01 RX ORDER — LISINOPRIL/HYDROCHLOROTHIAZIDE 10-12.5 MG
1 TABLET ORAL DAILY
Qty: 90 TABLET | Refills: 1 | Status: SHIPPED | OUTPATIENT
Start: 2022-09-01 | End: 2023-02-26

## 2022-09-01 ASSESSMENT — PAIN SCALES - GENERAL: PAINLEVEL: NO PAIN (0)

## 2022-09-01 NOTE — PROGRESS NOTES
[facility]  [address] [place] [address] [address]VE [facility] [address]  [facility] [address]  Phone: [phone]  Fax: [phone]  Primary Provider: Norberot Posadas  Pre-op Performing Provider: NORBERTO POSADAS      PREOPERATIVE EVALUATION:  Today's date: 9/1/2022    Otis Brumfield is a 67 year old male who presents for a preoperative evaluation.    Surgical Information:  Surgery/Procedure: Laser ablation on wart right foot  Surgery Location: [facility]  Surgeon: Dr. Chaves  Surgery Date: 9/19/22  Time of Surgery: 7:00am  Where patient plans to recover: At home with family  Fax number for surgical facility: Note does not need to be faxed, will be available electronically in Epic.    Type of Anesthesia Anticipated: to be determined    Preop general physical exam  Preoperative examination completed.  No contraindication to scheduled laser ablation of right foot wart identified.    Plantar warts  As above.  Laser ablation procedure scheduled.    Essential hypertension  Refill on lisinopril hydrochlorothiazide 10/12.5 daily.  Blood pressure 150/78 on recheck and will monitor outside of office for goal less than 130/80 ideally.  States had 5 cups of coffee this morning.  With a friend.  Will reassess at annual wellness visit in the next 3 months or so.  - lisinopril-hydrochlorothiazide (ZESTORETIC) 10-12.5 MG tablet  Dispense: 90 tablet; Refill: 1  - Basic metabolic panel    Cervical spinal cord compression (H)  History of degenerative cervical disc disease with spinal cord compression.  Had been seen by Dr. Rosette Hylton however symptomatic improvement with conservative treatments which continue.    Impaired fasting glucose  Impaired fasting glucose history.  A1c updated  - Hemoglobin A1c    Tubular adenoma of colon  Prior tubular adenoma of colon on colonoscopy August 8, 2019 told to repeat at 5-year interval.       Subjective     HPI related to upcoming procedure: Patient seen today for preoperative  examination.  Persistent right foot plantars warts identified.  Laser ablation procedure scheduled for .  Failure of prior treatments noted.  Underlying hypertension and continues lisinopril hydrochlorothiazide 10/12.5 daily and will need refill.  Blood pressures typically tend to be better and not so high however did have 5 cups of coffee this morning with a friend.  History of cervical degenerative disc disease with cervical spinal cord compression historically.  Has been seen by spine specialist however conservative treatments have allowed patient to avoid surgical intervention.  Impaired fasting glucose historically.  Tubular adenoma of colon as well on colonoscopy 2019 and told to repeat at 5-year interval.  Comprehensive review of systems as above otherwise all negative.     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   1 older bro - DM   Surgeries: inguinal hernia repair ? left side; right rotator cuff surgery  (Dr. Gabriele Abernathy)  Hospitalizations: none    Preop Questions 2022   1. Have you ever had a heart attack or stroke? No   2. Have you ever had surgery on your heart or blood vessels, such as a stent placement, a coronary artery bypass, or surgery on an artery in your head, neck, heart, or legs? No   3. Do you have chest pain with activity? No   4. Do you have a history of  heart failure? No   5. Do you currently have a cold, bronchitis or symptoms of other infection? No   6. Do you have a cough, shortness of breath, or wheezing? No   7. Do you or anyone in your family have previous history of blood clots? No   8. Do you or does anyone in your family  have a serious bleeding problem such as prolonged bleeding following surgeries or cuts? No   9. Have you ever had problems with anemia or been told to take iron pills? No   10. Have you had any abnormal blood loss such as black, tarry or bloody stools? No   11. Have you ever had a blood transfusion? No   12. Are you willing to have a blood transfusion if it is medically needed before, during, or after your surgery? Yes   13. Have you or any of your relatives ever had problems with anesthesia? No   14. Do you have sleep apnea, excessive snoring or daytime drowsiness? No   15. Do you have any artifical heart valves or other implanted medical devices like a pacemaker, defibrillator, or continuous glucose monitor? No   16. Do you have artificial joints? No   17. Are you allergic to latex? No       Health Care Directive:  Patient does not have a Health Care Directive or Living Will: Discussed advance care planning with patient; information given to patient to review.    Preoperative Review of :   reviewed - no record of controlled substances prescribed.      Status of Chronic Conditions:  See problem list for active medical problems.  Problems all longstanding and stable, except as noted/documented.  See ROS for pertinent symptoms related to these conditions.      Review of Systems  CONSTITUTIONAL: NEGATIVE for fever, chills, change in weight  INTEGUMENTARY/SKIN: NEGATIVE for worrisome rashes, moles or lesions  EYES: NEGATIVE for vision changes or irritation  ENT/MOUTH: NEGATIVE for ear, mouth and throat problems  RESP: NEGATIVE for significant cough or SOB  CV: NEGATIVE for chest pain, palpitations or peripheral edema  GI: NEGATIVE for nausea, abdominal pain, heartburn, or change in bowel habits  : NEGATIVE for frequency, dysuria, or hematuria  MUSCULOSKELETAL: NEGATIVE for significant arthralgias or myalgia  NEURO: NEGATIVE for weakness, dizziness or paresthesias  ENDOCRINE: NEGATIVE for temperature  intolerance, skin/hair changes  HEME: NEGATIVE for bleeding problems  PSYCHIATRIC: NEGATIVE for changes in mood or affect    Patient Active Problem List    Diagnosis Date Noted     Cervical spinal cord compression (H) 09/01/2022     Priority: Medium     Plantar warts 08/10/2022     Priority: Medium     Added automatically from request for surgery [id number]       Hypertensive heart disease without heart failure 08/20/2019     Priority: Medium     Pneumonia 06/25/2018     Priority: Medium     Diverticulosis      Priority: Medium     Created by Conversion  Replacement Utility updated for latest IMO load      Formatting of this note might be different from the original.  Created by Conversion    Replacement Utility updated for latest IMO load       Hypertension      Priority: Medium     Created by Conversion  Replacement Utility updated for latest IMO load      Formatting of this note might be different from the original.  Created by Conversion    Replacement Utility updated for latest IMO load       Acrochordon      Priority: Medium     Created by Conversion  Replacement Utility updated for latest IMO load      Formatting of this note might be different from the original.  Created by Conversion    Replacement Utility updated for latest IMO load       Joint Pain, Localized In The Right Shoulder      Priority: Medium     Created by Conversion      Formatting of this note might be different from the original.  Created by Conversion       Benign Polyps Of The Large Intestine      Priority: Medium     Created by Conversion      Formatting of this note might be different from the original.  Created by Conversion       Acute Meniscal Tear      Priority: Medium     Created by Conversion      Formatting of this note might be different from the original.  Created by Conversion        Past Medical History:   Diagnosis Date     Benign neoplasm of colon     Created by Conversion      Diverticulosis of large intestine     Created by  Conversion  Replacement Utility updated for latest IMO load     Hypertension      Hypertrophic and atrophic condition of skin     Created by Conversion  Replacement Utility updated for latest IMO load     Past Surgical History:   Procedure Laterality Date     HC REMOVAL OF TONSILS,<13 Y/O      Description: Tonsillectomy;  Recorded: 05/24/2007;     HERNIA REPAIR Left      REMOVAL OF SPERM DUCT(S)      Description: Surgery Of Male Genitalia Vasectomy;  Recorded: 05/24/2007;     Current Outpatient Medications   Medication Sig Dispense Refill     lisinopril-hydrochlorothiazide (ZESTORETIC) 10-12.5 MG tablet Take 1 tablet by mouth daily 90 tablet 1       No Known Allergies     Social History     Tobacco Use     Smoking status: Light Tobacco Smoker     Types: Cigars     Smokeless tobacco: Never Used     Tobacco comment: cigar occassionally   Substance Use Topics     Alcohol use: Yes     Alcohol/week: 3.0 - 4.0 standard drinks     Family History   Problem Relation Age of Onset     Colon Cancer Father      Diabetes Mother      Diabetes Brother      History   Drug Use No         Objective     BP (!) 146/86 (BP Location: Left arm, Cuff Size: Adult Large)   Pulse 90   Temp 98.2  F (36.8  C) (Oral)   Wt 99.4 kg (219 lb 3.2 oz)   SpO2 98%   BMI 31.45 kg/m      Physical Exam    GENERAL APPEARANCE: healthy, alert and no distress     EYES: EOMI,  PERRL     HENT: ear canals and TM's normal and nose and mouth without ulcers or lesions     NECK: no adenopathy, no asymmetry, masses, or scars and thyroid normal to palpation     RESP: lungs clear to auscultation - no rales, rhonchi or wheezes     CV: regular rates and rhythm, normal S1 S2, no S3 or S4 and no murmur, click or rub     ABDOMEN:  soft, nontender, no HSM or masses and bowel sounds normal     MS: extremities with pes planus deformity, no evidence of inflammation in joints, FROM in all extremities.     SKIN: no suspicious lesions or rashes.  Persistent plantars wart  midfoot right foot with pes planus deformity.     NEURO: Normal strength and tone, sensory exam grossly normal, mentation intact and speech normal     PSYCH: mentation appears normal. and affect normal/bright     LYMPHATICS: No cervical adenopathy    Recent Labs   Lab Test 01/13/21  1352      POTASSIUM 4.2   CR 1.07   A1C 5.4        Diagnostics:  Labs pending at this time.  Results will be reviewed when available.  No results found for this or any previous visit (from the past 24 hour(s)).   No EKG required for low risk surgery (cataract, skin procedure, breast biopsy, etc).    Revised Cardiac Risk Index (RCRI):  The patient has the following serious cardiovascular risks for perioperative complications:   - No serious cardiac risks = 0 points     RCRI Interpretation: 0 points: Class I (very low risk - 0.4% complication rate)        EXAM: MR CERVICAL SPINE WO CONTRAST  LOCATION: [facility]  DATE/TIME: 1/29/2021 10:33 AM     INDICATION: chronic neck pain  COMPARISON: None.  TECHNIQUE: MRI Cervical Spine without IV contrast.     FINDINGS:   Retrolisthesis of C3 on C4 measuring 3 mm. Anterolisthesis of C6 on C7 measuring 1.3 mm. Mild degenerative endplate changes and anterior marginal osteophytes at C3/C4-C5/C6. The vertebral bodies of the cervical spine otherwise have normal stature,   alignment, and marrow signal intensity.     No evidence of signal abnormality or expansion within the cervical spinal cord.     C2/C3:  Mild loss of disc signal with normal disc height. No posterior disc bulge or spinal canal narrowing. No neural foraminal     C3/C4:  Mild loss of disc signal and disc height. Broad bar disc osteophyte complex with severe spinal canal narrowing (AP diameter the spinal canal measuring 5 mm) with compression of the cervical spinal cord without evidence of edema or myelomalacia.   Uncovertebral joint disease and facet arthropathy with severe bilateral neural foraminal  narrowing.     C4/C5:  Mild loss of disc signal and disc height. Broad bar disc osteophyte complex with mild spinal canal narrowing. Uncovertebral joint disease and facet arthropathy with severe left and mild right neural foraminal narrowing.     C5/C6:  Mild loss of disc signal and disc height. Broad bar disc osteophyte complex with moderate spinal canal narrowing and near complete effacement of the CSF surrounding the cervical spinal cord. Uncovertebral joint disease and facet arthropathy with      severe left and moderate right neural foraminal narrowing.       C6/C7:  Moderate loss of disc signal and disc height. Broad bar disc osteophyte complex with moderate spinal canal narrowing. Uncovertebral joint disease and facet arthropathy with severe bilateral neural foraminal narrowing.     C7/T1: Mild loss of disc signal and disc height. No posterior disc bulge or spinal canal narrowing. Mild bilateral neural foraminal narrowing.      IMPRESSION:  1.  Multilevel degenerative disc disease of the cervical spine with disc osteophyte complexes, most pronounced at the C3/C4 level where there is streak spinal canal narrowing (AP diameter the spinal canal measuring 5 mm) with compression of the cervical      spinal cord without evidence of edema or myelomalacia.  2.  Multilevel uncovertebral joint disease and facet arthropathy with severe multilevel neural foraminal narrowing at C3/C4-C6/C7 as described above.        EXAM: XR CERVICAL SPINE 4 - 5 VWS  LOCATION: [facility]  DATE/TIME: 2/9/2021 8:51 AM     INDICATION: Cervicalgia.  COMPARISON: Cervical spine MRI 01/29/2021.  TECHNIQUE: CR Cervical Spine.     IMPRESSION:  Cervical levels seen to C6-C7 on the lateral views. Levels below this are obscured by overlying shoulder artifact. 1 mm retrolisthesis C2 on C3 on the neutral view reduces on flexion and is stable on extension. 3 mm retrolisthesis C3 on C4   reduces to 2 mm on flexion and is  relatively stable on extension. 1.5 mm anterolisthesis C4 on C5 increases to 2 mm on flexion and reduces to 1 mm on extension. No other subluxation. Marked loss of disc space height C3-C4, C5-C6, and C6-C7. Multilevel   facet arthropathy. The odontoid process is grossly normal. Visualized lungs are clear.       Signed Electronically by: Terry Posadas MD  Copy of this evaluation report is provided to requesting physician.

## 2022-09-01 NOTE — PATIENT INSTRUCTIONS
Preparing for Your Surgery  Getting started  A nurse will call you to review your health history and instructions. They will give you an arrival time based on your scheduled surgery time. Please be ready to share:    Your doctor's clinic name and phone number    Your medical, surgical and anesthesia history    A list of allergies and sensitivities    A list of medicines, including herbal treatments and over-the-counter drugs    Whether the patient has a legal guardian (ask how to send us the papers in advance)  Please tell us if you're pregnant--or if there's any chance you might be pregnant. Some surgeries may injure a fetus (unborn baby), so they require a pregnancy test. Surgeries that are safe for a fetus don't always need a test, and you can choose whether to have one.   If you have a child who's having surgery, please ask for a copy of Preparing for Your Child's Surgery.    Preparing for surgery    Within 30 days of surgery: Have a pre-op exam (sometimes called an H&P, or History and Physical). This can be done at a clinic or pre-operative center.  ? If you're having a , you may not need this exam. Talk to your care team.    At your pre-op exam, talk to your care team about all medicines you take. If you need to stop any medicines before surgery, ask when to start taking them again.  ? We do this for your safety. Many medicines can make you bleed too much during surgery. Some change how well surgery (anesthesia) drugs work.    Call your insurance company to let them know you're having surgery. (If you don't have insurance, call [phone].)    Call your clinic if there's any change in your health. This includes signs of a cold or flu (sore throat, runny nose, cough, rash, fever). It also includes a scrape or scratch near the surgery site.    If you have questions on the day of surgery, call your hospital or surgery center.  COVID testing  You may need to be tested for COVID-19 before having  surgery. If so, we will give you instructions.  Eating and drinking guidelines  For your safety: Unless your surgeon tells you otherwise, follow the guidelines below.    Eat and drink as usual until 8 hours before surgery. After that, no food or milk.    Drink clear liquids until 2 hours before surgery. These are liquids you can see through, like water, Gatorade and Propel Water. You may also have black coffee and tea (no cream or milk).    Nothing by mouth within 2 hours of surgery. This includes gum, candy and breath mints.    If you drink alcohol: Stop drinking it the night before surgery.    If your care team tells you to take medicine on the morning of surgery, it's okay to take it with a sip of water.  Preventing infection    Shower or bathe the night before and morning of your surgery. Follow the instructions your clinic gave you. (If no instructions, use regular soap.)    Don't shave or clip hair near your surgery site. We'll remove the hair if needed.    Don't smoke or vape the morning of surgery. You may chew nicotine gum up to 2 hours before surgery. A nicotine patch is okay.  ? Note: Some surgeries require you to completely quit smoking and nicotine. Check with your surgeon.    Your care team will make every effort to keep you safe from infection. We will:  ? Clean our hands often with soap and water (or an alcohol-based hand rub).  ? Clean the skin at your surgery site with a special soap that kills germs.  ? Give you a special gown to keep you warm. (Cold raises the risk of infection.)  ? Wear special hair covers, masks, gowns and gloves during surgery.  ? Give antibiotic medicine, if prescribed. Not all surgeries need antibiotics.  What to bring on the day of surgery    Photo ID and insurance card    Copy of your health care directive, if you have one    Glasses and hearing aides (bring cases)  ? You can't wear contacts during surgery    Inhaler and eye drops, if you use them (tell us about these when  you arrive)    CPAP machine or breathing device, if you use them    A few personal items, if spending the night    If you have . . .  ? A pacemaker, ICD (cardiac defibrillator) or other implant: Bring the ID card.  ? An implanted stimulator: Bring the remote control.  ? A legal guardian: Bring a copy of the certified (court-stamped) guardianship papers.  Please remove any jewelry, including body piercings. Leave jewelry and other valuables at home.  If you're going home the day of surgery    You must have a responsible adult drive you home. They should stay with you overnight as well.    If you don't have someone to stay with you, and you aren't safe to go home alone, we may keep you overnight. Insurance often won't pay for this.  After surgery  If it's hard to control your pain or you need more pain medicine, please call your surgeon's office.  Questions?   If you have any questions for your care team, list them here: _________________________________________________________________________________________________________________________________________________________________________ ____________________________________ ____________________________________ ____________________________________  For informational purposes only. Not to replace the advice of your health care provider. Copyright   2003, 2019 [facility]. All rights reserved. Clinically reviewed by Yazmin Teran MD. [employer] [id number] - REV 07/21.

## 2022-09-16 ENCOUNTER — ANESTHESIA EVENT (OUTPATIENT)
Dept: SURGERY | Facility: AMBULATORY SURGERY CENTER | Age: 67
End: 2022-09-16
Payer: COMMERCIAL

## 2022-09-19 ENCOUNTER — ANESTHESIA (OUTPATIENT)
Dept: SURGERY | Facility: AMBULATORY SURGERY CENTER | Age: 67
End: 2022-09-19
Payer: COMMERCIAL

## 2022-09-19 ENCOUNTER — HOSPITAL ENCOUNTER (OUTPATIENT)
Facility: AMBULATORY SURGERY CENTER | Age: 67
Discharge: HOME | End: 2022-09-19
Attending: PODIATRIST
Payer: COMMERCIAL

## 2022-09-19 VITALS
SYSTOLIC BLOOD PRESSURE: 93 MMHG | OXYGEN SATURATION: 93 % | RESPIRATION RATE: 16 BRPM | HEART RATE: 79 BPM | TEMPERATURE: 97.2 F | DIASTOLIC BLOOD PRESSURE: 51 MMHG

## 2022-09-19 DIAGNOSIS — B07.0 PLANTAR WARTS: ICD-10-CM

## 2022-09-19 PROCEDURE — 17110 DESTRUCTION B9 LES UP TO 14: CPT | Performed by: PODIATRIST

## 2022-09-19 RX ORDER — GLYCOPYRROLATE 0.2 MG/ML
INJECTION, SOLUTION INTRAMUSCULAR; INTRAVENOUS PRN
Status: DISCONTINUED | OUTPATIENT
Start: 2022-09-19 | End: 2022-09-19

## 2022-09-19 RX ORDER — ONDANSETRON 2 MG/ML
4 INJECTION INTRAMUSCULAR; INTRAVENOUS EVERY 30 MIN PRN
Status: DISCONTINUED | OUTPATIENT
Start: 2022-09-19 | End: 2022-09-20 | Stop reason: HOSPADM

## 2022-09-19 RX ORDER — SODIUM CHLORIDE, SODIUM LACTATE, POTASSIUM CHLORIDE, CALCIUM CHLORIDE 600; 310; 30; 20 MG/100ML; MG/100ML; MG/100ML; MG/100ML
INJECTION, SOLUTION INTRAVENOUS CONTINUOUS
Status: DISCONTINUED | OUTPATIENT
Start: 2022-09-19 | End: 2022-09-20 | Stop reason: HOSPADM

## 2022-09-19 RX ORDER — PROPOFOL 10 MG/ML
INJECTION, EMULSION INTRAVENOUS PRN
Status: DISCONTINUED | OUTPATIENT
Start: 2022-09-19 | End: 2022-09-19

## 2022-09-19 RX ORDER — LIDOCAINE 40 MG/G
CREAM TOPICAL
Status: DISCONTINUED | OUTPATIENT
Start: 2022-09-19 | End: 2022-09-20 | Stop reason: HOSPADM

## 2022-09-19 RX ORDER — MEPERIDINE HYDROCHLORIDE 25 MG/ML
12.5 INJECTION INTRAMUSCULAR; INTRAVENOUS; SUBCUTANEOUS
Status: DISCONTINUED | OUTPATIENT
Start: 2022-09-19 | End: 2022-09-20 | Stop reason: HOSPADM

## 2022-09-19 RX ORDER — FENTANYL CITRATE 50 UG/ML
INJECTION, SOLUTION INTRAMUSCULAR; INTRAVENOUS PRN
Status: DISCONTINUED | OUTPATIENT
Start: 2022-09-19 | End: 2022-09-19

## 2022-09-19 RX ORDER — ONDANSETRON 2 MG/ML
INJECTION INTRAMUSCULAR; INTRAVENOUS PRN
Status: DISCONTINUED | OUTPATIENT
Start: 2022-09-19 | End: 2022-09-19

## 2022-09-19 RX ORDER — SODIUM CHLORIDE, SODIUM LACTATE, POTASSIUM CHLORIDE, CALCIUM CHLORIDE 600; 310; 30; 20 MG/100ML; MG/100ML; MG/100ML; MG/100ML
INJECTION, SOLUTION INTRAVENOUS CONTINUOUS PRN
Status: DISCONTINUED | OUTPATIENT
Start: 2022-09-19 | End: 2022-09-19

## 2022-09-19 RX ORDER — ONDANSETRON 4 MG/1
4 TABLET, ORALLY DISINTEGRATING ORAL EVERY 30 MIN PRN
Status: DISCONTINUED | OUTPATIENT
Start: 2022-09-19 | End: 2022-09-20 | Stop reason: HOSPADM

## 2022-09-19 RX ORDER — OXYCODONE HYDROCHLORIDE 5 MG/1
5 TABLET ORAL EVERY 4 HOURS PRN
Status: DISCONTINUED | OUTPATIENT
Start: 2022-09-19 | End: 2022-09-20 | Stop reason: HOSPADM

## 2022-09-19 RX ORDER — LIDOCAINE HYDROCHLORIDE 10 MG/ML
INJECTION, SOLUTION INFILTRATION; PERINEURAL PRN
Status: DISCONTINUED | OUTPATIENT
Start: 2022-09-19 | End: 2022-09-19

## 2022-09-19 RX ORDER — CEPHALEXIN 500 MG/1
500 CAPSULE ORAL 2 TIMES DAILY
Qty: 14 CAPSULE | Refills: 0 | Status: SHIPPED | OUTPATIENT
Start: 2022-09-19 | End: 2022-09-26

## 2022-09-19 RX ORDER — DEXAMETHASONE SODIUM PHOSPHATE 4 MG/ML
INJECTION, SOLUTION INTRA-ARTICULAR; INTRALESIONAL; INTRAMUSCULAR; INTRAVENOUS; SOFT TISSUE PRN
Status: DISCONTINUED | OUTPATIENT
Start: 2022-09-19 | End: 2022-09-19

## 2022-09-19 RX ORDER — PROPOFOL 10 MG/ML
INJECTION, EMULSION INTRAVENOUS CONTINUOUS PRN
Status: DISCONTINUED | OUTPATIENT
Start: 2022-09-19 | End: 2022-09-19

## 2022-09-19 RX ORDER — FENTANYL CITRATE 0.05 MG/ML
25 INJECTION, SOLUTION INTRAMUSCULAR; INTRAVENOUS EVERY 5 MIN PRN
Status: DISCONTINUED | OUTPATIENT
Start: 2022-09-19 | End: 2022-09-20 | Stop reason: HOSPADM

## 2022-09-19 RX ORDER — HYDROMORPHONE HCL IN WATER/PF 6 MG/30 ML
0.2 PATIENT CONTROLLED ANALGESIA SYRINGE INTRAVENOUS EVERY 5 MIN PRN
Status: DISCONTINUED | OUTPATIENT
Start: 2022-09-19 | End: 2022-09-20 | Stop reason: HOSPADM

## 2022-09-19 RX ORDER — FENTANYL CITRATE 0.05 MG/ML
25 INJECTION, SOLUTION INTRAMUSCULAR; INTRAVENOUS
Status: DISCONTINUED | OUTPATIENT
Start: 2022-09-19 | End: 2022-09-20 | Stop reason: HOSPADM

## 2022-09-19 RX ADMIN — PROPOFOL 120 MCG/KG/MIN: 10 INJECTION, EMULSION INTRAVENOUS at 07:00

## 2022-09-19 RX ADMIN — ONDANSETRON 4 MG: 2 INJECTION INTRAMUSCULAR; INTRAVENOUS at 07:00

## 2022-09-19 RX ADMIN — FENTANYL CITRATE 50 MCG: 50 INJECTION, SOLUTION INTRAMUSCULAR; INTRAVENOUS at 07:01

## 2022-09-19 RX ADMIN — SODIUM CHLORIDE, SODIUM LACTATE, POTASSIUM CHLORIDE, CALCIUM CHLORIDE: 600; 310; 30; 20 INJECTION, SOLUTION INTRAVENOUS at 06:42

## 2022-09-19 RX ADMIN — PROPOFOL 20 MG: 10 INJECTION, EMULSION INTRAVENOUS at 07:03

## 2022-09-19 RX ADMIN — LIDOCAINE HYDROCHLORIDE 3 ML: 10 INJECTION, SOLUTION INFILTRATION; PERINEURAL at 07:01

## 2022-09-19 RX ADMIN — DEXAMETHASONE SODIUM PHOSPHATE 4 MG: 4 INJECTION, SOLUTION INTRA-ARTICULAR; INTRALESIONAL; INTRAMUSCULAR; INTRAVENOUS; SOFT TISSUE at 07:07

## 2022-09-19 RX ADMIN — GLYCOPYRROLATE 0.2 MG: 0.2 INJECTION, SOLUTION INTRAMUSCULAR; INTRAVENOUS at 06:59

## 2022-09-19 NOTE — DISCHARGE INSTRUCTIONS
If you have any questions or concerns regarding your procedure, please contact Dr. Chaves, his office number is [phone].    When to call your healthcare provider    Call your healthcare provider if:    Fever of 101.5 F (38.6 C) or higher  Bleeding from the site or sites that cease to stop  Persistent pain or a sudden increase in pain  Nausea with vomiting that does not ease after a few hours.  Abdominal pain or bloating  Fainting  You still have intolerable pain an hour after taking medicine. The medicine may not be strong enough.   You feel too sleepy, dizzy, or groggy. The medicine may be too strong.   You have side effects such as nausea or vomiting, or skin changes such as rash, itching, or hives. Your healthcare provider may suggest other medicines to control side effects.  Rash, itching, or hives may mean you have an allergic reaction. Report this right away. If you have trouble breathing or facial swelling, call 911 right away.    Coping with pain    *Pain after surgery is normal and expected*    If you have pain after surgery, pain medicine will help you feel better. Take it as told, before pain becomes severe. Also, ask your healthcare provider or pharmacist about other ways to control pain. This might be with heat, ice, or relaxation. And follow any other instructions your surgeon or nurse gives you.    Tips for taking pain medicine    To get the best relief possible, remember these points:    Pain medicines can upset your stomach. Taking them with a little food may help.  Most pain relievers taken by mouth need at least 20 to 30 minutes to start to work.  Don't wait till your pain becomes severe before you take your medicine. Try to time your medicine so that you can take it before starting an activity. This might be before you get dressed, go for a walk, or sit down for dinner.  Constipation is a common side effect of pain medicines. You can take medicines such as laxatives (Miralax) or stool  softeners to help ease constipation. Drinking lots of fluids and eating foods such as fruits and vegetables that are high in fiber can also help.  Drinking alcohol and taking pain medicine can cause dizziness and slow your breathing. It can even be deadly. Don't drink alcohol while taking pain medicine.  Pain medicine can make you react more slowly to things. Don't drive or run machinery while taking pain medicine.  Your healthcare provider may tell you to take acetaminophen to help ease your pain. Ask him or her how much you are supposed to take each day. Acetaminophen or other pain relievers may interact with your prescription medicines or other over-the-counter (OTC) medicines. Some prescription medicines have acetaminophen and other ingredients. Using both prescription and OTC acetaminophen for pain can cause you to overdose. Read the labels on your OTC medicines with care. This will help you to clearly know the list of ingredients, how much to take, and any warnings. It may also help you not take too much acetaminophen. If you have questions or don't understand the information, ask your pharmacist or healthcare provider to explain it to you before you take the OTC medicine.    Discharge Instructions: After Your Surgery, regarding Anesthesia    You ve just had surgery. During surgery, you were given medicine called anesthesia to keep you relaxed and free of pain. After surgery, you may have some pain or nausea. This is common. Here are some tips for feeling better and getting well after surgery.    Going home    Your healthcare provider will show you how to take care of yourself when you go home. He or she will also answer your questions. Have an adult family member or friend drive you home. For the first 24 hours after your surgery:    Don't drive or use heavy equipment.  Don't make important decisions or sign legal papers.  Don't drink alcohol.  Have an adult stay with you for the next 24 hours. He or she can  watch for problems and help keep you safe.  Be sure to go to all follow-up visits with your healthcare provider. And rest after your surgery for as long as your healthcare provider tells you to.    Managing nausea    Some people have an upset stomach after surgery. This is often because of anesthesia, pain, or pain medicine, or the stress of surgery. These tips will help you handle nausea and eat healthy foods as you get better. If you were on a special food plan before surgery, ask your healthcare provider if you should follow it while you get better. These tips may help:    Don't push yourself to eat. Your body will tell you when to eat and how much.  Start off with clear liquids and soup. They are easier to digest.  Next try semi-solid foods, such as mashed potatoes, applesauce, and gelatin, as you feel ready.  Slowly move to solid foods. Don t eat fatty, rich, or spicy foods at first.  Don't force yourself to have 3 large meals a day. Instead eat smaller amounts more often.  Take pain medicines with a small amount of solid food, such as crackers or toast, to prevent nausea.    If you have obstructive sleep apnea    You were given anesthesia medicine during surgery to keep you comfortable and free of pain. After surgery, you may have more apnea spells because of this medicine and other medicines you were given. The spells may last longer than usual.   At home:    Keep using the continuous positive airway pressure (CPAP) device when you sleep. Unless your healthcare provider tells you not to, use it when you sleep, day or night. CPAP is a common device used to treat obstructive sleep apnea.  Talk with your provider before taking any pain medicine, muscle relaxants, or sedatives. Your provider will tell you about the possible dangers of taking these medicines.

## 2022-09-19 NOTE — ANESTHESIA PREPROCEDURE EVALUATION
Anesthesia Pre-Procedure Evaluation    Patient: Otis Brumfield   MRN: [id number] : 1955        Procedure : Procedure(s):  CO2 laser ablation of wart right foot          Past Medical History:   Diagnosis Date     Benign neoplasm of colon     Created by Conversion      Diverticulosis of large intestine     Created by Conversion  Replacement Utility updated for latest IMO load     Hypertension      Hypertrophic and atrophic condition of skin     Created by Conversion  Replacement Utility updated for latest IMO load      Past Surgical History:   Procedure Laterality Date     HC REMOVAL OF TONSILS,<13 Y/O      Description: Tonsillectomy;  Recorded: 2007;     HERNIA REPAIR Left      REMOVAL OF SPERM DUCT(S)      Description: Surgery Of Male Genitalia Vasectomy;  Recorded: 2007;      No Known Allergies   Social History     Tobacco Use     Smoking status: Light Tobacco Smoker     Types: Cigars     Smokeless tobacco: Never Used     Tobacco comment: cigar occassionally   Substance Use Topics     Alcohol use: Yes     Alcohol/week: 3.0 - 4.0 standard drinks      Wt Readings from Last 1 Encounters:   22 99.4 kg (219 lb 3.2 oz)        Anesthesia Evaluation            ROS/MED HX  ENT/Pulmonary:     (+) recent URI,     Neurologic:  - neg neurologic ROS     Cardiovascular:     (+) hypertension-----    METS/Exercise Tolerance:     Hematologic:  - neg hematologic  ROS     Musculoskeletal:   (+) arthritis,     GI/Hepatic:  - neg GI/hepatic ROS     Renal/Genitourinary:  - neg Renal ROS     Endo:  - neg endo ROS     Psychiatric/Substance Use:  - neg psychiatric ROS     Infectious Disease:  - neg infectious disease ROS     Malignancy:  - neg malignancy ROS     Other:  - neg other ROS    (+) , H/O Chronic Pain,        Physical Exam    Airway        Mallampati: III   TM distance: > 3 FB   Neck ROM: full   Mouth opening: > 3 cm    Respiratory Devices and Support         Dental       (+) chipped      Cardiovascular    cardiovascular exam normal          Pulmonary   pulmonary exam normal                OUTSIDE LABS:  CBC: No results found for: WBC, HGB, HCT, PLT  BMP:   Lab Results   Component Value Date     09/01/2022     01/13/2021    POTASSIUM 4.2 09/01/2022    POTASSIUM 4.2 01/13/2021    CHLORIDE 104 09/01/2022    CHLORIDE 105 01/13/2021    CO2 26 09/01/2022    CO2 26 01/13/2021    BUN 18.8 09/01/2022    BUN 18 01/13/2021    CR 1.15 09/01/2022    CR 1.07 01/13/2021     (H) 09/01/2022    GLC 87 01/13/2021     COAGS: No results found for: PTT, INR, FIBR  POC: No results found for: BGM, HCG, HCGS  HEPATIC: No results found for: ALBUMIN, PROTTOTAL, ALT, AST, GGT, ALKPHOS, BILITOTAL, BILIDIRECT, MADDIE  OTHER:   Lab Results   Component Value Date    A1C 5.4 09/01/2022    ESTEBAN 10.1 09/01/2022       Anesthesia Plan    ASA Status:  2   NPO Status:  NPO Appropriate    Anesthesia Type: MAC.     - Reason for MAC: straight local not clinically adequate   Induction: N/a.   Maintenance: Balanced.        Consents    Anesthesia Plan(s) and associated risks, benefits, and realistic alternatives discussed. Questions answered and patient/representative(s) expressed understanding.     - Discussed: Risks, Benefits and Alternatives for BOTH SEDATION and the PROCEDURE were discussed     - Discussed with:  Patient      - Extended Intubation/Ventilatory Support Discussed: No.      - Patient is DNR/DNI Status: No    Use of blood products discussed: No .     Postoperative Care    Pain management: IV analgesics, Multi-modal analgesia.   PONV prophylaxis: Ondansetron (or other 5HT-3), Dexamethasone or Solumedrol     Comments:    Other Comments: MAC.  Patient aware this isn't GA and patient may recall things in the OR.            Jose Luis Martinez MD

## 2022-09-19 NOTE — ANESTHESIA CARE TRANSFER NOTE
Patient: Otis Brumfield    Procedure: Procedure(s):  CO2 laser ablation of wart right foot       Diagnosis: Plantar warts [B07.0]  Diagnosis Additional Information: No value filed.    Anesthesia Type:   MAC     Note:      Level of Consciousness: drowsy  Oxygen Supplementation: face mask  Level of Supplemental Oxygen (L/min / FiO2): 8  Independent Airway: airway patency satisfactory and stable  Dentition: dentition unchanged  Vital Signs Stable: post-procedure vital signs reviewed and stable  Report to RN Given: handoff report given  Patient transferred to: Phase II    Handoff Report: Identifed the Patient, Identified the Reponsible Provider, Reviewed the pertinent medical history, Discussed the surgical course, Reviewed Intra-OP anesthesia mangement and issues during anesthesia, Set expectations for post-procedure period and Allowed opportunity for questions and acknowledgement of understanding      Vitals:  Vitals Value Taken Time   BP 94/52 09/19/22 0722   Temp 36.2  C (97.2  F) 09/19/22 0722   Pulse 83 09/19/22 0723   Resp 16 09/19/22 0722   SpO2 94 % 09/19/22 0723   Vitals shown include unvalidated device data.    Electronically Signed By: NGHIA Mendes CRNA  September 19, 2022  7:27 AM

## 2022-09-19 NOTE — OP NOTE
Date of surgery: 9/19/2022    Surgeon: Rafita Chaves D.P.M.    Preoperative diagnosis: Verruca plantaris right  foot. 2 in number.    Postoperative diagnosis: Same    Procedure: CO2 laser ablation of wart right foot    Anesthesia: Mac    Medications: None    Complications: None    Specimen: None    Blood loss: None    Description: The patient was taken to the operating room and placed on the table in the supine position.  Under local anesthesia of 0.5% Marcaine plain and 1 right foot % Xylocaine plain in a one-to-one mixture along with IV sedation the right foot was prepped and draped in usual aseptic manner.  With the CO2 laser set at 8 W continuous the following procedure was performed.    Attention was directed to the plantar lateral aspect of the right foot where two large hyperkeratotic lesions were noted measuring approximately 2.5 cm x 2.5 cm each.  Utilizing the CO2 laser set at 8 W continuouseach lesion was circumscribed approximately 2 mm outside the border of the lesion.  The lesion was then ablated in a circular and crosshatched fashion with the britt being wiped away with a sterile saline soaked gauze.  Once each lesion had been treated the area was once again ablated in a circular and crosshatched fashion.  The laser was then  defocused and the area was coagulated.  The wounds were inspected to ensure the entire lesion had been treated, this was deemed be satisfactory.    A sterile dressing was then applied to the right foot comprising of Silvadene cream, and Telfa , 4 x 4 gauze, and Kerlix.  The patient appeared to tolerate the procedure and anesthesia well and was transported from the operating room to the recovery room with vital signs stable and neurovascular status intact area

## 2022-09-19 NOTE — ANESTHESIA POSTPROCEDURE EVALUATION
Patient: Otis Brumfield    Procedure: Procedure(s):  CO2 laser ablation of wart right foot       Anesthesia Type:  MAC    Note:  Disposition: Outpatient   Postop Pain Control: Uneventful            Sign Out: Well controlled pain   PONV: No   Neuro/Psych: Uneventful            Sign Out: Acceptable/Baseline neuro status   Airway/Respiratory: Uneventful            Sign Out: Acceptable/Baseline resp. status   CV/Hemodynamics: Uneventful            Sign Out: Acceptable CV status; No obvious hypovolemia; No obvious fluid overload   Other NRE: NONE   DID A NON-ROUTINE EVENT OCCUR? No    Event details/Postop Comments:  No issues.             Last vitals:  Vitals Value Taken Time   BP 97/55 09/19/22 0800   Temp 97.2  F (36.2  C) 09/19/22 0722   Pulse 61 09/19/22 0802   Resp 16 09/19/22 0722   SpO2 95 % 09/19/22 0802   Vitals shown include unvalidated device data.    Electronically Signed By: Jose Luis Martinez MD  September 19, 2022  9:44 AM

## 2022-09-25 ENCOUNTER — HEALTH MAINTENANCE LETTER (OUTPATIENT)
Age: 67
End: 2022-09-25

## 2022-09-27 ENCOUNTER — OFFICE VISIT (OUTPATIENT)
Dept: PODIATRY | Facility: CLINIC | Age: 67
End: 2022-09-27
Payer: COMMERCIAL

## 2022-09-27 VITALS — BODY MASS INDEX: 30.78 KG/M2 | HEIGHT: 70 IN | OXYGEN SATURATION: 99 % | WEIGHT: 215 LBS | HEART RATE: 74 BPM

## 2022-09-27 DIAGNOSIS — B07.0 PLANTAR WARTS: Primary | ICD-10-CM

## 2022-09-27 PROCEDURE — 99024 POSTOP FOLLOW-UP VISIT: CPT | Performed by: PODIATRIST

## 2022-09-27 ASSESSMENT — PAIN SCALES - GENERAL: PAINLEVEL: EXTREME PAIN (9)

## 2022-09-27 NOTE — PROGRESS NOTES
Subjective findings: The patient return to the clinic today for postop visit #1, 1 week status post CO2 laser ablation of 2 warts on the bottom of the right foot.  The patient is in good spirits but complains of moderate to severe pain.     Objective findings: The dressings have been removed.  The wound is granulating well.  There is no edema, erythema, cellulitis, drainage or bleeding noted.  Neurovascular status is intact.    Assessment verruca plantaris right foot    Plan: I recommended the patient to continue to clean the wound and apply Silvadene as needed and apply a Band-Aid.  He is to return to the clinic as needed.  He was told he wound will granulate and completely heal over the next 2 to 3 weeks.  He is to return to the clinic as needed.

## 2022-09-27 NOTE — LETTER
9/27/2022         RE: Otis Brumfield  [address]  [place] [address]        Dear Colleague,    Thank you for referring your patient, Otis Brumfield, to the [facility]. Please see a copy of my visit note below.    Subjective findings: The patient return to the clinic today for postop visit #1, 1 week status post CO2 laser ablation of 2 warts on the bottom of the right foot.  The patient is in good spirits he had no complaints.    Objective findings: The dressings have been removed.  The wound is granulating well.  There is no edema, erythema, cellulitis, drainage or bleeding noted.  Neurovascular status is intact.    Assessment verruca plantaris right foot    Plan: Admit directed the patient to continue to clean the wound and apply Silvadene as needed.  He is to return to the clinic as needed.  He was told he wound will granulate and completely heal over the next 2 to 3 weeks.  He is to return to the clinic as needed.      Again, thank you for allowing me to participate in the care of your patient.        Sincerely,        Rafita Chaves DPM

## 2022-10-17 ENCOUNTER — OFFICE VISIT (OUTPATIENT)
Dept: FAMILY MEDICINE | Facility: CLINIC | Age: 67
End: 2022-10-17
Payer: COMMERCIAL

## 2022-10-17 VITALS
DIASTOLIC BLOOD PRESSURE: 62 MMHG | WEIGHT: 219.9 LBS | HEART RATE: 84 BPM | SYSTOLIC BLOOD PRESSURE: 132 MMHG | BODY MASS INDEX: 31.55 KG/M2 | RESPIRATION RATE: 20 BRPM | OXYGEN SATURATION: 98 %

## 2022-10-17 DIAGNOSIS — J06.9 VIRAL URI WITH COUGH: Primary | ICD-10-CM

## 2022-10-17 PROCEDURE — 90662 IIV NO PRSV INCREASED AG IM: CPT | Performed by: NURSE PRACTITIONER

## 2022-10-17 PROCEDURE — 99213 OFFICE O/P EST LOW 20 MIN: CPT | Mod: 25 | Performed by: NURSE PRACTITIONER

## 2022-10-17 PROCEDURE — 90750 HZV VACC RECOMBINANT IM: CPT | Performed by: NURSE PRACTITIONER

## 2022-10-17 PROCEDURE — G0008 ADMIN INFLUENZA VIRUS VAC: HCPCS | Mod: 59 | Performed by: NURSE PRACTITIONER

## 2022-10-17 PROCEDURE — 90472 IMMUNIZATION ADMIN EACH ADD: CPT | Performed by: NURSE PRACTITIONER

## 2022-10-17 NOTE — PROGRESS NOTES
Assessment and Plan:     Viral URI with cough  Differentials include COVID-19, influenza, strep pharyngitis, other viral illnesses, bronchitis, pneumonia.  Patient's symptoms have improved suggestive of viral illness.  He had a negative at-home COVID test yesterday.  The sore throat has dissipated.  He is afebrile.  Lungs are clear to auscultation.  Discussed symptomatic treatment for fever or discomfort with over-the-counter acetaminophen or ibuprofen as needed.  Recommend using over-the-counter nasal saline sprays to assist with sinus congestion and any postnasal drainage.  If no improvement in symptoms, he is to follow with Dr. Posadas.  He is content with the plan.    Patient requested shingles and influenza vaccines today.  He feels as though his symptoms have improved enough that he is comfortable receiving the vaccines.      Subjective:     Otis is a 67 year old male presenting to the clinic for concerns for cold symptoms for 7 days.  Patient has been experiencing sinus congestion, postnasal drainage, productive cough.  He denies shortness of breath, chest tightness, wheezing.  He did have a sore throat over the weekend.  He denies fever, stomachache, nausea, vomiting, diarrhea.  He has not lost his sense of smell or taste.  He has been taking over-the-counter NyQuil, DayQuil, and using cough drops.  He had a negative at-home COVID test yesterday.  His wife is also ill.    [employer] Systems: A complete 14 point review of systems was obtained and is negative or as stated in the history of present illness.    Social History     Socioeconomic History     Marital status:      Spouse name: Not on file     Number of children: Not on file     Years of education: Not on file     Highest education level: Not on file   Occupational History     Not on file   Tobacco Use     Smoking status: Light Smoker     Types: Cigars     Smokeless tobacco: Never     Tobacco comments:     cigar occassionally   Substance and  Sexual Activity     Alcohol use: Yes     Alcohol/week: 3.0 - 4.0 standard drinks     Drug use: No     Sexual activity: Not on file   Other Topics Concern     Not on file   Social History Narrative     Not on file     Social Determinants of Health     Financial Resource Strain: Not on file   Food Insecurity: Not on file   Transportation Needs: Not on file   Physical Activity: Not on file   Stress: Not on file   Social Connections: Not on file   Intimate Partner Violence: Not on file   Housing Stability: Not on file       Active Ambulatory Problems     Diagnosis Date Noted     Benign Polyps Of The Large Intestine      Diverticulosis      Hypertension      Acrochordon      Acute Meniscal Tear      Joint Pain, Localized In The Right Shoulder      Pneumonia 06/25/2018     Hypertensive heart disease without heart failure 08/20/2019     Plantar warts 08/10/2022     Cervical spinal cord compression (H) 09/01/2022     Resolved Ambulatory Problems     Diagnosis Date Noted     No Resolved Ambulatory Problems     No Additional Past Medical History       Family History   Problem Relation Age of Onset     Colon Cancer Father      Diabetes Mother      Diabetes Brother        Objective:     /62 (BP Location: Right arm, Patient Position: Sitting, Cuff Size: Adult Regular)   Pulse 84   Resp 20   Wt 99.7 kg (219 lb 14.4 oz)   SpO2 98%   BMI 31.55 kg/m      Patient is alert, in no obvious distress.   Skin: Warm, dry.  No lesions or rashes.  Skin turgor rapid return.   HEENT:  Head normocephalic, atraumatic.  Eyes normal.  Ears normal.  Nose patent, mucosa pink.  Oropharynx mucosa pink.  No lesions or tonsillar enlargement.   Neck: Supple, no lymphadenopathy.   Lungs:  Clear to auscultation. Respirations even and unlabored.  No wheezing or rales noted.   Heart:  Regular rate and rhythm.  No murmurs.

## 2022-11-18 ENCOUNTER — OFFICE VISIT (OUTPATIENT)
Dept: FAMILY MEDICINE | Facility: CLINIC | Age: 67
End: 2022-11-18
Payer: COMMERCIAL

## 2022-11-18 VITALS
OXYGEN SATURATION: 98 % | DIASTOLIC BLOOD PRESSURE: 98 MMHG | WEIGHT: 217.9 LBS | HEART RATE: 91 BPM | BODY MASS INDEX: 31.27 KG/M2 | RESPIRATION RATE: 16 BRPM | TEMPERATURE: 98.6 F | SYSTOLIC BLOOD PRESSURE: 164 MMHG

## 2022-11-18 DIAGNOSIS — M54.2 NECK PAIN: Primary | ICD-10-CM

## 2022-11-18 PROCEDURE — 99214 OFFICE O/P EST MOD 30 MIN: CPT | Mod: 25 | Performed by: NURSE PRACTITIONER

## 2022-11-18 PROCEDURE — 96372 THER/PROPH/DIAG INJ SC/IM: CPT | Performed by: NURSE PRACTITIONER

## 2022-11-18 RX ORDER — CYCLOBENZAPRINE HCL 5 MG
5 TABLET ORAL 3 TIMES DAILY PRN
Qty: 25 TABLET | Refills: 0 | Status: SHIPPED | OUTPATIENT
Start: 2022-11-18 | End: 2022-12-01

## 2022-11-18 RX ORDER — PREDNISONE 20 MG/1
40 TABLET ORAL DAILY
Qty: 10 TABLET | Refills: 0 | Status: SHIPPED | OUTPATIENT
Start: 2022-11-18 | End: 2022-11-23

## 2022-11-18 RX ORDER — KETOROLAC TROMETHAMINE 30 MG/ML
30 INJECTION, SOLUTION INTRAMUSCULAR; INTRAVENOUS ONCE
Status: COMPLETED | OUTPATIENT
Start: 2022-11-18 | End: 2022-11-18

## 2022-11-18 RX ORDER — OXYCODONE AND ACETAMINOPHEN 5; 325 MG/1; MG/1
1 TABLET ORAL EVERY 6 HOURS PRN
Qty: 12 TABLET | Refills: 0 | Status: SHIPPED | OUTPATIENT
Start: 2022-11-18 | End: 2022-11-18

## 2022-11-18 RX ORDER — OXYCODONE AND ACETAMINOPHEN 5; 325 MG/1; MG/1
1 TABLET ORAL EVERY 6 HOURS PRN
Qty: 12 TABLET | Refills: 0 | Status: SHIPPED | OUTPATIENT
Start: 2022-11-18 | End: 2022-12-01

## 2022-11-18 RX ADMIN — KETOROLAC TROMETHAMINE 30 MG: 30 INJECTION, SOLUTION INTRAMUSCULAR; INTRAVENOUS at 19:29

## 2022-11-18 ASSESSMENT — ENCOUNTER SYMPTOMS
NUMBNESS: 0
FEVER: 0
PARESTHESIAS: 0
WEAKNESS: 0

## 2022-11-19 NOTE — PATIENT INSTRUCTIONS
Ice pack tonight then heat tomorrow     Toradol tonight     Start prednisone tomorrow morning with food     Percocet for severe pain, german at night     Muscle relaxers     Spine clinic as soon as possible or primary care - next week     To ER with uncontrolled pain, numbness/weakness in arms.

## 2022-11-19 NOTE — PROGRESS NOTES
Assessment & Plan     Neck pain    - Spine  Referral  - predniSONE (DELTASONE) 20 MG tablet  Dispense: 10 tablet; Refill: 0  - ketorolac (TORADOL) injection 30 mg  - cyclobenzaprine (FLEXERIL) 5 MG tablet  Dispense: 25 tablet; Refill: 0  - oxyCODONE-acetaminophen (PERCOCET) 5-325 MG tablet  Dispense: 12 tablet; Refill: 0     Patient who slept sitting up by accident with his head in a weird position awoke with left lateral neck pain which has worsened over the last 3 days and spread to the entire neck severely restricting range of motion.  No radiculopathy.  No weakness.  No numbness or tingling.    Has seen neurosurgery in the past and attended physical therapy with improvement of symptoms.  Does have intermittent issues with neck pain.  Upon review of neurosurgery note from February 9, 2021, in association with neck pain radiating down the left arm, he was diagnosed with multilevel cervical spondylosis, cervical degenerative disc disease, cervical spondylolisthesis, severe central canal stenosis with cord compression who has signs and symptoms of left cervical radiculopathy (most consistent with C5).  Surgery was recommended, but patient declined pending outcome of PT which was successful    Given this history, did aggressively treat his discomfort with prednisone burst starting tomorrow morning, Toradol tonight, Flexeril and Percocet as needed.  No driving or drinking alcohol with Flexeril or Percocet discussed.  Spine clinic referral.  Red flag symptoms discussed including unrelenting severe pain, radiating pain down arms, weakness, numbness or tingling.  Recommended coming back if these occur or going to emergency room.              Return in about 5 days (around 11/23/2022).    No name on file  [facility] ELVA Monae is a 67 year old male who presents to clinic today for the following health issues:  Chief Complaint   Patient presents with     Neck Pain  "    HPI    Fell asleep sitting up 3 days ago.  \"crick\" in left neck after this. Getting worse and now radiating around.      Pain number is 9/10.      Tried advil/tylenol/ice/warm/biofreeze.      Advil yesterday  Tylenol this morning.      Had similar 1 year ago and got so bad that he got an MRI and went to PT.  Did well with home exercises.      IMPRESSION:  1.  Multilevel degenerative disc disease of the cervical spine with disc osteophyte complexes, most pronounced at the C3/C4 level where there is streak spinal canal narrowing (AP diameter the spinal canal measuring 5 mm) with compression of the cervical      spinal cord without evidence of edema or myelomalacia.  2.  Multilevel uncovertebral joint disease and facet arthropathy with severe multilevel neural foraminal narrowing at C3/C4-C6/C7 as described above.      Review of Systems   Constitutional: Negative for fever.   Neurological: Negative for weakness, numbness and paresthesias.           Objective    BP (!) 164/98 (BP Location: Right arm, Patient Position: Sitting, Cuff Size: Adult Regular)   Pulse 91   Temp 98.6  F (37  C) (Oral)   Resp 16   Wt 98.8 kg (217 lb 14.4 oz)   SpO2 98%   BMI 31.27 kg/m    Physical Exam  Constitutional:       Appearance: He is well-developed and well-nourished.   HENT:      Right Ear: External ear normal.      Left Ear: External ear normal.   Eyes:      General:         Right eye: No discharge.         Left eye: No discharge.      Conjunctiva/sclera: Conjunctivae normal.   Neck:      Comments: Able to sit at neutral. Can't look up, able to look down and nearly touch his chin to his chest.  About 50% of normal ear to shoulder bilaterally.    Cardiovascular:      Pulses: Normal pulses and intact distal pulses.   Pulmonary:      Effort: Pulmonary effort is normal.   Musculoskeletal:         General: No tenderness (Feels better when I touch neck muscles.). Normal range of motion.      Cervical back: Rigidity present. No " tenderness (No C-spine tenderness.).   Skin:     General: Skin is warm.   Neurological:      Mental Status: He is alert and oriented to person, place, and time.      Sensory: No sensory deficit.      Motor: No weakness.      Coordination: Coordination normal.   Psychiatric:         Mood and Affect: Mood and affect and mood normal.         Behavior: Behavior normal.         Thought Content: Thought content normal.         Judgment: Judgment normal.

## 2022-11-28 ASSESSMENT — ENCOUNTER SYMPTOMS
DIARRHEA: 0
DYSURIA: 0
HEMATURIA: 0
HEARTBURN: 0
JOINT SWELLING: 0
ARTHRALGIAS: 0
WEAKNESS: 0
SHORTNESS OF BREATH: 0
FREQUENCY: 0
NERVOUS/ANXIOUS: 0
FEVER: 0
SORE THROAT: 0
COUGH: 0
HEADACHES: 0
CHILLS: 0
EYE PAIN: 0
PALPITATIONS: 0
MYALGIAS: 0
NAUSEA: 0
ABDOMINAL PAIN: 0
PARESTHESIAS: 0
CONSTIPATION: 0
DIZZINESS: 0
HEMATOCHEZIA: 0

## 2022-11-28 ASSESSMENT — ACTIVITIES OF DAILY LIVING (ADL): CURRENT_FUNCTION: NO ASSISTANCE NEEDED

## 2022-11-29 NOTE — PROGRESS NOTES
Assessment:   Otis Brumfield is a 67 year old y.o. male with past medical history significant for hypertension who presents today for follow-up regarding a flare of chronic neck pain without radicular symptoms.  Flare began 2 weeks ago after he accidentally fell asleep sitting up on the couch.  Flare is almost completely resolved after a Toradol injection, 5-day course of prednisone, and short course of hydrocodone/acetaminophen and cyclobenzaprine.  An MRI cervical spine from January 2021 showed multilevel cervical spondylosis most pronounced at C3-4 where there is severe spinal stenosis.  There is also moderate spinal canal stenosis C5-6 and multilevel high-grade bilateral foraminal stenosis C3-4 through C6-7.  Patient saw Dr. Hylton February 2021.  She recommended a C3-C6 ACDF.  Patient opted for physical therapy instead which was very helpful.  He had been doing very well up until this flare.  He is neurologically intact.  He does not have any signs or symptoms of cervical myelopathy.       Plan:     A shared decision making plan was used.  The patient's values and choices were respected.  The following represents what was discussed and decided upon by the physician assistant and the patient.      1.  DIAGNOSTIC TESTS:    -I reviewed the MRI cervical spine from January 2021.  - I reviewed the cervical spine flexion-extension x-rays.  -No additional diagnostic tests were ordered.  Repeating the MRI cervical spine would not change our plan at this time.  He is neurologically intact.  He does not have any signs or symptoms of cervical myelopathy.  His flare has almost completely resolved.  If pain flares back up again or if he would like to reconsider surgery and have a consultation with the neurosurgeon again I would recommend obtaining an updated MRI.    2.  PHYSICAL THERAPY: Patient completed 7 sessions of physical therapy April 1, 2021.  This was very helpful for him.  He remember some home exercises but not  all.  I will have the physical therapy team send him his home exercises by mail.    3.  MEDICATIONS: No changes are made to the patient's medications.  He is not currently taking any pain relief medications.  - Patient felt that the Toradol injection was the most helpful thing for him when he had his flare.  - Patient completed a 5-day course of prednisone 40 mg daily.  - Patient completed his limited quantity of oxycodone/acetaminophen.  - Patient has some cyclobenzaprine left over but he does not feel like he needs it anymore.    4.  INTERVENTIONS: No interventions were ordered.  If pain were to flare back up, we could consider a C7-T1 interlaminar epidural steroid injection.    5.  PATIENT EDUCATION: Patient states that at this point he is not ready to consider surgery.  However, if he continues to have painful flares he would reconsider.    6.  FOLLOW-UP: Patient going to follow-up with me as needed.  If he has any questions or concerns or if he has another flare of pain, he should not hesitate to call.    Subjective:     Otis Brumfield is a 67 year old male who presents today for follow-up regarding a flare of chronic neck pain.  This patient has known severe cervical spinal stenosis.  He had a consultation with Dr. Hylton from neurosurgery in February 2021 and she recommended a C3-C6 ACDF.  Patient reports that he opted to try physical therapy instead of moving forward with surgery.  The physical therapy was very helpful.  He continued to have some intermittent neck stiffness but he did manage this with stretches.  He did well up until 2 weeks ago when his pain flared up.  He states that he accidentally fell asleep sleeping on the couch with his head side bent to the left.  When he woke up he felt a kink in the neck.  The pain got progressively worse over the next couple of days.  He states the pain was so severe he was almost in tears.  He states this was worse than the pain he had in January 2021 and the  "pain was \"  To bobbling.\"  He had to physically hold onto his head with his hands to get in and out of bed and to roll over.  He went to urgent care and got a Toradol injection which was incredibly helpful.  They also prescribed a 5-day course of prednisone which she completed and a limited quantity of cyclobenzaprine and oxycodone/acetaminophen.  Medications were helpful and he states that his pain is almost completely back to baseline.    Patient complains of neck pain.  When this pain first started was on the left neck and then was located in the middle and radiated bilaterally.  At the end of the pain it was more on the right side.  He never had any pain radiating down the arms.  He did have mild headache associated with the pain but he thinks it also could have been due to the pain relieving medications.  He never had any numbness, tingling, weakness down the arms.  He never had any loss of bowel or bladder control or difficulty emptying his bladder.  He did not have any difficulty with fine motor activities or balance problems.  No fevers, chills, sweats.  He rates his pain today as a 1-10.  Pain tends to get worse as the day goes on, especially if he is upright for long periods of time.  Pain is alleviated with lying down.    Patient completed 7 sessions of physical therapy April 1, 2021.  He does some of his home exercises but admits he does not remember them all.  He takes ibuprofen as needed which is helpful.    Review of Systems:  Positive for headache.  Negative for numbness/tingling, weakness, loss of bowel/bladder control, inability to urinate, pain much worse at night, trip/stumble/falls, difficulty swallowing, difficulty with hand skills, fevers, unintentional weight loss.     Objective:   CONSTITUTIONAL:  Vital signs as above.  No acute distress.  The patient is well nourished and well groomed.    PSYCHIATRIC:  The patient is awake, alert, oriented to person, place and time.  The patient is " answering questions appropriately with clear speech.  Normal affect.  HEENT: Normocephalic, atraumatic.  Sclera clear.    LYMPH: No cervical lymphadenopathy  SKIN:  Skin over the face, neck bilateral upper extremities is clean, dry, intact without rashes.  MUSCULOSKELETAL: The patient has 5/5 strength for the bilateral shoulder abductors, elbow flexors/extensors, wrist extensors, finger flexors/abductors.  No tenderness palpation bilateral cervical paraspinous muscles or upper trapezius muscles.  Cervical flexion intact.  Cervical extension mildly restricted.  Lateral rotation right mildly restricted.  Lateral rotation left moderately restricted.  NEUROLOGICAL: 1-2+ biceps, brachioradialis, triceps reflexes bilaterally.  Negative Tipton's bilaterally.  Sensation to light touch is intact bilateral upper extremities throughout.    RESULTS: I reviewed the MRI cervical spine from [facility] dated January 29, 2021.  This shows multilevel degenerative disc disease with disc osteophyte complex most pronounced at C3-4 where there is severe spinal stenosis with compression of the spinal cord.  There is no evidence of edema or myelomalacia.  There is also severe foraminal stenosis C3-4 through C6-7.    EXAM: XR CERVICAL SPINE 4 - 5 VWS  LOCATION: [facility]  DATE/TIME: 2/9/2021 8:51 AM     INDICATION: Cervicalgia.  COMPARISON: Cervical spine MRI 01/29/2021.  TECHNIQUE: CR Cervical Spine.     IMPRESSION:  Cervical levels seen to C6-C7 on the lateral views. Levels below this are obscured by overlying shoulder artifact. 1 mm retrolisthesis C2 on C3 on the neutral view reduces on flexion and is stable on extension. 3 mm retrolisthesis C3 on C4   reduces to 2 mm on flexion and is relatively stable on extension. 1.5 mm anterolisthesis C4 on C5 increases to 2 mm on flexion and reduces to 1 mm on extension. No other subluxation. Marked loss of disc space height C3-C4, C5-C6, and C6-C7. Multilevel   facet  arthropathy. The odontoid process is grossly normal. Visualized lungs are clear.

## 2022-12-01 ENCOUNTER — OFFICE VISIT (OUTPATIENT)
Dept: PHYSICAL MEDICINE AND REHAB | Facility: CLINIC | Age: 67
End: 2022-12-01
Attending: NURSE PRACTITIONER
Payer: COMMERCIAL

## 2022-12-01 VITALS
DIASTOLIC BLOOD PRESSURE: 92 MMHG | HEIGHT: 70 IN | WEIGHT: 224.7 LBS | BODY MASS INDEX: 32.17 KG/M2 | HEART RATE: 107 BPM | SYSTOLIC BLOOD PRESSURE: 149 MMHG

## 2022-12-01 DIAGNOSIS — M48.02 SPINAL STENOSIS IN CERVICAL REGION: Primary | ICD-10-CM

## 2022-12-01 PROCEDURE — 99213 OFFICE O/P EST LOW 20 MIN: CPT | Performed by: PHYSICIAN ASSISTANT

## 2022-12-01 ASSESSMENT — PAIN SCALES - GENERAL: PAINLEVEL: NO PAIN (1)

## 2022-12-01 NOTE — PATIENT INSTRUCTIONS
I believe you had a flareup of your pain because of the way you fell asleep.  You have severe narrowing around her spinal cord and when your neck was in that position, it caused an inflammatory response with subsequent muscle spasms.  It is very reassuring to me that your pain has already improved significantly.  Your exam was reassuring.  At this point I do not think it is necessary to get a new MRI of your cervical spine, but if your pain flares back up or if you want to pursue a follow-up with a spine surgeon to discuss her surgical options again, we should get a new MRI.    I will ask the physical therapist to send you your home exercise program.    If you have any questions or concerns, please send me a [facility] message or call our nurse line at [phone].

## 2022-12-01 NOTE — LETTER
12/1/2022         RE: Otis Brumfield  [address]  [place] [address]        Dear Colleague,    Thank you for referring your patient, Otis Brumfield, to the [facility]. Please see a copy of my visit note below.    Assessment:   Otis Brumfield is a 67 year old y.o. male with past medical history significant for hypertension who presents today for follow-up regarding a flare of chronic neck pain without radicular symptoms.  Flare began 2 weeks ago after he accidentally fell asleep sitting up on the couch.  Flare is almost completely resolved after a Toradol injection, 5-day course of prednisone, and short course of hydrocodone/acetaminophen and cyclobenzaprine.  An MRI cervical spine from January 2021 showed multilevel cervical spondylosis most pronounced at C3-4 where there is severe spinal stenosis.  There is also moderate spinal canal stenosis C5-6 and multilevel high-grade bilateral foraminal stenosis C3-4 through C6-7.  Patient saw Dr. Hylton February 2021.  She recommended a C3-C6 ACDF.  Patient opted for physical therapy instead which was very helpful.  He had been doing very well up until this flare.  He is neurologically intact.  He does not have any signs or symptoms of cervical myelopathy.       Plan:     A shared decision making plan was used.  The patient's values and choices were respected.  The following represents what was discussed and decided upon by the physician assistant and the patient.      1.  DIAGNOSTIC TESTS:    -I reviewed the MRI cervical spine from January 2021.  - I reviewed the cervical spine flexion-extension x-rays.  -No additional diagnostic tests were ordered.  Repeating the MRI cervical spine would not change our plan at this time.  He is neurologically intact.  He does not have any signs or symptoms of cervical myelopathy.  His flare has almost completely resolved.  If pain flares back up again or if he would like to reconsider surgery and  have a consultation with the neurosurgeon again I would recommend obtaining an updated MRI.    2.  PHYSICAL THERAPY: Patient completed 7 sessions of physical therapy April 1, 2021.  This was very helpful for him.  He remember some home exercises but not all.  I will have the physical therapy team send him his home exercises by mail.    3.  MEDICATIONS: No changes are made to the patient's medications.  He is not currently taking any pain relief medications.  - Patient felt that the Toradol injection was the most helpful thing for him when he had his flare.  - Patient completed a 5-day course of prednisone 40 mg daily.  - Patient completed his limited quantity of oxycodone/acetaminophen.  - Patient has some cyclobenzaprine left over but he does not feel like he needs it anymore.    4.  INTERVENTIONS: No interventions were ordered.  If pain were to flare back up, we could consider a C7-T1 interlaminar epidural steroid injection.    5.  PATIENT EDUCATION: Patient states that at this point he is not ready to consider surgery.  However, if he continues to have painful flares he would reconsider.    6.  FOLLOW-UP: Patient going to follow-up with me as needed.  If he has any questions or concerns or if he has another flare of pain, he should not hesitate to call.    Subjective:     Otis Brumfield is a 67 year old male who presents today for follow-up regarding a flare of chronic neck pain.  This patient has known severe cervical spinal stenosis.  He had a consultation with Dr. Hylton from neurosurgery in February 2021 and she recommended a C3-C6 ACDF.  Patient reports that he opted to try physical therapy instead of moving forward with surgery.  The physical therapy was very helpful.  He continued to have some intermittent neck stiffness but he did manage this with stretches.  He did well up until 2 weeks ago when his pain flared up.  He states that he accidentally fell asleep sleeping on the couch with his head side bent  "to the left.  When he woke up he felt a kink in the neck.  The pain got progressively worse over the next couple of days.  He states the pain was so severe he was almost in tears.  He states this was worse than the pain he had in January 2021 and the pain was \"  To bobbling.\"  He had to physically hold onto his head with his hands to get in and out of bed and to roll over.  He went to urgent care and got a Toradol injection which was incredibly helpful.  They also prescribed a 5-day course of prednisone which she completed and a limited quantity of cyclobenzaprine and oxycodone/acetaminophen.  Medications were helpful and he states that his pain is almost completely back to baseline.    Patient complains of neck pain.  When this pain first started was on the left neck and then was located in the middle and radiated bilaterally.  At the end of the pain it was more on the right side.  He never had any pain radiating down the arms.  He did have mild headache associated with the pain but he thinks it also could have been due to the pain relieving medications.  He never had any numbness, tingling, weakness down the arms.  He never had any loss of bowel or bladder control or difficulty emptying his bladder.  He did not have any difficulty with fine motor activities or balance problems.  No fevers, chills, sweats.  He rates his pain today as a 1-10.  Pain tends to get worse as the day goes on, especially if he is upright for long periods of time.  Pain is alleviated with lying down.    Patient completed 7 sessions of physical therapy April 1, 2021.  He does some of his home exercises but admits he does not remember them all.  He takes ibuprofen as needed which is helpful.    Review of Systems:  Positive for headache.  Negative for numbness/tingling, weakness, loss of bowel/bladder control, inability to urinate, pain much worse at night, trip/stumble/falls, difficulty swallowing, difficulty with hand skills, fevers, " unintentional weight loss.     Objective:   CONSTITUTIONAL:  Vital signs as above.  No acute distress.  The patient is well nourished and well groomed.    PSYCHIATRIC:  The patient is awake, alert, oriented to person, place and time.  The patient is answering questions appropriately with clear speech.  Normal affect.  HEENT: Normocephalic, atraumatic.  Sclera clear.    LYMPH: No cervical lymphadenopathy  SKIN:  Skin over the face, neck bilateral upper extremities is clean, dry, intact without rashes.  MUSCULOSKELETAL: The patient has 5/5 strength for the bilateral shoulder abductors, elbow flexors/extensors, wrist extensors, finger flexors/abductors.  No tenderness palpation bilateral cervical paraspinous muscles or upper trapezius muscles.  Cervical flexion intact.  Cervical extension mildly restricted.  Lateral rotation right mildly restricted.  Lateral rotation left moderately restricted.  NEUROLOGICAL: 1-2+ biceps, brachioradialis, triceps reflexes bilaterally.  Negative Tipton's bilaterally.  Sensation to light touch is intact bilateral upper extremities throughout.    RESULTS: I reviewed the MRI cervical spine from [facility] dated January 29, 2021.  This shows multilevel degenerative disc disease with disc osteophyte complex most pronounced at C3-4 where there is severe spinal stenosis with compression of the spinal cord.  There is no evidence of edema or myelomalacia.  There is also severe foraminal stenosis C3-4 through C6-7.    EXAM: XR CERVICAL SPINE 4 - 5 VWS  LOCATION: [facility]  DATE/TIME: 2/9/2021 8:51 AM     INDICATION: Cervicalgia.  COMPARISON: Cervical spine MRI 01/29/2021.  TECHNIQUE: CR Cervical Spine.     IMPRESSION:  Cervical levels seen to C6-C7 on the lateral views. Levels below this are obscured by overlying shoulder artifact. 1 mm retrolisthesis C2 on C3 on the neutral view reduces on flexion and is stable on extension. 3 mm retrolisthesis C3 on C4   reduces to 2  mm on flexion and is relatively stable on extension. 1.5 mm anterolisthesis C4 on C5 increases to 2 mm on flexion and reduces to 1 mm on extension. No other subluxation. Marked loss of disc space height C3-C4, C5-C6, and C6-C7. Multilevel   facet arthropathy. The odontoid process is grossly normal. Visualized lungs are clear.           Again, thank you for allowing me to participate in the care of your patient.        Sincerely,        Keri Anne PA-C

## 2022-12-02 ENCOUNTER — OFFICE VISIT (OUTPATIENT)
Dept: FAMILY MEDICINE | Facility: CLINIC | Age: 67
End: 2022-12-02
Attending: FAMILY MEDICINE
Payer: COMMERCIAL

## 2022-12-02 VITALS
DIASTOLIC BLOOD PRESSURE: 90 MMHG | SYSTOLIC BLOOD PRESSURE: 130 MMHG | OXYGEN SATURATION: 98 % | HEIGHT: 69 IN | WEIGHT: 221 LBS | RESPIRATION RATE: 14 BRPM | HEART RATE: 100 BPM | BODY MASS INDEX: 32.73 KG/M2

## 2022-12-02 DIAGNOSIS — K57.30 DIVERTICULOSIS OF LARGE INTESTINE WITHOUT HEMORRHAGE: ICD-10-CM

## 2022-12-02 DIAGNOSIS — E66.811 CLASS 1 OBESITY DUE TO EXCESS CALORIES WITH SERIOUS COMORBIDITY AND BODY MASS INDEX (BMI) OF 32.0 TO 32.9 IN ADULT: ICD-10-CM

## 2022-12-02 DIAGNOSIS — Z12.5 SCREENING FOR PROSTATE CANCER: ICD-10-CM

## 2022-12-02 DIAGNOSIS — E66.09 CLASS 1 OBESITY DUE TO EXCESS CALORIES WITH SERIOUS COMORBIDITY AND BODY MASS INDEX (BMI) OF 32.0 TO 32.9 IN ADULT: ICD-10-CM

## 2022-12-02 DIAGNOSIS — Z00.00 ENCOUNTER FOR MEDICARE ANNUAL WELLNESS EXAM: Primary | ICD-10-CM

## 2022-12-02 DIAGNOSIS — Z11.59 NEED FOR HEPATITIS C SCREENING TEST: ICD-10-CM

## 2022-12-02 DIAGNOSIS — I10 ESSENTIAL HYPERTENSION, BENIGN: ICD-10-CM

## 2022-12-02 DIAGNOSIS — Z23 HIGH PRIORITY FOR 2019-NCOV VACCINE: ICD-10-CM

## 2022-12-02 DIAGNOSIS — R73.01 IMPAIRED FASTING GLUCOSE: ICD-10-CM

## 2022-12-02 DIAGNOSIS — D12.6 TUBULAR ADENOMA OF COLON: ICD-10-CM

## 2022-12-02 LAB
ANION GAP SERPL CALCULATED.3IONS-SCNC: 16 MMOL/L (ref 7–15)
BUN SERPL-MCNC: 18.1 MG/DL (ref 8–23)
CALCIUM SERPL-MCNC: 10.6 MG/DL (ref 8.8–10.2)
CHLORIDE SERPL-SCNC: 106 MMOL/L (ref 98–107)
CHOLEST SERPL-MCNC: 165 MG/DL
CREAT SERPL-MCNC: 1.12 MG/DL (ref 0.67–1.17)
DEPRECATED HCO3 PLAS-SCNC: 23 MMOL/L (ref 22–29)
GFR SERPL CREATININE-BSD FRML MDRD: 72 ML/MIN/1.73M2
GLUCOSE SERPL-MCNC: 102 MG/DL (ref 70–99)
HDLC SERPL-MCNC: 42 MG/DL
HOLD SPECIMEN: NORMAL
LDLC SERPL CALC-MCNC: 99 MG/DL
NONHDLC SERPL-MCNC: 123 MG/DL
POTASSIUM SERPL-SCNC: 4.9 MMOL/L (ref 3.4–5.3)
PSA SERPL-MCNC: 1.41 NG/ML (ref 0–4.5)
SODIUM SERPL-SCNC: 145 MMOL/L (ref 136–145)
TRIGL SERPL-MCNC: 122 MG/DL

## 2022-12-02 PROCEDURE — 36415 COLL VENOUS BLD VENIPUNCTURE: CPT | Performed by: FAMILY MEDICINE

## 2022-12-02 PROCEDURE — 0124A COVID-19 VACCINE BIVALENT BOOSTER 12+ (PFIZER): CPT | Performed by: FAMILY MEDICINE

## 2022-12-02 PROCEDURE — 91312 COVID-19 VACCINE BIVALENT BOOSTER 12+ (PFIZER): CPT | Performed by: FAMILY MEDICINE

## 2022-12-02 PROCEDURE — G0439 PPPS, SUBSEQ VISIT: HCPCS | Performed by: FAMILY MEDICINE

## 2022-12-02 PROCEDURE — G0103 PSA SCREENING: HCPCS | Performed by: FAMILY MEDICINE

## 2022-12-02 PROCEDURE — 80048 BASIC METABOLIC PNL TOTAL CA: CPT | Performed by: FAMILY MEDICINE

## 2022-12-02 PROCEDURE — 99214 OFFICE O/P EST MOD 30 MIN: CPT | Mod: 25 | Performed by: FAMILY MEDICINE

## 2022-12-02 PROCEDURE — 80061 LIPID PANEL: CPT | Performed by: FAMILY MEDICINE

## 2022-12-02 PROCEDURE — 86803 HEPATITIS C AB TEST: CPT | Performed by: FAMILY MEDICINE

## 2022-12-02 RX ORDER — LISINOPRIL AND HYDROCHLOROTHIAZIDE 20; 25 MG/1; MG/1
1 TABLET ORAL DAILY
Qty: 90 TABLET | Refills: 3 | Status: SHIPPED | OUTPATIENT
Start: 2022-12-02 | End: 2023-06-02 | Stop reason: DRUGHIGH

## 2022-12-02 ASSESSMENT — ENCOUNTER SYMPTOMS
HEARTBURN: 0
DYSURIA: 0
DIARRHEA: 0
HEADACHES: 0
JOINT SWELLING: 0
CHILLS: 0
NERVOUS/ANXIOUS: 0
SORE THROAT: 0
FEVER: 0
DIZZINESS: 0
COUGH: 0
HEMATOCHEZIA: 0
SHORTNESS OF BREATH: 0
FREQUENCY: 0
WEAKNESS: 0
PALPITATIONS: 0
CONSTIPATION: 0
PARESTHESIAS: 0
MYALGIAS: 0
ABDOMINAL PAIN: 0
HEMATURIA: 0
EYE PAIN: 0
ARTHRALGIAS: 0
NAUSEA: 0

## 2022-12-02 ASSESSMENT — PAIN SCALES - GENERAL: PAINLEVEL: NO PAIN (0)

## 2022-12-02 ASSESSMENT — ACTIVITIES OF DAILY LIVING (ADL): CURRENT_FUNCTION: NO ASSISTANCE NEEDED

## 2022-12-02 NOTE — PROGRESS NOTES
"Answers for HPI/ROS submitted by the patient on 11/28/2022  In general, how would you rate your overall physical health?: excellent  Frequency of exercise:: 2-3 days/week  Do you usually eat at least 4 servings of fruit and vegetables a day, include whole grains & fiber, and avoid regularly eating high fat or \"junk\" foods? : No  Taking medications regularly:: Yes  Medication side effects:: None  Activities of Daily Living: no assistance needed  Home safety: no safety concerns identified  Hearing Impairment:: no hearing concerns  In the past 6 months, have you been bothered by leaking of urine?: No  abdominal pain: No  Blood in stool: No  Blood in urine: No  chest pain: No  chills: No  congestion: No  constipation: No  cough: No  diarrhea: No  dizziness: No  ear pain: No  eye pain: No  nervous/anxious: No  fever: No  frequency: No  genital sores: No  headaches: No  hearing loss: No  heartburn: No  arthralgias: No  joint swelling: No  peripheral edema: No  mood changes: No  myalgias: No  nausea: No  dysuria: No  palpitations: No  Skin sensation changes: No  sore throat: No  urgency: No  rash: No  shortness of breath: No  visual disturbance: No  weakness: No  impotence: No  penile discharge: No  In general, how would you rate your overall mental or emotional health?: excellent  Additional concerns today:: Yes  Duration of exercise:: 30-45 minutes      "

## 2022-12-02 NOTE — PROGRESS NOTES
"SUBJECTIVE:     Dov is a 67 year old who presents for Preventive Visit.      Patient has been advised of split billing requirements and indicates understanding: Yes  Are you in the first 12 months of your Medicare coverage?  No    Healthy Habits:     In general, how would you rate your overall health?  Excellent    Frequency of exercise:  2-3 days/week    Duration of exercise:  30-45 minutes    Do you usually eat at least 4 servings of fruit and vegetables a day, include whole grains    & fiber and avoid regularly eating high fat or \"junk\" foods?  No    Taking medications regularly:  Yes    Medication side effects:  None    Ability to successfully perform activities of daily living:  No assistance needed    Home Safety:  No safety concerns identified    Hearing Impairment:  No hearing concerns    In the past 6 months, have you been bothered by leaking of urine?  No    In general, how would you rate your overall mental or emotional health?  Excellent      PHQ-2 Total Score: 0    Additional concerns today:  Yes      Have you ever done Advance Care Planning? (For example, a Health Directive, POLST, or a discussion with a medical provider or your loved ones about your wishes): Yes, patient states has an Advance Care Planning document and will bring a copy to the clinic.       Fall risk  Fallen 2 or more times in the past year?: No  Any fall with injury in the past year?: No    Cognitive Screening   1) Repeat 3 items (Leader, Season, Table)    2) Clock draw: NORMAL  3) 3 item recall: Recalls 3 objects  Results: 3 items recalled: COGNITIVE IMPAIRMENT LESS LIKELY    Mini-CogTM Copyright PATT Guthrie. Licensed by the author for use in [facility]; reprinted with permission ([email or web address]). All rights reserved.      Do you have sleep apnea, excessive snoring or daytime drowsiness?: no    Reviewed and updated as needed this visit by clinical staff    Allergies  Meds  Problems             Reviewed and updated as " needed this visit by Provider    Allergies  Meds  Problems            Social History     Tobacco Use     Smoking status: Light Smoker     Types: Cigars     Smokeless tobacco: Never     Tobacco comments:     cigar occassionally   Substance Use Topics     Alcohol use: Yes     Alcohol/week: 3.0 - 4.0 standard drinks     If you drink alcohol do you typically have >3 drinks per day or >7 drinks per week? No    Alcohol Use 12/2/2022   Prescreen: >3 drinks/day or >7 drinks/week? -   Prescreen: >3 drinks/day or >7 drinks/week? No               Current providers sharing in care for this patient include:   Patient Care Team:  Terry Posadas MD as PCP - General (Family Medicine)  Annette Everett APRN CNP as Assigned PCP  Rafita Chaves DPM as Assigned Musculoskeletal Provider    The following health maintenance items are reviewed in Epic and correct as of today:  Health Maintenance   Topic Date Due     HEPATITIS C SCREENING  Never done     LUNG CANCER SCREENING  03/24/2017     COVID-19 Vaccine (5 - Booster for Pfizer series) 06/28/2022     ZOSTER IMMUNIZATION (2 of 2) 12/12/2022     ANNUAL REVIEW OF HM ORDERS  05/03/2023     NICOTINE/TOBACCO CESSATION COUNSELING Q 1 YR  12/02/2023     MEDICARE ANNUAL WELLNESS VISIT  12/02/2023     FALL RISK ASSESSMENT  12/02/2023     LIPID  01/13/2026     ADVANCE CARE PLANNING  12/02/2027     COLORECTAL CANCER SCREENING  08/08/2029     DTAP/TDAP/TD IMMUNIZATION (4 - Td or Tdap) 05/03/2032     PHQ-2 (once per calendar year)  Completed     INFLUENZA VACCINE  Completed     Pneumococcal Vaccine: 65+ Years  Completed     AORTIC ANEURYSM SCREENING (SYSTEM ASSIGNED)  Completed     IPV IMMUNIZATION  Aged Out     MENINGITIS IMMUNIZATION  Aged Out     Lab work is in process  Labs reviewed in EPIC  BP Readings from Last 3 Encounters:   12/02/22 (!) 130/90   12/01/22 (!) 149/92   11/18/22 (!) 164/98    Wt Readings from Last 3 Encounters:   12/02/22 100.2 kg (221 lb)   12/01/22 101.9 kg (224 lb  11.2 oz)   11/18/22 98.8 kg (217 lb 14.4 oz)                  Patient Active Problem List   Diagnosis     Benign Polyps Of The Large Intestine     Diverticulosis     Hypertension     Acrochordon     Acute Meniscal Tear     Joint Pain, Localized In The Right Shoulder     Pneumonia     Hypertensive heart disease without heart failure     Plantar warts     Cervical spinal cord compression (H)     Past Surgical History:   Procedure Laterality Date     EXCISION, FIBROMA, PLANTAR Right 9/19/2022    Procedure: CO2 laser ablation of wart right foot;  Surgeon: Rafita Chaves DPM;  Location: [facility] OR      REMOVAL OF TONSILS,<11 Y/O      Description: Tonsillectomy;  Recorded: 05/24/2007;     HERNIA REPAIR Left      REMOVAL OF SPERM DUCT(S)      Description: Surgery Of Male Genitalia Vasectomy;  Recorded: 05/24/2007;       Social History     Tobacco Use     Smoking status: Light Smoker     Types: Cigars     Smokeless tobacco: Never     Tobacco comments:     cigar occassionally   Substance Use Topics     Alcohol use: Yes     Alcohol/week: 3.0 - 4.0 standard drinks     Family History   Problem Relation Age of Onset     Colon Cancer Father      Diabetes Mother      Diabetes Brother          Current Outpatient Medications   Medication Sig Dispense Refill     lisinopril-hydrochlorothiazide (ZESTORETIC) 20-25 MG tablet Take 1 tablet by mouth daily 90 tablet 3     lisinopril-hydrochlorothiazide (ZESTORETIC) 10-12.5 MG tablet Take 1 tablet by mouth daily 90 tablet 1     No Known Allergies    Needs COIVD-19 Pfizer bivalent booster.  Will receive Shingrix booster after 12/17/22 through local pharmacy.      Review of Systems   Constitutional: Negative for chills and fever.   HENT: Negative for congestion, ear pain, hearing loss and sore throat.    Eyes: Negative for pain and visual disturbance.   Respiratory: Negative for cough and shortness of breath.    Cardiovascular: Negative for chest pain, palpitations and peripheral  "edema.   Gastrointestinal: Negative for abdominal pain, constipation, diarrhea, heartburn, hematochezia and nausea.   Genitourinary: Negative for dysuria, frequency, genital sores, hematuria, impotence, penile discharge and urgency.   Musculoskeletal: Negative for arthralgias, joint swelling and myalgias.   Skin: Negative for rash.   Neurological: Negative for dizziness, weakness, headaches and paresthesias.   Psychiatric/Behavioral: Negative for mood changes. The patient is not nervous/anxious.      Constitutional, HEENT, cardiovascular, pulmonary, GI, , musculoskeletal, neuro, skin, endocrine and psych systems are negative, except as otherwise noted.    OBJECTIVE:   BP (!) 130/90   Pulse 100   Resp 14   Ht 1.759 m (5' 9.25\")   Wt 100.2 kg (221 lb)   SpO2 98%   BMI 32.40 kg/m   Estimated body mass index is 32.4 kg/m  as calculated from the following:    Height as of this encounter: 1.759 m (5' 9.25\").    Weight as of this encounter: 100.2 kg (221 lb).     Physical Exam  GENERAL: healthy, alert and no distress.  BMI - 32.4.  EYES: Eyes grossly normal to inspection, PERRL and conjunctivae and sclerae normal  HENT: ear canals and TM's normal, nose and mouth without ulcers or lesions  NECK: no adenopathy, no asymmetry, masses, or scars and thyroid normal to palpation  RESP: lungs clear to auscultation - no rales, rhonchi or wheezes  CV: regular rate and rhythm, normal S1 S2, no S3 or S4, no murmur, click or rub, no peripheral edema and peripheral pulses strong  ABDOMEN: soft, nontender, no hepatosplenomegaly, no masses and bowel sounds normal   (male): normal male genitalia without lesions or urethral discharge, no hernia  RECTAL: normal sphincter tone, no rectal masses, prostate normal size, smooth, nontender without nodules or masses  MS: no gross musculoskeletal defects noted, no edema  SKIN: no suspicious lesions or rashes  NEURO: Normal strength and tone, mentation intact and speech normal  PSYCH: " mentation appears normal, affect normal/bright    Diagnostic Test Results:  Labs reviewed in Epic  No results found for this or any previous visit (from the past 24 hour(s)).    ASSESSMENT / PLAN:     Encounter for Medicare annual wellness exam  Annual wellness visit completed.  Risk questionnaire reviewed.  Suboptimal diet.  Lack of consistent exercise due to recent plantar wart laser treatment otherwise anticipates return to activity upcoming.  Annual wellness visits to continue.    Class 1 obesity due to excess calories with serious comorbidity and body mass index (BMI) of 32.0 to 32.9 in adult  Dietary and exercise modification for weight goal less than 210 pounds initially, less than 200 pounds ideally.    Essential hypertension, benign  Hypertension suboptimal control blood pressure 146/82 on recheck and will increase lisinopril hydrochlorothiazide 10/12.5 instead up to 20/25 and use 1 tablet daily.  Patient declines follow-up in office for blood pressure recheck stating that he will monitor at home for goal less than 130/80 and otherwise reassess at annual wellness visit in 1 year.  Med monitoring completed today.  - Basic metabolic panel  - Lipid panel reflex to direct LDL Fasting  - lisinopril-hydrochlorothiazide (ZESTORETIC) 20-25 MG tablet  Dispense: 90 tablet; Refill: 3    Impaired fasting glucose  History of impaired fasting glucose.  Recent A1c 5.4% September 1, 2022 and will update fasting glucose today.  Weight goal less than 210 pounds initially, less than 200 pounds ideally.  - Basic metabolic panel    Tubular adenoma of colon  History of tubular adenoma of colon.  Colonoscopy August 8, 2019 and anticipate 5-year follow-up.    Diverticulosis of large intestine without hemorrhage  History of diverticulosis remaining asymptomatic.    Screening for prostate cancer  PSA for prostate cancer screening  - Prostate Specific Antigen Screen    High priority for 2019-nCoV vaccine  COVID-19 bivalent Pfizer  booster provided today.  Patient to receive Shingrix booster after December 17, 2022 through local pharmacy.  - COVID-19,PF,PFIZER BOOSTER BIVALENT 12+Yrs    Need for hepatitis C screening test  Routine hepatitis C screen based on age criteria.  - Hepatitis C Screen Reflex to HCV RNA Quant and Genotype       Patient has been advised of split billing requirements and indicates understanding: No      COUNSELING:  Reviewed preventive health counseling, as reflected in patient instructions       Regular exercise       Healthy diet/nutrition       Vision screening       Hearing screening       Dental care       Bladder control       Fall risk prevention       Aspirin prophylaxis        Colon cancer screening       Prostate cancer screening        He reports that he has been smoking cigars. He has never used smokeless tobacco.  Nicotine/Tobacco Cessation Plan:   Information offered: Patient not interested at this time      Appropriate preventive services were discussed with this patient, including applicable screening as appropriate for cardiovascular disease, diabetes, osteopenia/osteoporosis, and glaucoma.  As appropriate for age/gender, discussed screening for colorectal cancer, prostate cancer, breast cancer, and cervical cancer. Checklist reviewing preventive services available has been given to the patient.    Reviewed patients plan of care and provided an AVS. The Intermediate Care Plan ( asthma action plan, low back pain action plan, and migraine action plan) for Otis meets the Care Plan requirement. This Care Plan has been established and reviewed with the Patient.          Terry Posadas MD  [facility]    Identified Health Risks:    The patient was counseled and encouraged to consider modifying their diet and eating habits. He was provided with information on recommended healthy diet options.

## 2022-12-02 NOTE — PATIENT INSTRUCTIONS
Patient Education   Personalized Prevention Plan  You are due for the preventive services outlined below.  Your care team is available to assist you in scheduling these services.  If you have already completed any of these items, please share that information with your care team to update in your medical record.  Health Maintenance Due   Topic Date Due     Hepatitis C Screening  Never done     LUNG CANCER SCREENING  03/24/2017     COVID-19 Vaccine (5 - Booster for Pfizer series) 06/28/2022     Zoster (Shingles) Vaccine (2 of 2) 12/12/2022       Understanding USDA MyPlate  The USDA has guidelines to help you make healthy food choices. These are called MyPlate. MyPlate shows the food groups that make up healthy meals using the image of a place setting. Before you eat, think about the healthiest choices for what to put on your plate or in your cup or bowl. To learn more about building a healthy plate, visit www.choosemyplate.gov.    The food groups    Fruits. Any fruit or 100% fruit juice counts as part of the Fruit Group. Fruits may be fresh, canned, frozen, or dried, and may be whole, cut-up, or pureed. Make 1/2 of your plate fruits and vegetables.    Vegetables. Any vegetable or 100% vegetable juice counts as a member of the Vegetable Group. Vegetables may be fresh, frozen, canned, or dried. They can be served raw or cooked and may be whole, cut-up, or mashed. Make 1/2 of your plate fruits and vegetables.    Grains. All foods made from grains are part of the Grains Group. These include wheat, rice, oats, cornmeal, and barley. Grains are often used to make foods such as bread, pasta, oatmeal, cereal, tortillas, and grits. Grains should be no more than 1/4 of your plate. At least half of your grains should be whole grains.    Protein. This group includes meat, poultry, seafood, beans and peas, eggs, processed soy products (such as tofu), nuts (including nut butters), and seeds. Make protein choices no more than 1/4  of your plate. Meat and poultry choices should be lean or low fat.    Dairy. The Dairy Group includes all fluid milk products and foods made from milk that contain calcium, such as yogurt and cheese. (Foods that have little calcium, such as cream, butter, and cream cheese, are not part of this group.) Most dairy choices should be low-fat or fat-free.    Oils. Oils aren't a food group, but they do contain essential nutrients. However it's important to watch your intake of oils. These are fats that are liquid at room temperature. They include canola, corn, olive, soybean, vegetable, and sunflower oil. Foods that are mainly oil include mayonnaise, certain salad dressings, and soft margarines. You likely already get your daily oil allowance from the foods you eat.  Things to limit  Eating healthy also means limiting these things in your diet:       Salt (sodium). Many processed foods have a lot of sodium. To keep sodium intake down, eat fresh vegetables, meats, poultry, and seafood when possible. Purchase low-sodium, reduced-sodium, or no-salt-added food products at the store. And don't add salt to your meals at home. Instead, season them with herbs and spices such as dill, oregano, cumin, and paprika. Or try adding flavor with lemon or lime zest and juice.    Saturated fat. Saturated fats are most often found in animal products such as beef, pork, and chicken. They are often solid at room temperature, such as butter. To reduce your saturated fat intake, choose leaner cuts of meat and poultry. And try healthier cooking methods such as grilling, broiling, roasting, or baking. For a simple lower-fat swap, use plain nonfat yogurt instead of mayonnaise when making potato salad or macaroni salad.    Added sugars. These are sugars added to foods. They are in foods such as ice cream, candy, soda, fruit drinks, sports drinks, energy drinks, cookies, pastries, jams, and syrups. Cut down on added sugars by sharing sweet treats  with a family member or friend. You can also choose fruit for dessert, and drink water or other unsweetened beverages.     [employer] last reviewed this educational content on 6/1/2020 2000-2021 [employer]. All rights reserved. This information is not intended as a substitute for professional medical care. Always follow your healthcare professional's instructions.

## 2022-12-03 LAB — HCV AB SERPL QL IA: NONREACTIVE

## 2023-02-03 ENCOUNTER — TRANSFERRED RECORDS (OUTPATIENT)
Dept: HEALTH INFORMATION MANAGEMENT | Facility: CLINIC | Age: 68
End: 2023-02-03

## 2023-02-24 DIAGNOSIS — I10 ESSENTIAL HYPERTENSION: ICD-10-CM

## 2023-02-25 NOTE — TELEPHONE ENCOUNTER
"Routing refill request to provider for review/approval because:  bp out of range    Last Written Prescription Date:  9/1/22  Last Fill Quantity: 90,  # refills: 1   Last office visit provider:  12/2/22     Requested Prescriptions   Pending Prescriptions Disp Refills     lisinopril-hydrochlorothiazide (ZESTORETIC) 10-12.5 MG tablet [Pharmacy Med Name: Lisinopril-hydroCHLOROthiazide Oral Tablet 10-12.5 MG] 90 tablet 0     Sig: TAKE ONE TABLET BY MOUTH ONE TIME DAILY       Diuretics (Including Combos) Protocol Failed - 2/24/2023  9:14 AM        Failed - Blood pressure under 140/90 in past 12 months     BP Readings from Last 3 Encounters:   12/02/22 (!) 130/90   12/01/22 (!) 149/92   11/18/22 (!) 164/98                 Passed - Recent (12 mo) or future (30 days) visit within the authorizing provider's specialty     Patient has had an office visit with the authorizing provider or a provider within the authorizing providers department within the previous 12 mos or has a future within next 30 days. See \"Patient Info\" tab in inbasket, or \"Choose Columns\" in Meds & Orders section of the refill encounter.              Passed - Medication is active on med list        Passed - Patient is age 18 or older        Passed - Normal serum creatinine on file in past 12 months     Recent Labs   Lab Test 12/02/22  0848   CR 1.12              Passed - Normal serum potassium on file in past 12 months     Recent Labs   Lab Test 12/02/22  0848   POTASSIUM 4.9                    Passed - Normal serum sodium on file in past 12 months     Recent Labs   Lab Test 12/02/22  0848                ACE Inhibitors (Including Combos) Protocol Failed - 2/24/2023  9:14 AM        Failed - Blood pressure under 140/90 in past 12 months     BP Readings from Last 3 Encounters:   12/02/22 (!) 130/90   12/01/22 (!) 149/92   11/18/22 (!) 164/98                 Passed - Recent (12 mo) or future (30 days) visit within the authorizing provider's specialty     " "Patient has had an office visit with the authorizing provider or a provider within the authorizing providers department within the previous 12 mos or has a future within next 30 days. See \"Patient Info\" tab in inbasket, or \"Choose Columns\" in Meds & Orders section of the refill encounter.              Passed - Medication is active on med list        Passed - Patient is age 18 or older        Passed - Normal serum creatinine on file in past 12 months     Recent Labs   Lab Test 12/02/22  0848   CR 1.12       Ok to refill medication if creatinine is low          Passed - Normal serum potassium on file in past 12 months     Recent Labs   Lab Test 12/02/22  0848   POTASSIUM 4.9                  Prachi Stapleton, RN 02/25/23 2:11 PM  "

## 2023-02-26 RX ORDER — LISINOPRIL AND HYDROCHLOROTHIAZIDE 20; 25 MG/1; MG/1
1 TABLET ORAL DAILY
Qty: 90 TABLET | Refills: 1 | Status: SHIPPED | OUTPATIENT
Start: 2023-02-26 | End: 2023-08-28

## 2023-05-02 ENCOUNTER — OFFICE VISIT (OUTPATIENT)
Dept: FAMILY MEDICINE | Facility: CLINIC | Age: 68
End: 2023-05-02
Payer: COMMERCIAL

## 2023-05-02 ENCOUNTER — ANCILLARY PROCEDURE (OUTPATIENT)
Dept: GENERAL RADIOLOGY | Facility: CLINIC | Age: 68
End: 2023-05-02
Attending: FAMILY MEDICINE
Payer: COMMERCIAL

## 2023-05-02 VITALS
WEIGHT: 229.9 LBS | SYSTOLIC BLOOD PRESSURE: 133 MMHG | RESPIRATION RATE: 15 BRPM | HEIGHT: 69 IN | TEMPERATURE: 97.9 F | DIASTOLIC BLOOD PRESSURE: 82 MMHG | OXYGEN SATURATION: 98 % | BODY MASS INDEX: 34.05 KG/M2 | HEART RATE: 87 BPM

## 2023-05-02 DIAGNOSIS — R06.02 SOB (SHORTNESS OF BREATH): ICD-10-CM

## 2023-05-02 DIAGNOSIS — M25.562 CHRONIC PAIN OF LEFT KNEE: ICD-10-CM

## 2023-05-02 DIAGNOSIS — G89.29 CHRONIC PAIN OF LEFT KNEE: Primary | ICD-10-CM

## 2023-05-02 DIAGNOSIS — M25.562 CHRONIC PAIN OF LEFT KNEE: Primary | ICD-10-CM

## 2023-05-02 DIAGNOSIS — G89.29 CHRONIC PAIN OF LEFT KNEE: ICD-10-CM

## 2023-05-02 PROCEDURE — 73562 X-RAY EXAM OF KNEE 3: CPT | Mod: TC | Performed by: RADIOLOGY

## 2023-05-02 PROCEDURE — 99214 OFFICE O/P EST MOD 30 MIN: CPT | Performed by: FAMILY MEDICINE

## 2023-05-02 ASSESSMENT — PAIN SCALES - GENERAL: PAINLEVEL: SEVERE PAIN (6)

## 2023-05-02 NOTE — PROGRESS NOTES
"  Assessment & Plan     Chronic pain of left knee  There are some medial and lateral degenerative changes very seen on x-ray; we will start with some physical therapy  - REVIEW OF HEALTH MAINTENANCE PROTOCOL ORDERS  - XR Knee Left 3 Views  - Physical Therapy Referral    SOB (shortness of breath)  At the end of the visit as I was leaving the door the patient said he was by the way getting more short of breath.  He is planning a trip to [place] year in the fall with his wife and neighbors.  On closer questioning this is quite a change from his normal vigorous state of health.  I  have him see his primary care provider Terry Posadas here within a month.  If he gets more short of breath or develops any chest pain he will see us sooner or go to an urgent care               BMI:   Estimated body mass index is 33.95 kg/m  as calculated from the following:    Height as of this encounter: 1.753 m (5' 9\").    Weight as of this encounter: 104.3 kg (229 lb 14.4 oz).   Weight management plan: Discussed healthy diet and exercise guidelines        Lorne العراقي MD  [facility]ACIN[facility] Monae is a 68 year old, presenting for the following health issues:  Knee Pain (Left knee)        5/2/2023     6:54 AM   Additional Questions   Roomed by Jane Stern   Accompanied by Self     HPI   Patient is experiencing some pain in his left knee at the end of a 2 mile walk.  He has had previous arthritic problems with his right shoulder and has had a rotator cuff repair.  There is no history of trauma and he is not experiencing any click or locking of the knee.  Physical exam today reveals a well muscled but overweight 68-year-old.  He is planning on going to [place] here in the fall.  He mentions at the end of the visit that he is getting more short of breath.  I think this should be investigated with his primary care physician and we have ensured that he made the appointment here prior to his leaving.  I did not " "see much on the x-ray.  I Magi refer him out for physical therapy and get some knee exercises some quad strengthening exercises.          Review of Systems   Constitutional, HEENT, cardiovascular, pulmonary, gi and gu systems are negative, except as otherwise noted.      Objective    /82   Pulse 87   Temp 97.9  F (36.6  C) (Temporal)   Resp 15   Ht 1.753 m (5' 9\")   Wt 104.3 kg (229 lb 14.4 oz)   SpO2 98%   BMI 33.95 kg/m    Body mass index is 33.95 kg/m .  Physical Exam   GENERAL: healthy, alert and no distress  NECK: no adenopathy, no asymmetry, masses, or scars and thyroid normal to palpation  RESP: lungs clear to auscultation - no rales, rhonchi or wheezes  CV: regular rate and rhythm, normal S1 S2, no S3 or S4, no murmur, click or rub, no peripheral edema and peripheral pulses strong  ABDOMEN: soft, nontender, no hepatosplenomegaly, no masses and bowel sounds normal  MS: no gross musculoskeletal defects noted, no edema                    "

## 2023-05-19 ASSESSMENT — ACTIVITIES OF DAILY LIVING (ADL)
KNEE_ACTIVITY_OF_DAILY_LIVING_SCORE: 78.57
HOW_WOULD_YOU_RATE_THE_CURRENT_FUNCTION_OF_YOUR_KNEE_DURING_YOUR_USUAL_DAILY_ACTIVITIES_ON_A_SCALE_FROM_0_TO_100_WITH_100_BEING_YOUR_LEVEL_OF_KNEE_FUNCTION_PRIOR_TO_YOUR_INJURY_AND_0_BEING_THE_INABILITY_TO_PERFORM_ANY_OF_YOUR_USUAL_DAILY_ACTIVITIES?: 95
HOW_WOULD_YOU_RATE_THE_OVERALL_FUNCTION_OF_YOUR_KNEE_DURING_YOUR_USUAL_DAILY_ACTIVITIES?: NEARLY NORMAL
GIVING WAY, BUCKLING OR SHIFTING OF KNEE: THE SYMPTOM AFFECTS MY ACTIVITY SLIGHTLY
WEAKNESS: THE SYMPTOM AFFECTS MY ACTIVITY SLIGHTLY
WEAKNESS: THE SYMPTOM AFFECTS MY ACTIVITY SLIGHTLY
STIFFNESS: THE SYMPTOM AFFECTS MY ACTIVITY SLIGHTLY
LIMPING: I DO NOT HAVE THE SYMPTOM
GO DOWN STAIRS: ACTIVITY IS MINIMALLY DIFFICULT
AS_A_RESULT_OF_YOUR_KNEE_INJURY,_HOW_WOULD_YOU_RATE_YOUR_CURRENT_LEVEL_OF_DAILY_ACTIVITY?: NEARLY NORMAL
PAIN: THE SYMPTOM AFFECTS MY ACTIVITY SLIGHTLY
SWELLING: I DO NOT HAVE THE SYMPTOM
RAW_SCORE: 55
GO UP STAIRS: ACTIVITY IS SOMEWHAT DIFFICULT
KNEEL ON THE FRONT OF YOUR KNEE: ACTIVITY IS MINIMALLY DIFFICULT
PLEASE_INDICATE_YOR_PRIMARY_REASON_FOR_REFERRAL_TO_THERAPY:: KNEE
WALK: ACTIVITY IS NOT DIFFICULT
GO DOWN STAIRS: ACTIVITY IS MINIMALLY DIFFICULT
KNEEL ON THE FRONT OF YOUR KNEE: ACTIVITY IS MINIMALLY DIFFICULT
SIT WITH YOUR KNEE BENT: ACTIVITY IS NOT DIFFICULT
LIMPING: I DO NOT HAVE THE SYMPTOM
SWELLING: I DO NOT HAVE THE SYMPTOM
AS_A_RESULT_OF_YOUR_KNEE_INJURY,_HOW_WOULD_YOU_RATE_YOUR_CURRENT_LEVEL_OF_DAILY_ACTIVITY?: NEARLY NORMAL
STAND: ACTIVITY IS NOT DIFFICULT
SQUAT: ACTIVITY IS SOMEWHAT DIFFICULT
STAND: ACTIVITY IS NOT DIFFICULT
RISE FROM A CHAIR: ACTIVITY IS MINIMALLY DIFFICULT
SIT WITH YOUR KNEE BENT: ACTIVITY IS NOT DIFFICULT
GO UP STAIRS: ACTIVITY IS SOMEWHAT DIFFICULT
KNEE_ACTIVITY_OF_DAILY_LIVING_SUM: 55
STIFFNESS: THE SYMPTOM AFFECTS MY ACTIVITY SLIGHTLY
SQUAT: ACTIVITY IS SOMEWHAT DIFFICULT
PAIN: THE SYMPTOM AFFECTS MY ACTIVITY SLIGHTLY
RISE FROM A CHAIR: ACTIVITY IS MINIMALLY DIFFICULT
HOW_WOULD_YOU_RATE_THE_CURRENT_FUNCTION_OF_YOUR_KNEE_DURING_YOUR_USUAL_DAILY_ACTIVITIES_ON_A_SCALE_FROM_0_TO_100_WITH_100_BEING_YOUR_LEVEL_OF_KNEE_FUNCTION_PRIOR_TO_YOUR_INJURY_AND_0_BEING_THE_INABILITY_TO_PERFORM_ANY_OF_YOUR_USUAL_DAILY_ACTIVITIES?: 95
WALK: ACTIVITY IS NOT DIFFICULT
GIVING WAY, BUCKLING OR SHIFTING OF KNEE: THE SYMPTOM AFFECTS MY ACTIVITY SLIGHTLY
HOW_WOULD_YOU_RATE_THE_OVERALL_FUNCTION_OF_YOUR_KNEE_DURING_YOUR_USUAL_DAILY_ACTIVITIES?: NEARLY NORMAL

## 2023-05-23 ENCOUNTER — THERAPY VISIT (OUTPATIENT)
Dept: PHYSICAL THERAPY | Facility: REHABILITATION | Age: 68
End: 2023-05-23
Attending: FAMILY MEDICINE
Payer: COMMERCIAL

## 2023-05-23 DIAGNOSIS — G89.29 CHRONIC PAIN OF LEFT KNEE: ICD-10-CM

## 2023-05-23 DIAGNOSIS — M25.562 CHRONIC PAIN OF LEFT KNEE: ICD-10-CM

## 2023-05-23 PROCEDURE — 97110 THERAPEUTIC EXERCISES: CPT | Mod: GP | Performed by: PHYSICAL THERAPIST

## 2023-05-23 PROCEDURE — 97161 PT EVAL LOW COMPLEX 20 MIN: CPT | Mod: GP | Performed by: PHYSICAL THERAPIST

## 2023-05-23 NOTE — PROGRESS NOTES
DISCHARGE  Reason for Discharge: Patient has failed to schedule further appointments.  Patient had 2 other appointments scheduled and canceled them.    Equipment Issued: None    Discharge Plan: Patient to continue home program as tolerated.    Referring Provider:  Lorne العراقي      PHYSICAL THERAPY EVALUATION  Type of Visit: Evaluation    See electronic medical record for Abuse and Falls Screening details.    Subjective      Presenting condition or subjective complaint: Chronic left knee pain able to do more worse pain with climbing stairs the pain, comes and goes.  Pain with rolling from his leftto right  when lifting the left leg.  No pain squatting. able to kneel. Worse about 3 weeks ago. at MD appointment  Date of onset: 11/15/22    Relevant medical history:     Dates & types of surgery:      Prior diagnostic imaging/testing results: X-ray     Prior therapy history for the same diagnosis, illness or injury: No      Prior Level of Function   Transfers: Independent  Ambulation: Independent  ADL: Independent      Living Environment  Social support: With a significant other or spouse   Type of home: House; 2-story   Stairs to enter the home: Yes 3 Is there a railing: No   Ramp: No   Stairs inside the home: Yes 26 Is there a railing: Yes   Help at home: None  Equipment owned:       Employment: No    Hobbies/Interests:      Patient goals for therapy: Eliminate or reduse discomfort and find cause of condition    Pain assessment: Pain present     Objective   KNEE EVALUATION  PAIN: Pain Level with Use: 2/10 pain never more than a 6/10.  Walk 2-3 miles per day stop ever 3/4 mile for low back pain stop squat and stretch then his knee feels better.    POSTURE: Rounded shoulders forward head increased gastroc musculature bilaterally  GAIT:  Bouncy gait with heel rise.  Not favoring his knee decreased movement in his upper back with walking.   Assistive Device(s): None    ROM: AROM WNL left knee 144 right 140 right hyper  extension 2 left hyper extension 7    Strength: WNL    Special Tests:    Left Right   Apley's (Meniscus)     Michelle's (Meniscus)     Susu's (ITB/TFL) Slightly tight Slightly tight   Patellar Apprehension Test Negative  Negative    Patella Tracking Lateral displacement, slightly Lateral displacement, slightly                                 Patella slightly tight on the right WNL on the left weaker left VMO versus right left hamstring tighter hip IR tight bilaterally  Ankle dorsiflexion 13 right 18 left Hip IR,ER flexion abduction adduction 5/5 knee flexion and extension 5/5 and great toe extension 5/5.  Karel test very slightly tight more with IT band then psoas.     PALPATION T3-T8 right rotated in standing ER on the right in standing navicular 1 finger to the floor.  Right rear foot slightly everted in standing left WNL. Slightly + left FW bend doesn't want to shift to the right from shoulders stiff with compression on the right side.  Tightness in the coronary ligaments of the left knee anterior lateral as well as some tightness in the posterior aspect of the joint line on the medial side.  JOINT MOBILITY: WNL left slight decreased inferior glide on the right tighter articularis genu on the right slight tight on the left.      Assessment & Plan   CLINICAL IMPRESSIONS   Medical Diagnosis: M25.562, G89.29 (ICD-10-CM) - Chronic pain of left knee    Treatment Diagnosis: Chronic left knee pain   Impression/Assessment: Patient is a 68 year old male with left anterior knee complaints.  The following significant findings have been identified: Pain, Inflammation and Decreased activity tolerance. These impairments interfere with their ability to perform recreational activities as compared to previous level of function.     Clinical Decision Making (Complexity):   Clinical Presentation: Stable/Uncomplicated  Clinical Presentation Rationale: based on medical and personal factors listed in PT evaluation  Clinical Decision  Making (Complexity): Low complexity    PLAN OF CARE  Treatment Interventions:  Modalities: Cryotherapy, Hot Pack, Ultrasound, Check precautions and contraindications before doing any modalities  Interventions: Gait Training, Manual Therapy, Neuromuscular Re-education, Therapeutic Exercise, Manual neural mobilization as needed    Long Term Goals     PT Goal 1  Goal Identifier: HEP  Goal Description: The patient will demonstrate independence in home exercise program to aid in home management of symptoms  Goal Progress: Patient was able to understand his exercises today and perform them well  Target Date: 07/18/23      Frequency of Treatment: 1 time per week  Duration of Treatment: 8 weeks      Education Assessment:   Learner/Method: Patient    Risks and benefits of evaluation/treatment have been explained.   Patient/Family/caregiver agrees with Plan of Care.     Evaluation Time:     PT Eval, Low Complexity Minutes (45076): 25        Signing Clinician: Brie Draper PT      [facility]                                                                                   OUTPATIENT PHYSICAL THERAPY      PLAN OF TREATMENT FOR OUTPATIENT REHABILITATION   Patient's Last Name, First Name, Otis Griffin YOB: 1955   Provider's Name   [facility]   Medical Record No.  [id number]     Onset Date: 11/15/22  Start of Care Date: 05/23/23     Medical Diagnosis:  M25.562, G89.29 (ICD-10-CM) - Chronic pain of left knee      PT Treatment Diagnosis:  Chronic left knee pain Plan of Treatment  Frequency/Duration: 1 time per week/ 8 weeks    Certification date from 05/23/23 to 08/15/23         See note for plan of treatment details and functional goals     Brie Draper, PT                         I CERTIFY THE NEED FOR THESE SERVICES FURNISHED UNDER        THIS PLAN OF TREATMENT AND WHILE UNDER MY CARE     (Physician attestation of this  document indicates review and certification of the therapy plan).                  Referring Provider:  Lorne العراقي      Initial Assessment  See Epic Evaluation- Start of Care Date: 05/23/23

## 2023-06-02 ENCOUNTER — OFFICE VISIT (OUTPATIENT)
Dept: FAMILY MEDICINE | Facility: CLINIC | Age: 68
End: 2023-06-02
Payer: COMMERCIAL

## 2023-06-02 ENCOUNTER — ANCILLARY PROCEDURE (OUTPATIENT)
Dept: GENERAL RADIOLOGY | Facility: CLINIC | Age: 68
End: 2023-06-02
Attending: FAMILY MEDICINE
Payer: COMMERCIAL

## 2023-06-02 VITALS
OXYGEN SATURATION: 97 % | TEMPERATURE: 97.9 F | HEART RATE: 90 BPM | RESPIRATION RATE: 18 BRPM | SYSTOLIC BLOOD PRESSURE: 110 MMHG | BODY MASS INDEX: 33.06 KG/M2 | DIASTOLIC BLOOD PRESSURE: 76 MMHG | WEIGHT: 223.9 LBS

## 2023-06-02 DIAGNOSIS — I10 ESSENTIAL HYPERTENSION: ICD-10-CM

## 2023-06-02 DIAGNOSIS — R06.09 DYSPNEA ON EXERTION: Primary | ICD-10-CM

## 2023-06-02 DIAGNOSIS — R06.83 SNORING: ICD-10-CM

## 2023-06-02 DIAGNOSIS — R06.09 DYSPNEA ON EXERTION: ICD-10-CM

## 2023-06-02 DIAGNOSIS — G95.20 CERVICAL SPINAL CORD COMPRESSION (H): ICD-10-CM

## 2023-06-02 LAB
ALBUMIN SERPL BCG-MCNC: 4.5 G/DL (ref 3.5–5.2)
ALP SERPL-CCNC: 72 U/L (ref 40–129)
ALT SERPL W P-5'-P-CCNC: 24 U/L (ref 10–50)
ANION GAP SERPL CALCULATED.3IONS-SCNC: 11 MMOL/L (ref 7–15)
AST SERPL W P-5'-P-CCNC: 22 U/L (ref 10–50)
ATRIAL RATE - MUSE: 86 BPM
BILIRUB SERPL-MCNC: 0.6 MG/DL
BUN SERPL-MCNC: 17.1 MG/DL (ref 8–23)
CALCIUM SERPL-MCNC: 10.1 MG/DL (ref 8.8–10.2)
CHLORIDE SERPL-SCNC: 105 MMOL/L (ref 98–107)
CREAT SERPL-MCNC: 1.04 MG/DL (ref 0.67–1.17)
DEPRECATED HCO3 PLAS-SCNC: 23 MMOL/L (ref 22–29)
DIASTOLIC BLOOD PRESSURE - MUSE: NORMAL MMHG
ERYTHROCYTE [DISTWIDTH] IN BLOOD BY AUTOMATED COUNT: 13.3 % (ref 10–15)
GFR SERPL CREATININE-BSD FRML MDRD: 78 ML/MIN/1.73M2
GLUCOSE SERPL-MCNC: 94 MG/DL (ref 70–99)
HCT VFR BLD AUTO: 42.1 % (ref 40–53)
HGB BLD-MCNC: 14.4 G/DL (ref 13.3–17.7)
INTERPRETATION ECG - MUSE: NORMAL
MCH RBC QN AUTO: 30.6 PG (ref 26.5–33)
MCHC RBC AUTO-ENTMCNC: 34.2 G/DL (ref 31.5–36.5)
MCV RBC AUTO: 89 FL (ref 78–100)
NT-PROBNP SERPL-MCNC: 30 PG/ML (ref 0–900)
P AXIS - MUSE: 58 DEGREES
PLATELET # BLD AUTO: 205 10E3/UL (ref 150–450)
POTASSIUM SERPL-SCNC: 4 MMOL/L (ref 3.4–5.3)
PR INTERVAL - MUSE: 172 MS
PROT SERPL-MCNC: 7 G/DL (ref 6.4–8.3)
QRS DURATION - MUSE: 96 MS
QT - MUSE: 372 MS
QTC - MUSE: 445 MS
R AXIS - MUSE: 33 DEGREES
RBC # BLD AUTO: 4.71 10E6/UL (ref 4.4–5.9)
SODIUM SERPL-SCNC: 139 MMOL/L (ref 136–145)
SYSTOLIC BLOOD PRESSURE - MUSE: NORMAL MMHG
T AXIS - MUSE: 37 DEGREES
VENTRICULAR RATE- MUSE: 86 BPM
WBC # BLD AUTO: 6.8 10E3/UL (ref 4–11)

## 2023-06-02 PROCEDURE — 71046 X-RAY EXAM CHEST 2 VIEWS: CPT | Mod: TC | Performed by: RADIOLOGY

## 2023-06-02 PROCEDURE — 93010 ELECTROCARDIOGRAM REPORT: CPT | Performed by: GENERAL ACUTE CARE HOSPITAL

## 2023-06-02 PROCEDURE — 83880 ASSAY OF NATRIURETIC PEPTIDE: CPT | Performed by: FAMILY MEDICINE

## 2023-06-02 PROCEDURE — 80053 COMPREHEN METABOLIC PANEL: CPT | Performed by: FAMILY MEDICINE

## 2023-06-02 PROCEDURE — 36415 COLL VENOUS BLD VENIPUNCTURE: CPT | Performed by: FAMILY MEDICINE

## 2023-06-02 PROCEDURE — 85027 COMPLETE CBC AUTOMATED: CPT | Performed by: FAMILY MEDICINE

## 2023-06-02 PROCEDURE — 93005 ELECTROCARDIOGRAM TRACING: CPT | Performed by: FAMILY MEDICINE

## 2023-06-02 PROCEDURE — 99214 OFFICE O/P EST MOD 30 MIN: CPT | Performed by: FAMILY MEDICINE

## 2023-06-02 ASSESSMENT — PAIN SCALES - GENERAL: PAINLEVEL: NO PAIN (0)

## 2023-06-02 NOTE — PROGRESS NOTES
Assessment/Plan:    Dyspnea on exertion  Dyspnea on exertion.  Unclear etiology.  Chest x-ray negative.  EKG negative.  Stress nuclear study to be completed.  Holter monitor to ensure no evidence for tacky bradycardia arrhythmia etc.  BNP level pending.  CBC without evidence for anemia etc.  Comprehensive metabolic panel obtained.  Avoidance of exacerbating triggers currently including strenuous activities etc.  Will notify with upcoming results.  - XR Chest 2 Views  - NM MPI Treadmill  - N terminal pro BNP outpatient  - CBC with platelets  - Comprehensive metabolic panel  - Adult Holter Monitor 24 hour  - EKG 12-lead, tracing only  - N terminal pro BNP outpatient  - CBC with platelets  - Comprehensive metabolic panel    Essential hypertension  Hypertension.  Appears well controlled currently with lisinopril hydrochlorothiazide 20/25 daily.  - Comprehensive metabolic panel  - Comprehensive metabolic panel    Snoring  Snoring.  Would have sleep study in the future to rule out obstructive sleep apnea.  Referral placed  - Adult Sleep Eval & Management  Referral    Cervical spinal cord compression (H)  Home exercises and stretching described with benefit          Subjective:    Otis Brumfield is seen today for shortness of breath.  Typically with activity.  Climbing stairs or climbing an incline.  Symptoms over past 5 to 6 weeks while taking a boat lift apart in order to repair that his leg.  Continues to have this shortness of breath with activity pretty consistent without chest pain.  No palpitations.  Utilizing lisinopril hydrochlorothiazide 20/25 daily for hypertension management.  Does describe difficulty losing weight.  Does have early satiety after half a sandwich feels quite full.  No bloating.  No diarrhea or constipation.  Had increase lisinopril hydrochlorothiazide from 10/12.5 instead up to 20/25 at time of physical December 2, 2022.  History of cervical spinal cord compression doing well  currently with home exercises.  Does snore.  Wife stated recently that he held his breath for 10 seconds while sleeping.  Has not had sleep study.  Comprehensive review of systems as above otherwise all negative.     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   1 older bro - DM   Surgeries: inguinal hernia repair ? left side; right rotator cuff surgery  (Dr. Gabriele Abernathy)   Hospitalizations: none    Past Surgical History:   Procedure Laterality Date     EXCISION, FIBROMA, PLANTAR Right 2022    Procedure: CO2 laser ablation of wart right foot;  Surgeon: Rafita Chaves DPM;  Location: [facility]      REMOVAL OF TONSILS,<13 Y/O      Description: Tonsillectomy;  Recorded: 2007;     HERNIA REPAIR Left      REMOVAL OF SPERM DUCT(S)      Description: Surgery Of Male Genitalia Vasectomy;  Recorded: 2007;        Family History   Problem Relation Age of Onset     Colon Cancer Father      Diabetes Mother      Diabetes Brother         Past Medical History:   Diagnosis Date     Benign neoplasm of colon     Created by Conversion      Diverticulosis of large intestine     Created by Conversion  Replacement Utility updated for latest IMO load     Hypertension      Hypertrophic and atrophic condition of skin     Created by Conversion  Replacement Utility updated for latest IMO load        Social History     Tobacco Use     Smoking status: Light Smoker     Types: Cigars     Smokeless tobacco: Never     Tobacco comments:     cigar occassionally   Vaping Use     Vaping status: Never Used   Substance Use Topics     Alcohol use: Yes     Alcohol/week: 3.0 - 4.0 standard drinks of  alcohol     Drug use: No        Current Outpatient Medications   Medication Sig Dispense Refill     lisinopril-hydrochlorothiazide (ZESTORETIC) 20-25 MG tablet Take 1 tablet by mouth daily 90 tablet 1     lisinopril-hydrochlorothiazide (ZESTORETIC) 20-25 MG tablet Take 1 tablet by mouth daily 90 tablet 3          Objective:    Vitals:    06/02/23 1028   BP: 110/76   Pulse: 90   Resp: 18   Temp: 97.9  F (36.6  C)   TempSrc: Temporal   SpO2: 97%   Weight: 101.6 kg (223 lb 14.4 oz)      Body mass index is 33.06 kg/m .    Alert.  No apparent distress.  Chest clear.  Cardiac exam regular.  Pulse 68 at rest.  Abdomen benign without abdominal mass.  No guarding or rebound.  Extremities warm and dry.  Neurologic exam nonfocal.  No peripheral edema.  No rash.      CT ABDOMEN PELVIS W (5/1/22)  Order: [id number]  Impression    1.  No acute process in the abdomen or pelvis. Appendix normal.   2.  Colonic diverticulosis.   3.  Bilateral nonobstructing kidney stones.  Narrative    For Patients: As a result of the 21st Century Cures Act, medical imaging exams and procedure reports are released immediately into your electronic medical record. You may view this report before your referring provider. If you have questions, please contact your health care provider.     EXAM: CT ABDOMEN PELVIS W   LOCATION: [facility]   DATE/TIME: 5/1/2022 1:06 PM     INDICATION: 67-year-old male with right lower quadrant abdominal pain.   COMPARISON: None.   TECHNIQUE: CT scan of the abdomen and pelvis was performed following injection of IV contrast. Multiplanar reformats were obtained. Dose reduction techniques were used.   CONTRAST: IOPAMIDOL 300 MG/ML  ML BOTTLE: 100mL     FINDINGS:   LOWER CHEST: Normal.     HEPATOBILIARY: No significant mass or bile duct dilatation. No calcified gallstones.     PANCREAS: Normal.     SPLEEN: Normal.     ADRENAL GLANDS: Normal.     KIDNEYS/BLADDER: Nonobstructing 3 mm stone right kidney and 4  mm left kidney stone. No hydronephrosis or obstructing ureteral stones. Bilateral renal cysts including a mildly hyperdense cyst lower pole right kidney likely containing internal hemorrhage or debris.     BOWEL: Normal with no obstruction or acute inflammatory change. Nothing for appendicitis. Sigmoid diverticulosis, no diverticulitis.     LYMPH NODES: Normal.     VASCULATURE: Normal caliber abdominal aorta.     PELVIC ORGANS: Normal.     MUSCULOSKELETAL: Degenerative change lumbar spine.  Exam End: 05/01/22  1:06 PM           This note has been dictated using voice recognition software and as a result may contain minor grammatical errors and unintended word substitutions.   Answers for HPI/ROS submitted by the patient on 5/31/2023  How many servings of fruits and vegetables do you eat daily?: 2-3  On average, how many sweetened beverages do you drink each day (Examples: soda, juice, sweet tea, etc.  Do NOT count diet or artificially sweetened beverages)?: 0  How many minutes a day do you exercise enough to make your heart beat faster?: 30 to 60  How many days a week do you exercise enough to make your heart beat faster?: 4  How many days per week do you miss taking your medication?: 0  What is the reason for your visit today?: Stress test  When did your symptoms begin?: More than a month  What are your symptoms?: Shortness of breath  How would you describe these symptoms?: Mild  Are your symptoms:: Staying the same  Have you had these symptoms before?: No

## 2023-07-10 ENCOUNTER — HOSPITAL ENCOUNTER (OUTPATIENT)
Dept: NUCLEAR MEDICINE | Facility: HOSPITAL | Age: 68
Discharge: HOME | End: 2023-07-10
Attending: FAMILY MEDICINE
Payer: COMMERCIAL

## 2023-07-10 ENCOUNTER — HOSPITAL ENCOUNTER (OUTPATIENT)
Dept: CARDIOLOGY | Facility: HOSPITAL | Age: 68
Discharge: HOME | End: 2023-07-10
Attending: FAMILY MEDICINE
Payer: COMMERCIAL

## 2023-07-10 DIAGNOSIS — R06.09 DYSPNEA ON EXERTION: ICD-10-CM

## 2023-07-10 LAB
CV BLOOD PRESSURE: 75 MMHG
CV STRESS CURRENT BP HE: NORMAL
CV STRESS CURRENT HR HE: 102
CV STRESS CURRENT HR HE: 103
CV STRESS CURRENT HR HE: 106
CV STRESS CURRENT HR HE: 106
CV STRESS CURRENT HR HE: 107
CV STRESS CURRENT HR HE: 108
CV STRESS CURRENT HR HE: 110
CV STRESS CURRENT HR HE: 117
CV STRESS CURRENT HR HE: 119
CV STRESS CURRENT HR HE: 119
CV STRESS CURRENT HR HE: 124
CV STRESS CURRENT HR HE: 127
CV STRESS CURRENT HR HE: 129
CV STRESS CURRENT HR HE: 132
CV STRESS CURRENT HR HE: 132
CV STRESS CURRENT HR HE: 138
CV STRESS CURRENT HR HE: 139
CV STRESS CURRENT HR HE: 71
CV STRESS CURRENT HR HE: 84
CV STRESS CURRENT HR HE: 96
CV STRESS CURRENT HR HE: 97
CV STRESS CURRENT HR HE: 98
CV STRESS CURRENT HR HE: 98
CV STRESS DEVIATION TIME HE: NORMAL
CV STRESS ECHO PERCENT HR HE: NORMAL
CV STRESS EXERCISE STAGE HE: NORMAL
CV STRESS EXERCISE STAGE REACHED HE: NORMAL
CV STRESS FINAL RESTING BP HE: NORMAL
CV STRESS FINAL RESTING HR HE: 96
CV STRESS MAX HR HE: 139
CV STRESS MAX TREADMILL GRADE HE: 14
CV STRESS MAX TREADMILL SPEED HE: 3.4
CV STRESS PEAK DIA BP HE: NORMAL
CV STRESS PEAK SYS BP HE: NORMAL
CV STRESS PHASE HE: NORMAL
CV STRESS PROTOCOL HE: NORMAL
CV STRESS RESTING PT POSITION HE: NORMAL
CV STRESS RESTING PT POSITION HE: NORMAL
CV STRESS ST DEVIATION AMOUNT HE: NORMAL
CV STRESS ST DEVIATION ELEVATION HE: NORMAL
CV STRESS ST EVELATION AMOUNT HE: NORMAL
CV STRESS TEST TYPE HE: NORMAL
CV STRESS TOTAL STAGE TIME MIN 1 HE: NORMAL
RATE PRESSURE PRODUCT: NORMAL
STRESS ECHO BASELINE DIASTOLIC HE: 85
STRESS ECHO BASELINE HR: 71
STRESS ECHO BASELINE SYSTOLIC BP: 144
STRESS ECHO CALCULATED PERCENT HR: 92 %
STRESS ECHO LAST STRESS DIASTOLIC BP: 74
STRESS ECHO LAST STRESS HR: 138
STRESS ECHO LAST STRESS SYSTOLIC BP: 186
STRESS ECHO POST ESTIMATED WORKLOAD: 7.7
STRESS ECHO POST EXERCISE DUR MIN: 6
STRESS ECHO POST EXERCISE DUR SEC: 25
STRESS ECHO TARGET HR: 151

## 2023-07-10 PROCEDURE — 343N000001 HC RX 343: Performed by: FAMILY MEDICINE

## 2023-07-10 PROCEDURE — 78452 HT MUSCLE IMAGE SPECT MULT: CPT | Mod: 26 | Performed by: INTERNAL MEDICINE

## 2023-07-10 PROCEDURE — A9500 TC99M SESTAMIBI: HCPCS | Performed by: FAMILY MEDICINE

## 2023-07-10 PROCEDURE — 93017 CV STRESS TEST TRACING ONLY: CPT

## 2023-07-10 PROCEDURE — 78452 HT MUSCLE IMAGE SPECT MULT: CPT

## 2023-07-10 PROCEDURE — 93016 CV STRESS TEST SUPVJ ONLY: CPT | Performed by: INTERNAL MEDICINE

## 2023-07-10 PROCEDURE — 93018 CV STRESS TEST I&R ONLY: CPT | Performed by: INTERNAL MEDICINE

## 2023-07-10 PROCEDURE — 93226 XTRNL ECG REC<48 HR SCAN A/R: CPT

## 2023-07-10 RX ADMIN — Medication 8.3 MILLICURIE: at 08:48

## 2023-07-10 RX ADMIN — Medication 31.4 MILLICURIE: at 10:17

## 2023-07-12 PROCEDURE — 93227 XTRNL ECG REC<48 HR R&I: CPT | Performed by: INTERNAL MEDICINE

## 2023-08-28 DIAGNOSIS — I10 ESSENTIAL HYPERTENSION: ICD-10-CM

## 2023-08-28 RX ORDER — LISINOPRIL AND HYDROCHLOROTHIAZIDE 20; 25 MG/1; MG/1
1 TABLET ORAL DAILY
Qty: 90 TABLET | Refills: 0 | Status: SHIPPED | OUTPATIENT
Start: 2023-08-28 | End: 2024-01-04

## 2023-08-28 NOTE — TELEPHONE ENCOUNTER
"Routing refill request to provider for review/approval because:  Blood pressure failed    Last Written Prescription Date:  2/26/2023  Last Fill Quantity: 90,  # refills: 1   Last office visit provider:  6/2/2023     Requested Prescriptions   Pending Prescriptions Disp Refills    lisinopril-hydrochlorothiazide (ZESTORETIC) 20-25 MG tablet [Pharmacy Med Name: Lisinopril-hydroCHLOROthiazide Oral Tablet 20-25 MG] 90 tablet 0     Sig: TAKE ONE TABLET BY MOUTH ONE TIME DAILY       Diuretics (Including Combos) Protocol Passed - 8/28/2023  9:20 AM        Passed - Blood pressure under 140/90 in past 12 months     BP Readings from Last 3 Encounters:   06/02/23 110/76   05/02/23 133/82   12/02/22 (!) 130/90                 Passed - Recent (12 mo) or future (30 days) visit within the authorizing provider's specialty     Patient has had an office visit with the authorizing provider or a provider within the authorizing providers department within the previous 12 mos or has a future within next 30 days. See \"Patient Info\" tab in inbasket, or \"Choose Columns\" in Meds & Orders section of the refill encounter.              Passed - Medication is active on med list        Passed - Patient is age 18 or older        Passed - Normal serum creatinine on file in past 12 months     Recent Labs   Lab Test 06/02/23  1128   CR 1.04              Passed - Normal serum potassium on file in past 12 months     Recent Labs   Lab Test 06/02/23  1128   POTASSIUM 4.0                    Passed - Normal serum sodium on file in past 12 months     Recent Labs   Lab Test 06/02/23  1128                ACE Inhibitors (Including Combos) Protocol Passed - 8/28/2023  9:20 AM        Passed - Blood pressure under 140/90 in past 12 months     BP Readings from Last 3 Encounters:   06/02/23 110/76   05/02/23 133/82   12/02/22 (!) 130/90                 Passed - Recent (12 mo) or future (30 days) visit within the authorizing provider's specialty     Patient has " "had an office visit with the authorizing provider or a provider within the authorizing providers department within the previous 12 mos or has a future within next 30 days. See \"Patient Info\" tab in inbasket, or \"Choose Columns\" in Meds & Orders section of the refill encounter.              Passed - Medication is active on med list        Passed - Patient is age 18 or older        Passed - Normal serum creatinine on file in past 12 months     Recent Labs   Lab Test 06/02/23  1128   CR 1.04       Ok to refill medication if creatinine is low          Passed - Normal serum potassium on file in past 12 months     Recent Labs   Lab Test 06/02/23  1128   POTASSIUM 4.0                  Iliana Shaw RN 08/28/23 2:47 PM  "

## 2023-11-03 ENCOUNTER — PATIENT OUTREACH (OUTPATIENT)
Dept: CARE COORDINATION | Facility: CLINIC | Age: 68
End: 2023-11-03

## 2023-11-03 ENCOUNTER — OFFICE VISIT (OUTPATIENT)
Dept: FAMILY MEDICINE | Facility: CLINIC | Age: 68
End: 2023-11-03
Payer: COMMERCIAL

## 2023-11-03 VITALS
DIASTOLIC BLOOD PRESSURE: 80 MMHG | RESPIRATION RATE: 15 BRPM | WEIGHT: 225 LBS | TEMPERATURE: 97.2 F | HEART RATE: 87 BPM | BODY MASS INDEX: 33.33 KG/M2 | HEIGHT: 69 IN | SYSTOLIC BLOOD PRESSURE: 132 MMHG | OXYGEN SATURATION: 98 %

## 2023-11-03 DIAGNOSIS — R05.3 CHRONIC COUGH: ICD-10-CM

## 2023-11-03 DIAGNOSIS — L08.9 INFECTED SEBACEOUS CYST: Primary | ICD-10-CM

## 2023-11-03 DIAGNOSIS — I10 ESSENTIAL HYPERTENSION: ICD-10-CM

## 2023-11-03 DIAGNOSIS — L72.3 INFECTED SEBACEOUS CYST: Primary | ICD-10-CM

## 2023-11-03 PROCEDURE — 10060 I&D ABSCESS SIMPLE/SINGLE: CPT | Performed by: FAMILY MEDICINE

## 2023-11-03 PROCEDURE — 99214 OFFICE O/P EST MOD 30 MIN: CPT | Mod: 25 | Performed by: FAMILY MEDICINE

## 2023-11-03 RX ORDER — RESPIRATORY SYNCYTIAL VIRUS VACCINE 120MCG/0.5
0.5 KIT INTRAMUSCULAR ONCE
Qty: 1 EACH | Refills: 0 | Status: CANCELLED | OUTPATIENT
Start: 2023-11-03 | End: 2023-11-03

## 2023-11-03 RX ORDER — CEPHALEXIN 500 MG/1
500 CAPSULE ORAL 3 TIMES DAILY
Qty: 21 CAPSULE | Refills: 0 | Status: SHIPPED | OUTPATIENT
Start: 2023-11-03 | End: 2023-11-10

## 2023-11-03 ASSESSMENT — PAIN SCALES - GENERAL: PAINLEVEL: NO PAIN (0)

## 2023-11-03 NOTE — PROGRESS NOTES
Assessment/Plan:    Infected sebaceous cyst  Infected sebaceous cyst left posterior shoulder.  Patient prepped and draped usual sterile fashion.  1% lidocaine injected superficially.  11 blade used for incision.  Sebaceous contents removed.  Wick placed.  Dressing applied following.  Wick removal in 48 hours.  Cephalexin 500 mg 3 times daily x7 days provided.  Notify of persistent concern.  - cephALEXin (KEFLEX) 500 MG capsule  Dispense: 21 capsule; Refill: 0  - Skin Abscess, Simple [44976]    Chronic cough  Describes COVID infection in mid September.  Improved however does have some residual cough at times.  Lungs clear.  Moving air well.  No respiratory distress.  Notify if further concerns.    Essential hypertension  Hypertension appears well controlled currently with ongoing use of lisinopril hydrochlorothiazide 20/25 using 1 tablet daily.  Anticipate reassessment and annual wellness visit in 3 months               Subjective:    Otis Brumfield is seen today for cystic lesion.  Left posterior shoulder.  Has drained spontaneously a couple times in the past.  Enlarging.  Tender.  Patient does also have COVID infection diagnosed in mid September despite having COVID updated booster.  Happened while in [place].  Residual cough.  Denies history of asthma.  No significant allergies currently.  Patient with underlying hypertension.  Continues use of lisinopril hydrochlorothiazide 20/25 using 1 tablet daily.  Has not had issues with ACE inhibitor induced cough side effects historically.  Comprehensive review of systems as above otherwise all negative.  No fever.     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   1 older bro - DM   Surgeries: inguinal hernia repair ? left side; right rotator cuff surgery 2014 (Dr. Gabriele Abernathy)   Hospitalizations: none       Past Surgical History:   Procedure Laterality Date    EXCISION, FIBROMA, PLANTAR Right 9/19/2022    Procedure: CO2 laser ablation of wart right foot;  Surgeon: Rafita Chaves DPM;  Location: [facility]     REMOVAL OF TONSILS,<13 Y/O      Description: Tonsillectomy;  Recorded: 05/24/2007;    HERNIA REPAIR Left     REMOVAL OF SPERM DUCT(S)      Description: Surgery Of Male Genitalia Vasectomy;  Recorded: 05/24/2007;        Family History   Problem Relation Age of Onset    Colon Cancer Father     Diabetes Mother     Diabetes Brother         Past Medical History:   Diagnosis Date    Benign neoplasm of colon     Created by Conversion     Diverticulosis of large intestine     Created by Conversion  Replacement Utility updated for latest IMO load    Hypertension     Hypertrophic and atrophic condition of skin     Created by Conversion  Replacement Utility updated for latest IMO load        Social History     Tobacco Use    Smoking status: Light Smoker     Types: Cigars    Smokeless tobacco: Never    Tobacco comments:     cigar occassionally   Vaping Use    Vaping Use: Never used   Substance Use Topics    Alcohol use: Yes     Alcohol/week: 3.0 - 4.0 standard drinks of alcohol    Drug use: No        Current Outpatient Medications   Medication Sig Dispense Refill    cephALEXin (KEFLEX) 500 MG capsule Take 1 capsule (500 mg) by mouth 3 times daily for 7 days 21 capsule 0    lisinopril-hydrochlorothiazide (ZESTORETIC) 20-25 MG tablet TAKE ONE TABLET BY MOUTH ONE TIME DAILY 90 tablet 0          Objective:    Vitals:    11/03/23 1258   BP: 132/80   Pulse: 87   Resp: 15   Temp: 97.2  F (36.2  C)   SpO2: 98%   Weight: 102.1 kg (225 lb)   Height: 1.753 m (5' 9\")      Body mass index is 33.23 kg/m .    Alert.  No apparent distress.  Chest clear to " auscultation.  Left posterior shoulder showing inflamed sebaceous cyst lesion.  Fluctuance noted.  I&D procedure as noted tolerated well with excellent results.      This note has been dictated using voice recognition software and as a result may contain minor grammatical errors and unintended word substitutions.       Answers submitted by the patient for this visit:  General Questionnaire (Submitted on 11/3/2023)  Chief Complaint: Chronic problems general questions HPI Form  How many servings of fruits and vegetables do you eat daily?: 2-3  On average, how many sweetened beverages do you drink each day (Examples: soda, juice, sweet tea, etc.  Do NOT count diet or artificially sweetened beverages)?: 1  How many minutes a day do you exercise enough to make your heart beat faster?: 30 to 60  How many days a week do you exercise enough to make your heart beat faster?: 4  How many days per week do you miss taking your medication?: 0  General Concern (Submitted on 11/3/2023)  Chief Complaint: Chronic problems general questions HPI Form  What is the reason for your visit today?: Cyst removal from shoulder area And discuss persistent cough  When did your symptoms begin?: 3-7 days ago  How would you describe these symptoms?: Mild  Are your symptoms:: Staying the same  Have you had these symptoms before?: No  Is there anything that makes you feel worse?: No  Is there anything that makes you feel better?: No

## 2023-11-07 ENCOUNTER — PATIENT OUTREACH (OUTPATIENT)
Dept: GASTROENTEROLOGY | Facility: CLINIC | Age: 68
End: 2023-11-07
Payer: COMMERCIAL

## 2023-11-17 ENCOUNTER — PATIENT OUTREACH (OUTPATIENT)
Dept: CARE COORDINATION | Facility: CLINIC | Age: 68
End: 2023-11-17
Payer: COMMERCIAL

## 2024-01-03 DIAGNOSIS — I10 ESSENTIAL HYPERTENSION: ICD-10-CM

## 2024-01-04 RX ORDER — LISINOPRIL AND HYDROCHLOROTHIAZIDE 20; 25 MG/1; MG/1
1 TABLET ORAL DAILY
Qty: 90 TABLET | Refills: 0 | Status: SHIPPED | OUTPATIENT
Start: 2024-01-04 | End: 2024-04-01

## 2024-02-23 ENCOUNTER — TRANSFERRED RECORDS (OUTPATIENT)
Dept: HEALTH INFORMATION MANAGEMENT | Facility: CLINIC | Age: 69
End: 2024-02-23
Payer: COMMERCIAL

## 2024-03-02 ENCOUNTER — HEALTH MAINTENANCE LETTER (OUTPATIENT)
Age: 69
End: 2024-03-02

## 2024-03-30 DIAGNOSIS — I10 ESSENTIAL HYPERTENSION: ICD-10-CM

## 2024-04-01 RX ORDER — LISINOPRIL AND HYDROCHLOROTHIAZIDE 20; 25 MG/1; MG/1
1 TABLET ORAL DAILY
Qty: 90 TABLET | Refills: 1 | Status: SHIPPED | OUTPATIENT
Start: 2024-04-01 | End: 2024-04-18

## 2024-04-17 SDOH — HEALTH STABILITY: PHYSICAL HEALTH: ON AVERAGE, HOW MANY MINUTES DO YOU ENGAGE IN EXERCISE AT THIS LEVEL?: 60 MIN

## 2024-04-17 SDOH — HEALTH STABILITY: PHYSICAL HEALTH: ON AVERAGE, HOW MANY DAYS PER WEEK DO YOU ENGAGE IN MODERATE TO STRENUOUS EXERCISE (LIKE A BRISK WALK)?: 3 DAYS

## 2024-04-17 ASSESSMENT — SOCIAL DETERMINANTS OF HEALTH (SDOH): HOW OFTEN DO YOU GET TOGETHER WITH FRIENDS OR RELATIVES?: TWICE A WEEK

## 2024-04-18 ENCOUNTER — OFFICE VISIT (OUTPATIENT)
Dept: FAMILY MEDICINE | Facility: CLINIC | Age: 69
End: 2024-04-18
Payer: COMMERCIAL

## 2024-04-18 VITALS
RESPIRATION RATE: 16 BRPM | HEIGHT: 69 IN | WEIGHT: 229 LBS | HEART RATE: 89 BPM | BODY MASS INDEX: 33.92 KG/M2 | SYSTOLIC BLOOD PRESSURE: 120 MMHG | TEMPERATURE: 97.4 F | OXYGEN SATURATION: 98 % | DIASTOLIC BLOOD PRESSURE: 70 MMHG

## 2024-04-18 DIAGNOSIS — Z00.00 MEDICARE ANNUAL WELLNESS VISIT, SUBSEQUENT: Primary | ICD-10-CM

## 2024-04-18 DIAGNOSIS — Z12.5 SCREENING FOR PROSTATE CANCER: ICD-10-CM

## 2024-04-18 DIAGNOSIS — D12.6 TUBULAR ADENOMA OF COLON: ICD-10-CM

## 2024-04-18 DIAGNOSIS — E66.811 CLASS 1 OBESITY WITH SERIOUS COMORBIDITY AND BODY MASS INDEX (BMI) OF 33.0 TO 33.9 IN ADULT, UNSPECIFIED OBESITY TYPE: ICD-10-CM

## 2024-04-18 DIAGNOSIS — R73.01 IMPAIRED FASTING GLUCOSE: ICD-10-CM

## 2024-04-18 DIAGNOSIS — R06.83 SNORING: ICD-10-CM

## 2024-04-18 DIAGNOSIS — Z23 ENCOUNTER FOR IMMUNIZATION: ICD-10-CM

## 2024-04-18 DIAGNOSIS — G95.20 CERVICAL SPINAL CORD COMPRESSION (H): ICD-10-CM

## 2024-04-18 DIAGNOSIS — I10 ESSENTIAL HYPERTENSION: ICD-10-CM

## 2024-04-18 LAB — HBA1C MFR BLD: 5.7 % (ref 0–5.6)

## 2024-04-18 PROCEDURE — G0439 PPPS, SUBSEQ VISIT: HCPCS | Performed by: FAMILY MEDICINE

## 2024-04-18 PROCEDURE — 80048 BASIC METABOLIC PNL TOTAL CA: CPT | Performed by: FAMILY MEDICINE

## 2024-04-18 PROCEDURE — 99213 OFFICE O/P EST LOW 20 MIN: CPT | Mod: 25 | Performed by: FAMILY MEDICINE

## 2024-04-18 PROCEDURE — 80061 LIPID PANEL: CPT | Performed by: FAMILY MEDICINE

## 2024-04-18 PROCEDURE — 90480 ADMN SARSCOV2 VAC 1/ONLY CMP: CPT | Performed by: FAMILY MEDICINE

## 2024-04-18 PROCEDURE — 83036 HEMOGLOBIN GLYCOSYLATED A1C: CPT | Performed by: FAMILY MEDICINE

## 2024-04-18 PROCEDURE — 91320 SARSCV2 VAC 30MCG TRS-SUC IM: CPT | Performed by: FAMILY MEDICINE

## 2024-04-18 PROCEDURE — 36415 COLL VENOUS BLD VENIPUNCTURE: CPT | Performed by: FAMILY MEDICINE

## 2024-04-18 PROCEDURE — G0103 PSA SCREENING: HCPCS | Performed by: FAMILY MEDICINE

## 2024-04-18 RX ORDER — LISINOPRIL AND HYDROCHLOROTHIAZIDE 20; 25 MG/1; MG/1
1 TABLET ORAL DAILY
Qty: 90 TABLET | Refills: 3 | Status: SHIPPED | OUTPATIENT
Start: 2024-04-18

## 2024-04-18 ASSESSMENT — PAIN SCALES - GENERAL: PAINLEVEL: NO PAIN (0)

## 2024-04-18 NOTE — PROGRESS NOTES
Preventive Care Visit  [facility]  Terry Posadas MD, Family Medicine  Apr 18, 2024      Assessment & Plan     Medicare annual wellness visit, subsequent  Annual wellness visit completed.  Risk questionnaire reviewed in detail.  Annual wellness visits to continue.    Class 1 obesity with serious comorbidity and body mass index (BMI) of 33.0 to 33.9 in adult, unspecified obesity type  Dietary and exercise modification for weight goal less than 210 pounds initially, less than 200 pounds ideally.    Essential hypertension  Hypertension appears well-controlled blood pressure 126/76 on recheck.  Lisinopril hydrochlorothiazide 20/25 using 1 tablet daily with reassessment at annual wellness visit in 1 year.  Medication monitoring completed.  - lisinopril-hydrochlorothiazide (ZESTORETIC) 20-25 MG tablet  Dispense: 90 tablet; Refill: 3  - Basic metabolic panel  - Lipid panel reflex to direct LDL Non-fasting    Snoring  Snoring historically.  Describes improvement and never completed sleep study and declines further assessment.    Cervical spinal cord compression (H)  Known history of cervical spinal cord compression without residual concern.    Encounter for immunization  Dated COVID booster to be provided today based on age criteria.  - COVID-19 12+ (2023-24) (PFIZER)    Screening for prostate cancer  PSA for prostate cancer screening.  - Prostate Specific Antigen Screen    Impaired fasting glucose  Impaired fasting glucose.  A1c updated today.  Nonfasting status.  - Basic metabolic panel  - Hemoglobin A1c    Tubular adenoma of colon  Colonoscopy August 8, 2019 with tubular adenoma noted.  5-year follow-up recommendation as well as family history of colon cancer in patient's father.      Patient has been advised of split billing requirements and indicates understanding: Yes          Nicotine/Tobacco Cessation  He reports that he has been smoking cigars. He has never used smokeless  "tobacco.  Nicotine/Tobacco Cessation Plan  Information offered: Patient not interested at this time      BMI  Estimated body mass index is 33.82 kg/m  as calculated from the following:    Height as of this encounter: 1.753 m (5' 9\").    Weight as of this encounter: 103.9 kg (229 lb).   Weight management plan: Discussed healthy diet and exercise guidelines    Counseling  Appropriate preventive services were discussed with this patient, including applicable screening as appropriate for fall prevention, nutrition, physical activity, Tobacco-use cessation, weight loss and cognition.  Checklist reviewing preventive services available has been given to the patient.  Reviewed patient's diet, addressing concerns and/or questions.   He is at risk for lack of exercise and has been provided with information to increase physical activity for the benefit of his well-being.           Rose Monae is a 69 year old, presenting for the following:  Medicare Visit (Pt is not fasting)        4/18/2024     1:59 PM   Additional Questions   Roomed by          Health Care Directive  Patient does not have a Health Care Directive or Living Will: Patient states has Advance Directive and will bring in a copy to clinic.    HPI    Patient seen for annual wellness visit.  In general doing well.  Hypertension treated with lisinopril hydrochlorothiazide 20/25 daily.  Snoring historically.  Sleep study referral in the past but never completed.  States improvement noted.  Prior cough associated with COVID infection has resolved.  Tubular adenoma on colonoscopy August 8, 2019 with 5-year follow-up recommendation.  Patient's father also with colon cancer in his 50s.  Denies other recent illness.  Retired x 4 years.  Not getting consistent exercise.  Comprehensive review of systems as above otherwise all negative.     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   1 older bro - DM   Surgeries: inguinal hernia repair ? left side; right rotator cuff surgery  (Dr. Gabriele Abernathy)   Hospitalizations: none           2024   General Health   How would you rate your overall physical health? Excellent   Feel stress (tense, anxious, or unable to sleep) Not at all         2024   Nutrition   Diet: Regular (no restrictions)         2024   Exercise   Days per week of moderate/strenous exercise 3 days   Average minutes spent exercising at this level 60 min         2024   Social Factors   Frequency of gathering with friends or relatives Twice a week   Worry food won't last until get money to buy more No   Food not last or not have enough money for food? No   Do you have housing?  Yes   Are you worried about losing your housing? No   Lack of transportation? No   Unable to get utilities (heat,electricity)? No         2024   Fall Risk   Fallen 2 or more times in the past year? No   Trouble with walking or balance? No          2024   Activities of Daily Living- Home Safety   Needs help with the following daily activites None of the above   Safety concerns in the home None of the above         2024   Dental   Dentist two times every year? Yes         2024   Hearing Screening   Hearing concerns? None of the above         2024   Driving Risk Screening   Patient/family members have concerns about driving No         2024   General Alertness/Fatigue Screening   Have you been more tired than usual lately? No         2024   Urinary Incontinence Screening   Bothered by leaking urine in past 6 months No         2024   TB Screening   Were you born outside of the [place]? No         Today's PHQ-2 Score:        2024     4:10 PM   PHQ-2 (  [employer])   Q1: Little interest or pleasure in doing things 0   Q2: Feeling down, depressed or hopeless 0   PHQ-2 Score 0   Q1: Little interest or pleasure in doing things Not at all   Q2: Feeling down, depressed or hopeless Not at all   PHQ-2 Score 0           2024   Substance Use   Alcohol more than 3/day or more than 7/wk No   Do you have a current opioid prescription? No   How severe/bad is pain from 1 to 10? 1/10   Do you use any other substances recreationally? No     Social History     Tobacco Use    Smoking status: Light Smoker     Types: Cigars    Smokeless tobacco: Never    Tobacco comments:     cigar occassionally   Vaping Use    Vaping status: Never Used   Substance Use Topics    Alcohol use: Yes     Alcohol/week: 3.0 - 4.0 standard drinks of alcohol    Drug use: No       ASCVD Risk   The 10-year ASCVD risk score (Ruth MEDRANO, et al., 2019) is: 22.3%    Values used to calculate the score:      Age: 69 years      Sex: Male      Is Non- : No      Diabetic: No      Tobacco smoker: Yes      Systolic Blood Pressure: 120 mmHg      Is BP treated: Yes      HDL Cholesterol: 42 mg/dL      Total Cholesterol: 165 mg/dL    Fracture Risk Assessment Tool  Link to Frax Calculator  Use the information below to complete the Frax calculator  : 1955  Sex: male  Weight (kg): 103.9 kg (actual weight)  Height (cm): 175.3 cm  Previous Fragility Fracture:  No  History of parent with fractured hip:  No  Current Smoking:  Yes  Patient has been on glucocorticoids for more than 3 months (5mg/day or more): No  Rheumatoid Arthritis on Problem List:  No  Secondary Osteoporosis on Problem List:  No  Consumes 3 or more units of alcohol per day: No  Femoral Neck BMD (g/cm2)            Reviewed and updated as needed this visit by Provider                    Past Medical History:   Diagnosis Date    Benign neoplasm of colon     Created by Conversion      Diverticulosis of large intestine     Created by Conversion  Replacement Utility updated for latest IMO load    Hypertension     Hypertrophic and atrophic condition of skin     Created by Conversion  Replacement Utility updated for latest IMO load     Past Surgical History:   Procedure Laterality Date    EXCISION, FIBROMA, PLANTAR Right 9/19/2022    Procedure: CO2 laser ablation of wart right foot;  Surgeon: Rafita Chaves DPM;  Location: [facility]     REMOVAL OF TONSILS,<11 Y/O      Description: Tonsillectomy;  Recorded: 05/24/2007;    HERNIA REPAIR Left     REMOVAL OF SPERM DUCT(S)      Description: Surgery Of Male Genitalia Vasectomy;  Recorded: 05/24/2007;     Lab work is in process  Labs reviewed in EPIC  BP Readings from Last 3 Encounters:   04/18/24 120/70   11/03/23 132/80   06/02/23 110/76    Wt Readings from Last 3 Encounters:   04/18/24 103.9 kg (229 lb)   11/03/23 102.1 kg (225 lb)   06/02/23 101.6 kg (223 lb 14.4 oz)                  Patient Active Problem List   Diagnosis    Benign Polyps Of The Large Intestine    Diverticulosis    Hypertension    Acrochordon    Acute Meniscal Tear    Joint Pain, Localized In The Right Shoulder    Pneumonia    Hypertensive heart disease without heart failure    Plantar warts    Cervical spinal cord compression (H)     Past Surgical History:   Procedure Laterality Date    EXCISION, FIBROMA, PLANTAR Right 9/19/2022    Procedure: CO2 laser ablation of wart right foot;  Surgeon: Rafita Chaves DPM;  Location: [facility]     REMOVAL OF TONSILS,<11 Y/O      Description: Tonsillectomy;  Recorded: 05/24/2007;    HERNIA REPAIR Left     REMOVAL OF SPERM DUCT(S)      Description: Surgery Of Male Genitalia Vasectomy;  Recorded: 05/24/2007;       Social History     Tobacco Use    Smoking status: Light Smoker     Types: Cigars    Smokeless tobacco: Never    Tobacco comments:     cigar occassionally   Substance Use Topics    Alcohol use: Yes     Alcohol/week:  "3.0 - 4.0 standard drinks of alcohol     Family History   Problem Relation Age of Onset    Colon Cancer Father     Diabetes Mother     Diabetes Brother          Current Outpatient Medications   Medication Sig Dispense Refill    lisinopril-hydrochlorothiazide (ZESTORETIC) 20-25 MG tablet Take 1 tablet by mouth daily 90 tablet 3     No Known Allergies  Current providers sharing in care for this patient include:  Patient Care Team:  Terry Posadas MD as PCP - General (Family Medicine)  Terry Posadas MD as Assigned PCP  Rafita Chaves DPM as Assigned Surgical Provider  Keri Anne PA-C as Assigned Musculoskeletal Provider    The following health maintenance items are reviewed in Epic and correct as of today:  Health Maintenance   Topic Date Due    LUNG CANCER SCREENING  03/24/2017    COVID-19 Vaccine (7 - 2023-24 season) 10/09/2023    COLORECTAL CANCER SCREENING  08/08/2024    NICOTINE/TOBACCO CESSATION COUNSELING Q 1 YR  04/18/2025    MEDICARE ANNUAL WELLNESS VISIT  04/18/2025    ANNUAL REVIEW OF HM ORDERS  04/18/2025    FALL RISK ASSESSMENT  04/18/2025    GLUCOSE  06/02/2026    LIPID  12/02/2027    ADVANCE CARE PLANNING  12/02/2027    DTAP/TDAP/TD IMMUNIZATION (4 - Td or Tdap) 05/03/2032    HEPATITIS C SCREENING  Completed    PHQ-2 (once per calendar year)  Completed    INFLUENZA VACCINE  Completed    Pneumococcal Vaccine: 65+ Years  Completed    ZOSTER IMMUNIZATION  Completed    RSV VACCINE (Pregnancy & 60+)  Completed    AORTIC ANEURYSM SCREENING (SYSTEM ASSIGNED)  Completed    IPV IMMUNIZATION  Aged Out    HPV IMMUNIZATION  Aged Out    MENINGITIS IMMUNIZATION  Aged Out    RSV MONOCLONAL ANTIBODY  Aged Out         Review of Systems  Constitutional, HEENT, cardiovascular, pulmonary, GI, , musculoskeletal, neuro, skin, endocrine and psych systems are negative, except as otherwise noted.     Objective    Exam  /70   Pulse 89   Temp 97.4  F (36.3  C)   Resp 16   Ht 1.753 m (5' 9\")   Wt 103.9 kg " "(229 lb)   SpO2 98%   BMI 33.82 kg/m     Estimated body mass index is 33.82 kg/m  as calculated from the following:    Height as of this encounter: 1.753 m (5' 9\").    Weight as of this encounter: 103.9 kg (229 lb).    Physical Exam    GENERAL: alert and no distress  EYES: Eyes grossly normal to inspection, PERRL and conjunctivae and sclerae normal.  Glasses.  HENT: ear canals and TM's normal, nose and mouth without ulcers or lesions  NECK: no adenopathy, no asymmetry, masses, or scars  RESP: lungs clear to auscultation - no rales, rhonchi or wheezes  CV: regular rate and rhythm, normal S1 S2, no S3 or S4, no murmur, click or rub, no peripheral edema  ABDOMEN: soft, nontender, no hepatosplenomegaly, no masses and bowel sounds normal   (male): normal male genitalia without lesions or urethral discharge, no hernia  RECTAL: normal sphincter tone, no rectal masses, prostate normal size, smooth, nontender without nodules or masses  MS: no gross musculoskeletal defects noted, no edema  SKIN: no suspicious lesions or rashes  NEURO: Normal strength and tone, mentation intact and speech normal  PSYCH: mentation appears normal, affect normal/bright        4/18/2024   Mini Cog   Clock Draw Score 2 Normal   3 Item Recall 3 objects recalled   Mini Cog Total Score 5              Signed Electronically by: Terry Posadas MD    "

## 2024-04-20 LAB
ANION GAP SERPL CALCULATED.3IONS-SCNC: 12 MMOL/L (ref 7–15)
BUN SERPL-MCNC: 18.7 MG/DL (ref 8–23)
CALCIUM SERPL-MCNC: 10.4 MG/DL (ref 8.8–10.2)
CHLORIDE SERPL-SCNC: 102 MMOL/L (ref 98–107)
CHOLEST SERPL-MCNC: 146 MG/DL
CREAT SERPL-MCNC: 1.15 MG/DL (ref 0.67–1.17)
DEPRECATED HCO3 PLAS-SCNC: 24 MMOL/L (ref 22–29)
EGFRCR SERPLBLD CKD-EPI 2021: 68 ML/MIN/1.73M2
FASTING STATUS PATIENT QL REPORTED: ABNORMAL
GLUCOSE SERPL-MCNC: 84 MG/DL (ref 70–99)
HDLC SERPL-MCNC: 38 MG/DL
LDLC SERPL CALC-MCNC: 70 MG/DL
NONHDLC SERPL-MCNC: 108 MG/DL
POTASSIUM SERPL-SCNC: 4.1 MMOL/L (ref 3.4–5.3)
PSA SERPL DL<=0.01 NG/ML-MCNC: 0.89 NG/ML (ref 0–6.5)
SODIUM SERPL-SCNC: 138 MMOL/L (ref 135–145)
TRIGL SERPL-MCNC: 190 MG/DL

## 2024-05-08 ENCOUNTER — PATIENT OUTREACH (OUTPATIENT)
Dept: GASTROENTEROLOGY | Facility: CLINIC | Age: 69
End: 2024-05-08
Payer: COMMERCIAL

## 2024-05-08 DIAGNOSIS — Z12.11 SPECIAL SCREENING FOR MALIGNANT NEOPLASMS, COLON: Primary | ICD-10-CM

## 2024-05-08 NOTE — PROGRESS NOTES
"CRC Screening Colonoscopy Referral Review    Patient meets the inclusion criteria for screening colonoscopy standing order.    Ordering/Referring Provider:  Terry Posadas      BMI: Estimated body mass index is 33.82 kg/m  as calculated from the following:    Height as of 4/18/24: 1.753 m (5' 9\").    Weight as of 4/18/24: 103.9 kg (229 lb).     Sedation:  Does patient have any of the following conditions affecting sedation?  No medical conditions affecting sedation.    Previous Scopes:  Any previous recommendations or follow up needs based on previous scope?  na / No recommendations.    Medical Concerns to Postpone Order:  Does patient have any of the following medical concerns that should postpone/delay colonoscopy referral?  No medical conditions affecting colonoscopy referral.    Final Referral Details:  Based on patient's medical history patient is appropriate for referral order with moderate sedation. If patient's BMI > 50 do not schedule in ASC.  "

## 2024-08-20 ENCOUNTER — TRANSFERRED RECORDS (OUTPATIENT)
Dept: HEALTH INFORMATION MANAGEMENT | Facility: CLINIC | Age: 69
End: 2024-08-20
Payer: COMMERCIAL

## 2025-03-27 ENCOUNTER — PATIENT OUTREACH (OUTPATIENT)
Dept: CARE COORDINATION | Facility: CLINIC | Age: 70
End: 2025-03-27
Payer: COMMERCIAL

## 2025-04-11 ENCOUNTER — TRANSFERRED RECORDS (OUTPATIENT)
Dept: HEALTH INFORMATION MANAGEMENT | Facility: CLINIC | Age: 70
End: 2025-04-11
Payer: COMMERCIAL

## 2025-04-17 SDOH — HEALTH STABILITY: PHYSICAL HEALTH: ON AVERAGE, HOW MANY DAYS PER WEEK DO YOU ENGAGE IN MODERATE TO STRENUOUS EXERCISE (LIKE A BRISK WALK)?: 4 DAYS

## 2025-04-17 SDOH — HEALTH STABILITY: PHYSICAL HEALTH: ON AVERAGE, HOW MANY MINUTES DO YOU ENGAGE IN EXERCISE AT THIS LEVEL?: 60 MIN

## 2025-04-17 ASSESSMENT — SOCIAL DETERMINANTS OF HEALTH (SDOH): HOW OFTEN DO YOU GET TOGETHER WITH FRIENDS OR RELATIVES?: TWICE A WEEK

## 2025-04-22 ENCOUNTER — OFFICE VISIT (OUTPATIENT)
Dept: FAMILY MEDICINE | Facility: CLINIC | Age: 70
End: 2025-04-22
Payer: COMMERCIAL

## 2025-04-22 VITALS
OXYGEN SATURATION: 95 % | DIASTOLIC BLOOD PRESSURE: 80 MMHG | HEART RATE: 89 BPM | WEIGHT: 226 LBS | TEMPERATURE: 98.5 F | RESPIRATION RATE: 14 BRPM | HEIGHT: 69 IN | BODY MASS INDEX: 33.47 KG/M2 | SYSTOLIC BLOOD PRESSURE: 136 MMHG

## 2025-04-22 DIAGNOSIS — Z00.00 MEDICARE ANNUAL WELLNESS VISIT, SUBSEQUENT: Primary | ICD-10-CM

## 2025-04-22 DIAGNOSIS — R73.01 IMPAIRED FASTING GLUCOSE: ICD-10-CM

## 2025-04-22 DIAGNOSIS — G95.20 CERVICAL SPINAL CORD COMPRESSION (H): ICD-10-CM

## 2025-04-22 DIAGNOSIS — R06.83 SNORING: ICD-10-CM

## 2025-04-22 DIAGNOSIS — I10 ESSENTIAL HYPERTENSION: ICD-10-CM

## 2025-04-22 DIAGNOSIS — D12.6 TUBULAR ADENOMA OF COLON: ICD-10-CM

## 2025-04-22 DIAGNOSIS — E78.5 DYSLIPIDEMIA: ICD-10-CM

## 2025-04-22 DIAGNOSIS — E66.811 CLASS 1 OBESITY WITH SERIOUS COMORBIDITY AND BODY MASS INDEX (BMI) OF 33.0 TO 33.9 IN ADULT, UNSPECIFIED OBESITY TYPE: ICD-10-CM

## 2025-04-22 LAB
EST. AVERAGE GLUCOSE BLD GHB EST-MCNC: 114 MG/DL
HBA1C MFR BLD: 5.6 % (ref 0–5.6)

## 2025-04-22 PROCEDURE — 3079F DIAST BP 80-89 MM HG: CPT | Performed by: FAMILY MEDICINE

## 2025-04-22 PROCEDURE — 83036 HEMOGLOBIN GLYCOSYLATED A1C: CPT | Performed by: FAMILY MEDICINE

## 2025-04-22 PROCEDURE — 36415 COLL VENOUS BLD VENIPUNCTURE: CPT | Performed by: FAMILY MEDICINE

## 2025-04-22 PROCEDURE — 80048 BASIC METABOLIC PNL TOTAL CA: CPT | Performed by: FAMILY MEDICINE

## 2025-04-22 PROCEDURE — 3075F SYST BP GE 130 - 139MM HG: CPT | Performed by: FAMILY MEDICINE

## 2025-04-22 PROCEDURE — 1126F AMNT PAIN NOTED NONE PRSNT: CPT | Performed by: FAMILY MEDICINE

## 2025-04-22 PROCEDURE — G0439 PPPS, SUBSEQ VISIT: HCPCS | Performed by: FAMILY MEDICINE

## 2025-04-22 PROCEDURE — 80061 LIPID PANEL: CPT | Performed by: FAMILY MEDICINE

## 2025-04-22 PROCEDURE — 99213 OFFICE O/P EST LOW 20 MIN: CPT | Mod: 25 | Performed by: FAMILY MEDICINE

## 2025-04-22 RX ORDER — LISINOPRIL AND HYDROCHLOROTHIAZIDE 20; 25 MG/1; MG/1
1 TABLET ORAL DAILY
Qty: 90 TABLET | Refills: 3 | Status: SHIPPED | OUTPATIENT
Start: 2025-04-22

## 2025-04-22 ASSESSMENT — PAIN SCALES - GENERAL: PAINLEVEL_OUTOF10: NO PAIN (0)

## 2025-04-22 NOTE — PROGRESS NOTES
Preventive Care Visit  [facility]  Terry Posadas MD, Family Medicine  Apr 22, 2025      Assessment & Plan     Medicare annual wellness visit, subsequent  Annual Wellness Visit completed.  Risk questionaire reviewed in detail.  Appropriate preventive services were discussed with this patient, including applicable screening as appropriate for fall prevention, nutrition, physical activity, tobacco-use cessation, weight loss, and cognition.  Annual Wellness Visits recommended to continue.     Class 1 obesity with serious comorbidity and body mass index (BMI) of 33.0 to 33.9 in adult, unspecified obesity type  Dietary and exercise modification for weight goal less than 210 pounds initially, less than 200 pounds ideally.    Essential hypertension  Hypertension fair control blood pressure 134/76 with lisinopril hydrochlorothiazide 20/25 daily.  - Basic metabolic panel  - lisinopril-hydrochlorothiazide (ZESTORETIC) 20-25 MG tablet  Dispense: 90 tablet; Refill: 3  - Basic metabolic panel    Impaired fasting glucose  Impaired fasting glucose.  A1c updated as well as glucose.  Small amount of milk with coffee this morning.  - Basic metabolic panel  - Hemoglobin A1c  - Basic metabolic panel  - Hemoglobin A1c    Tubular adenoma of colon  Tubular adenoma of colon.  Colonoscopy August 20, 2024 with recommendation for 5-year follow-up.    Dyslipidemia  Dyslipidemia.  Small amount of milk with coffee this morning.  Lipid cascade updated.  - Lipid panel reflex to direct LDL Fasting  - Lipid panel reflex to direct LDL Fasting    Snoring  Snoring historically.  Seems to sleep okay and does not have significant daytime somnolence.  Sleep study will be completed in order to rule out obstructive sleep apnea.  - Adult Sleep Eval & Management  Referral    Cervical spine cord compression  An MRI cervical spine from January 2021 showed multilevel cervical spondylosis most pronounced at C3-4 where there is  "severe spinal stenosis.  There is also moderate spinal canal stenosis C5-6 and multilevel high-grade bilateral foraminal stenosis C3-4 through C6-7.  Patient saw Dr. Hylton February 2021.  She recommended a C3-C6 ACDF.  Patient opted for physical therapy instead which was very helpful.     Patient has been advised of split billing requirements and indicates understanding: Yes        Nicotine/Tobacco Cessation  He reports that he has been smoking cigars. He has never used smokeless tobacco.  Nicotine/Tobacco Cessation Plan  Information offered: Patient not interested at this time      BMI  Estimated body mass index is 33.13 kg/m  as calculated from the following:    Height as of this encounter: 1.759 m (5' 9.25\").    Weight as of this encounter: 102.5 kg (226 lb).   Weight management plan: Discussed healthy diet and exercise guidelines    Counseling  Appropriate preventive services were addressed with this patient via screening, questionnaire, or discussion as appropriate for fall prevention, nutrition, physical activity, Tobacco-use cessation, social engagement, weight loss and cognition.  Checklist reviewing preventive services available has been given to the patient.  Reviewed patient's diet, addressing concerns and/or questions.           Rose Monae is a 70 year old, presenting for the following:  Medicare Visit (Pt is not fasting)        4/22/2025     7:31 AM   Additional Questions   Roomed by [facility]    Patient seen today for annual wellness visit.  Questions regarding need for RSV vaccination however it does appear that he has received RSV vaccination January 4, 2024.  Hypertension treated with lisinopril hydrochlorothiazide 20/25 daily.  Impaired fasting glucose historically.  Mild dyslipidemia as well.  Had small amount of milk with coffee this morning otherwise fasting per patient.  Denies recent illness.  Past medical social and family history reviewed.  Will be visiting [place] for upcoming " "travel scheduled.  No concerns from nocturia.  Comprehensive review of systems as above otherwise all negative.       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   Dad -  94 renal failure; h/o colon cancer (dxd ~ age 50) occ gout...   1 older bro - DM   Surgeries: inguinal hernia repair ? left side; right rotator cuff surgery  (Dr. Gabriele Abernathy)   Hospitalizations: none           Advance Care Planning    Patient states has Health Care Directive and will send to [facility].        2025   General Health   How would you rate your overall physical health? Good   Feel stress (tense, anxious, or unable to sleep) Only a little   (!) STRESS CONCERN      2025   Nutrition   Diet: Regular (no restrictions)         2025   Exercise   Days per week of moderate/strenous exercise 4 days   Average minutes spent exercising at this level 60 min         2025   Social Factors   Frequency of gathering with friends or relatives Twice a week   Worry food won't last until get money to buy more No   Food not last or not have enough money for food? No   Do you have housing? (Housing is defined as stable permanent housing and does not include staying ouside in a car, in a tent, in an abandoned building, in an overnight shelter, or couch-surfing.) Yes   Are you worried about losing your housing? No   Lack of transportation? No   Unable to get utilities (heat,electricity)? No         2025   Fall Risk   Fallen 2 or more times in the past year? No   Trouble with walking or balance? No          2025   Activities of Daily Living- Home Safety   Needs help with the following daily activites None of the above   Safety concerns in the home " None of the above         4/17/2025   Dental   Dentist two times every year? Yes         4/17/2025   Hearing Screening   Hearing concerns? None of the above         4/17/2025   Driving Risk Screening   Patient/family members have concerns about driving No         4/17/2025   General Alertness/Fatigue Screening   Have you been more tired than usual lately? No         4/17/2025   Urinary Incontinence Screening   Bothered by leaking urine in past 6 months No         Today's PHQ-2 Score:       4/22/2025     7:18 AM   PHQ-2 ( 1999 [employer])   Q1: Little interest or pleasure in doing things 0   Q2: Feeling down, depressed or hopeless 0   PHQ-2 Score 0    Q1: Little interest or pleasure in doing things Not at all   Q2: Feeling down, depressed or hopeless Not at all   PHQ-2 Score 0       Patient-reported           4/17/2025   Substance Use   If I could quit smoking, I would Completely agree   I want to quit somking, worry about health affects Completely agree   Willing to make a plan to quit smoking Completely agree   Willing to cut down before quitting Completely agree   Alcohol more than 3/day or more than 7/wk No   Do you have a current opioid prescription? No   How severe/bad is pain from 1 to 10? 1/10   Do you use any other substances recreationally? No     Social History     Tobacco Use    Smoking status: Light Smoker     Types: Cigars    Smokeless tobacco: Never    Tobacco comments:     cigar occassionally   Vaping Use    Vaping status: Never Used   Substance Use Topics    Alcohol use: Yes     Alcohol/week: 3.0 - 4.0 standard drinks of alcohol    Drug use: No       ASCVD Risk   The 10-year ASCVD risk score (Ruth MEDRANO, et al., 2019) is: 27.9%    Values used to calculate the score:      Age: 70 years      Sex: Male      Is Non- : No      Diabetic: No      Tobacco smoker: Yes      Systolic Blood Pressure: 136 mmHg      Is BP treated: Yes      HDL Cholesterol: 38 mg/dL      Total  Cholesterol: 146 mg/dL            Reviewed and updated as needed this visit by Provider                    Past Medical History:   Diagnosis Date    Benign neoplasm of colon     Created by Conversion     Diverticulosis of large intestine     Created by Conversion  Replacement Utility updated for latest IMO load    Hypertension     Hypertrophic and atrophic condition of skin     Created by Conversion  Replacement Utility updated for latest IMO load     Past Surgical History:   Procedure Laterality Date    EXCISION, FIBROMA, PLANTAR Right 9/19/2022    Procedure: CO2 laser ablation of wart right foot;  Surgeon: Rafita Chaves DPM;  Location: [facility]    HC REMOVAL OF TONSILS,<11 Y/O      Description: Tonsillectomy;  Recorded: 05/24/2007;    HERNIA REPAIR Left     REMOVAL OF SPERM DUCT(S)      Description: Surgery Of Male Genitalia Vasectomy;  Recorded: 05/24/2007;     Lab work is in process  Labs reviewed in EPIC  BP Readings from Last 3 Encounters:   04/22/25 136/80   04/18/24 120/70   11/03/23 132/80    Wt Readings from Last 3 Encounters:   04/22/25 102.5 kg (226 lb)   04/18/24 103.9 kg (229 lb)   11/03/23 102.1 kg (225 lb)                  Patient Active Problem List   Diagnosis    Benign Polyps Of The Large Intestine    Diverticulosis    Hypertension    Acrochordon    Acute Meniscal Tear    Joint Pain, Localized In The Right Shoulder    Pneumonia    Hypertensive heart disease without heart failure    Plantar warts    Cervical spinal cord compression (H)     Past Surgical History:   Procedure Laterality Date    EXCISION, FIBROMA, PLANTAR Right 9/19/2022    Procedure: CO2 laser ablation of wart right foot;  Surgeon: Rafita Chaves DPM;  Location: [facility]    HC REMOVAL OF TONSILS,<11 Y/O      Description: Tonsillectomy;  Recorded: 05/24/2007;    HERNIA REPAIR Left     REMOVAL OF SPERM DUCT(S)      Description: Surgery Of Male Genitalia Vasectomy;  Recorded: 05/24/2007;       Social History  "    Tobacco Use    Smoking status: Light Smoker     Types: Cigars    Smokeless tobacco: Never    Tobacco comments:     cigar occassionally   Substance Use Topics    Alcohol use: Yes     Alcohol/week: 3.0 - 4.0 standard drinks of alcohol     Family History   Problem Relation Age of Onset    Colon Cancer Father     Diabetes Mother     Diabetes Brother          Current Outpatient Medications   Medication Sig Dispense Refill    lisinopril-hydrochlorothiazide (ZESTORETIC) 20-25 MG tablet Take 1 tablet by mouth daily. 90 tablet 3     No Known Allergies  Current providers sharing in care for this patient include:  Patient Care Team:  Terry Posadas MD as PCP - General (Family Medicine)  Terry Posadas MD as Assigned PCP    The following health maintenance items are reviewed in Epic and correct as of today:  Health Maintenance   Topic Date Due    LUNG CANCER SCREENING  03/24/2017    BMP  04/18/2025    MEDICARE ANNUAL WELLNESS VISIT  04/18/2025    ANNUAL REVIEW OF HM ORDERS  04/22/2026    FALL RISK ASSESSMENT  04/22/2026    DIABETES SCREENING  04/18/2027    LIPID  04/18/2029    COLORECTAL CANCER SCREENING  08/20/2029    ADVANCE CARE PLANNING  04/22/2030    DTAP/TDAP/TD IMMUNIZATION (4 - Td or Tdap) 05/03/2032    HEPATITIS C SCREENING  Completed    PHQ-2 (once per calendar year)  Completed    INFLUENZA VACCINE  Completed    Pneumococcal Vaccine: 50+ Years  Completed    ZOSTER IMMUNIZATION  Completed    RSV VACCINE  Completed    COVID-19 Vaccine  Completed    HPV IMMUNIZATION  Aged Out    MENINGITIS IMMUNIZATION  Aged Out         Review of Systems  Constitutional, HEENT, cardiovascular, pulmonary, GI, , musculoskeletal, neuro, skin, endocrine and psych systems are negative, except as otherwise noted.     Objective    Exam  /80   Pulse 89   Temp 98.5  F (36.9  C)   Resp 14   Ht 1.759 m (5' 9.25\")   Wt 102.5 kg (226 lb)   SpO2 95%   BMI 33.13 kg/m     Estimated body mass index is 33.13 kg/m  as calculated " "from the following:    Height as of this encounter: 1.759 m (5' 9.25\").    Weight as of this encounter: 102.5 kg (226 lb).    Physical Exam    GENERAL: alert and no distress  EYES: Eyes grossly normal to inspection, PERRL and conjunctivae and sclerae normal  HENT: ear canals and TM's normal, nose and mouth without ulcers or lesions  NECK: no adenopathy, no asymmetry, masses, or scars  RESP: lungs clear to auscultation - no rales, rhonchi or wheezes  CV: regular rate and rhythm, normal S1 S2, no S3 or S4, no murmur, click or rub, no peripheral edema  ABDOMEN: soft, nontender, no hepatosplenomegaly, no masses and bowel sounds normal   (male):  deferred  RECTAL:   deferred  MS: no gross musculoskeletal defects noted, no edema  SKIN: no suspicious lesions or rashes  NEURO: Normal strength and tone, mentation intact and speech normal  PSYCH: mentation appears normal, affect normal/bright        4/22/2025   Mini Cog   Clock Draw Score 2 Normal   3 Item Recall 3 objects recalled   Mini Cog Total Score 5              Signed Electronically by: Terry Posadas MD    "

## 2025-04-23 ENCOUNTER — PATIENT OUTREACH (OUTPATIENT)
Dept: CARE COORDINATION | Facility: CLINIC | Age: 70
End: 2025-04-23
Payer: COMMERCIAL

## 2025-04-23 LAB
ANION GAP SERPL CALCULATED.3IONS-SCNC: 9 MMOL/L (ref 7–15)
BUN SERPL-MCNC: 21.3 MG/DL (ref 8–23)
CALCIUM SERPL-MCNC: 10.2 MG/DL (ref 8.8–10.4)
CHLORIDE SERPL-SCNC: 107 MMOL/L (ref 98–107)
CHOLEST SERPL-MCNC: 156 MG/DL
CREAT SERPL-MCNC: 1.21 MG/DL (ref 0.67–1.17)
EGFRCR SERPLBLD CKD-EPI 2021: 64 ML/MIN/1.73M2
FASTING STATUS PATIENT QL REPORTED: YES
FASTING STATUS PATIENT QL REPORTED: YES
GLUCOSE SERPL-MCNC: 94 MG/DL (ref 70–99)
HCO3 SERPL-SCNC: 26 MMOL/L (ref 22–29)
HDLC SERPL-MCNC: 38 MG/DL
LDLC SERPL CALC-MCNC: 95 MG/DL
NONHDLC SERPL-MCNC: 118 MG/DL
POTASSIUM SERPL-SCNC: 4.4 MMOL/L (ref 3.4–5.3)
SODIUM SERPL-SCNC: 142 MMOL/L (ref 135–145)
TRIGL SERPL-MCNC: 113 MG/DL

## 2025-05-08 ENCOUNTER — PATIENT OUTREACH (OUTPATIENT)
Dept: GASTROENTEROLOGY | Facility: CLINIC | Age: 70
End: 2025-05-08
Payer: COMMERCIAL

## 2025-05-15 ENCOUNTER — VIRTUAL VISIT (OUTPATIENT)
Dept: FAMILY MEDICINE | Facility: CLINIC | Age: 70
End: 2025-05-15
Payer: COMMERCIAL

## 2025-05-15 DIAGNOSIS — Z09 FOLLOW-UP EXAM: Primary | ICD-10-CM

## 2025-05-15 DIAGNOSIS — S22.42XD: ICD-10-CM

## 2025-05-15 DIAGNOSIS — R91.8 OPACITY OF LUNG ON IMAGING STUDY: ICD-10-CM

## 2025-05-15 RX ORDER — CYCLOSPORINE 0.5 MG/ML
1 EMULSION OPHTHALMIC 2 TIMES DAILY
COMMUNITY
Start: 2025-03-03

## 2025-05-15 RX ORDER — AZITHROMYCIN 250 MG/1
TABLET, FILM COATED ORAL
Qty: 6 TABLET | Refills: 0 | Status: SHIPPED | OUTPATIENT
Start: 2025-05-15 | End: 2025-05-20

## 2025-05-15 NOTE — PROGRESS NOTES
Dov is a 70 year old who is being evaluated via a billable video visit.    How would you like to obtain your AVS? MyChart  If the video visit is dropped, the invitation should be resent by: Text to cell phone: [phone]  Will anyone else be joining your video visit? No      Assessment & Plan     Follow-up exam  Closed traumatic minimally displaced fracture of two ribs of left side with routine healing, subsequent encounter  Opacity of lung on imaging study  Evaluated 5/7/2025 at [facility] post fall.  He hit his left chest wall on the ladder when he fell.  Pain along left lateral ribs that worsen with deep breathing.  Did not hit head, no LOC.  Chest xray showed 2 minimally displaced posterior rib fractures of left 7th and 8th ribs.  Discharged with oxycodone 5 mg for pain management.  Transitioned to extra strength tylenol for the past 5-6 days.  Has been coughing up phlegm which is not abnormal for patient.  No difficulty breathing, SOB, fever, chills, chest pain, tachycardia.  Chest xray did show streaky opacities in the left lower lobe could represent subsegmental atelectasis, aspiration, or early pneumonia.  He will be traveling to [place] for vacation next week.  To be on the cautious side, will prescribe azithromycin (with watchful waiting) in case he develops symptoms on his trip concerning for pneumonia.  Reviewed S&S of pneumonia.  Continue with deep breathing.  Brace abdomen with increased intrathoracic pressure.  - azithromycin (ZITHROMAX) 250 MG tablet      MED REC REQUIRED  Post Medication Reconciliation Status:       Subjective   Dov is a 70 year old, presenting for the following health issues:  No chief complaint on file.        5/15/2025    11:17 AM   Additional Questions   Roomed by Alyssa NAYLOR CMA     Video Start Time: 11:53 AM    HPI    Patient is a 70-year old male presenting on video visit post ED.  Medical history includes HTN.    Evaluated 5/7/2025 at [facility] post fall.  PTA was  standing about 6 ft up on a ladder when the ladder feel and he feel ontop of the ladder.  He hit his left chest wall on the ladder when he fell.  Pain along left lateral ribs that worsen with deep breathing.  Did not hit head, no LOC.  No vomiting.  Not on blood thinners. Chest xray showed 2 minimally displaced posterior rib fractures of left 7th and 8th ribs.  Discharged with oxycodone 5 mg for pain management.     Took oxycodone few days after injury.  Evening of 5/11 reports left sided rib pain improved.  For the past 5-6 days has transitioned to extra strength tylenol which has been managing pain.  Has been sleeping in recliner.  Has been coughing up phlegm which is not abnormal for patient.  No difficulty breathing, SOB, fever, chills, chest pain, tachycardia.     Review of Systems  Review of systems negative otherwise known HPI.      Objective       Vitals:  No vitals were obtained today due to virtual visit.    Physical Exam   GENERAL: alert and no distress  EYES: Eyes grossly normal to inspection.  No discharge or erythema, or obvious scleral/conjunctival abnormalities.  RESP: No audible wheeze, cough, or visible cyanosis.    SKIN: Visible skin clear. No significant rash, abnormal pigmentation or lesions.  NEURO: Cranial nerves grossly intact.  Mentation and speech appropriate for age.  PSYCH: Appropriate affect, tone, and pace of words      Video-Visit Details    Type of service:  Video Visit   Video End Time:12:06 PM  Originating Location (pt. Location): Home    Distant Location (provider location):  On-site  Platform used for Video Visit: Maty  Signed Electronically by: NGHIA Markham CNP

## 2025-06-18 ENCOUNTER — DOCUMENTATION ONLY (OUTPATIENT)
Dept: OTHER | Facility: CLINIC | Age: 70
End: 2025-06-18
Payer: COMMERCIAL

## 2025-07-22 PROBLEM — Z86.0100 HISTORY OF COLONIC POLYPS: Status: ACTIVE | Noted: 2024-08-22

## 2025-07-22 PROBLEM — K63.5 POLYP OF COLON: Status: ACTIVE | Noted: 2019-08-08

## 2025-07-24 ENCOUNTER — OFFICE VISIT (OUTPATIENT)
Dept: FAMILY MEDICINE | Facility: CLINIC | Age: 70
End: 2025-07-24
Payer: COMMERCIAL

## 2025-07-24 VITALS
BODY MASS INDEX: 33.49 KG/M2 | WEIGHT: 226.1 LBS | OXYGEN SATURATION: 97 % | DIASTOLIC BLOOD PRESSURE: 76 MMHG | TEMPERATURE: 97.5 F | HEIGHT: 69 IN | RESPIRATION RATE: 20 BRPM | HEART RATE: 65 BPM | SYSTOLIC BLOOD PRESSURE: 124 MMHG

## 2025-07-24 DIAGNOSIS — B07.0 PLANTAR WART: Primary | ICD-10-CM

## 2025-07-24 DIAGNOSIS — I10 HYPERTENSION, UNSPECIFIED TYPE: ICD-10-CM

## 2025-07-24 ASSESSMENT — PAIN SCALES - GENERAL: PAINLEVEL_OUTOF10: NO PAIN (0)

## 2025-07-24 NOTE — PROGRESS NOTES
"  Assessment & Plan     Plantar wart  Cryotherapy performed on plantar wart of bottom of left foot.  The patient tolerated this well.  A bandage was placed and he is advised to follow-up in approximately 3 weeks for additional treatment if needed.  Unlikely signs and symptoms of infection to monitor for including redness, swelling, warmth, drainage or pain.    Hypertension, unspecified type  Blood pressure continues to be well controlled on lisinopril-hydrochlorothiazide.           Rose Monae is a 70 year old, presenting for the following health issues:  Wart (Wart on the bottom of left foot. )        7/24/2025    10:43 AM   Additional Questions   Roomed by Alyssa NAYLOR CMA     HPI      The patient presents today for evaluation of a plantar wart on the bottom of his left foot.  He states that he has had warts in the past on his right foot which he had removed via laser.  He has had some soreness in the left foot upon walking and weightbearing but nothing unbearable.  He has not been consistent with any over-the-counter wart treatment/removal medications.  He has had cryotherapy on other warts but not this current one.  He does not have any additional concerns at this time.      Review of Systems - pertinent positives noted in HPI, otherwise ROS is negative.        Objective    /76 (BP Location: Left arm, Patient Position: Sitting, Cuff Size: Adult Regular)   Pulse 65   Temp 97.5  F (36.4  C) (Temporal)   Resp 20   Ht 1.759 m (5' 9.25\")   Wt 102.6 kg (226 lb 1.6 oz)   SpO2 97%   BMI 33.15 kg/m    Body mass index is 33.15 kg/m .  Physical Exam   GENERAL: alert and no distress  SKIN: no suspicious lesions or rashes. Plantar wart noted on bottom of left foot. The is no redness or swelling. No tenderness upon palpation.           Signed Electronically by: NGHIA Rg CNP      "

## 2025-08-07 ENCOUNTER — OFFICE VISIT (OUTPATIENT)
Dept: FAMILY MEDICINE | Facility: CLINIC | Age: 70
End: 2025-08-07
Payer: COMMERCIAL

## 2025-08-07 VITALS
HEIGHT: 70 IN | HEART RATE: 87 BPM | RESPIRATION RATE: 20 BRPM | TEMPERATURE: 98.1 F | BODY MASS INDEX: 32.64 KG/M2 | WEIGHT: 228 LBS | OXYGEN SATURATION: 96 % | SYSTOLIC BLOOD PRESSURE: 122 MMHG | DIASTOLIC BLOOD PRESSURE: 70 MMHG

## 2025-08-07 DIAGNOSIS — B07.0 PLANTAR WART: Primary | ICD-10-CM

## 2025-08-07 ASSESSMENT — PAIN SCALES - GENERAL: PAINLEVEL_OUTOF10: NO PAIN (0)
